# Patient Record
Sex: FEMALE | Race: WHITE | NOT HISPANIC OR LATINO | Employment: OTHER | URBAN - METROPOLITAN AREA
[De-identification: names, ages, dates, MRNs, and addresses within clinical notes are randomized per-mention and may not be internally consistent; named-entity substitution may affect disease eponyms.]

---

## 2020-07-28 ENCOUNTER — RECORDS - HEALTHEAST (OUTPATIENT)
Dept: LAB | Facility: CLINIC | Age: 85
End: 2020-07-28

## 2020-07-29 LAB
ALBUMIN SERPL-MCNC: 3.6 G/DL (ref 3.5–5)
ALP SERPL-CCNC: 70 U/L (ref 45–120)
ALT SERPL W P-5'-P-CCNC: 23 U/L (ref 0–45)
ANION GAP SERPL CALCULATED.3IONS-SCNC: 14 MMOL/L (ref 5–18)
AST SERPL W P-5'-P-CCNC: 31 U/L (ref 0–40)
BILIRUB SERPL-MCNC: 0.4 MG/DL (ref 0–1)
BUN SERPL-MCNC: 21 MG/DL (ref 8–28)
CALCIUM SERPL-MCNC: 9.4 MG/DL (ref 8.5–10.5)
CHLORIDE BLD-SCNC: 104 MMOL/L (ref 98–107)
CO2 SERPL-SCNC: 23 MMOL/L (ref 22–31)
CREAT SERPL-MCNC: 0.81 MG/DL (ref 0.6–1.1)
ERYTHROCYTE [DISTWIDTH] IN BLOOD BY AUTOMATED COUNT: 14.1 % (ref 11–14.5)
GFR SERPL CREATININE-BSD FRML MDRD: >60 ML/MIN/1.73M2
GLUCOSE BLD-MCNC: 111 MG/DL (ref 70–125)
HBA1C MFR BLD: 7.2 %
HCT VFR BLD AUTO: 46.4 % (ref 35–47)
HGB BLD-MCNC: 14.2 G/DL (ref 12–16)
MCH RBC QN AUTO: 27.8 PG (ref 27–34)
MCHC RBC AUTO-ENTMCNC: 30.6 G/DL (ref 32–36)
MCV RBC AUTO: 91 FL (ref 80–100)
PLATELET # BLD AUTO: 268 THOU/UL (ref 140–440)
PMV BLD AUTO: 10.7 FL (ref 8.5–12.5)
POTASSIUM BLD-SCNC: 4.2 MMOL/L (ref 3.5–5)
PROT SERPL-MCNC: 7.3 G/DL (ref 6–8)
RBC # BLD AUTO: 5.1 MILL/UL (ref 3.8–5.4)
SODIUM SERPL-SCNC: 141 MMOL/L (ref 136–145)
TSH SERPL DL<=0.005 MIU/L-ACNC: 1.65 UIU/ML (ref 0.3–5)
VIT B12 SERPL-MCNC: 1039 PG/ML (ref 213–816)
WBC: 11.5 THOU/UL (ref 4–11)

## 2020-08-10 ENCOUNTER — RECORDS - HEALTHEAST (OUTPATIENT)
Dept: LAB | Facility: CLINIC | Age: 85
End: 2020-08-10

## 2020-08-10 LAB
ALBUMIN UR-MCNC: NEGATIVE MG/DL
AMPHETAMINES UR QL SCN: NORMAL
APPEARANCE UR: CLEAR
BACTERIA #/AREA URNS HPF: ABNORMAL HPF
BARBITURATES UR QL: NORMAL
BENZODIAZ UR QL: NORMAL
BILIRUB UR QL STRIP: NEGATIVE
CANNABINOIDS UR QL SCN: NORMAL
COCAINE UR QL: NORMAL
COLOR UR AUTO: COLORLESS
CREAT UR-MCNC: 13.4 MG/DL
GLUCOSE UR STRIP-MCNC: ABNORMAL MG/DL
HGB UR QL STRIP: NEGATIVE
KETONES UR STRIP-MCNC: NEGATIVE MG/DL
LEUKOCYTE ESTERASE UR QL STRIP: NEGATIVE
METHADONE UR QL SCN: NORMAL
NITRATE UR QL: NEGATIVE
OPIATES UR QL SCN: NORMAL
OXYCODONE UR QL: NORMAL
PCP UR QL SCN: NORMAL
PH UR STRIP: 5 [PH] (ref 4.5–8)
RBC #/AREA URNS AUTO: ABNORMAL HPF
SP GR UR STRIP: 1 (ref 1–1.03)
SQUAMOUS #/AREA URNS AUTO: ABNORMAL LPF
UROBILINOGEN UR STRIP-ACNC: ABNORMAL
WBC #/AREA URNS AUTO: ABNORMAL HPF

## 2020-08-11 ENCOUNTER — RECORDS - HEALTHEAST (OUTPATIENT)
Dept: LAB | Facility: CLINIC | Age: 85
End: 2020-08-11

## 2020-08-11 LAB — BACTERIA SPEC CULT: NORMAL

## 2020-08-12 LAB
ANION GAP SERPL CALCULATED.3IONS-SCNC: 12 MMOL/L (ref 5–18)
BUN SERPL-MCNC: 16 MG/DL (ref 8–28)
CALCIUM SERPL-MCNC: 9.1 MG/DL (ref 8.5–10.5)
CHLORIDE BLD-SCNC: 105 MMOL/L (ref 98–107)
CO2 SERPL-SCNC: 20 MMOL/L (ref 22–31)
CREAT SERPL-MCNC: 0.84 MG/DL (ref 0.6–1.1)
GFR SERPL CREATININE-BSD FRML MDRD: >60 ML/MIN/1.73M2
GLUCOSE BLD-MCNC: 98 MG/DL (ref 70–125)
POTASSIUM BLD-SCNC: 4.2 MMOL/L (ref 3.5–5)
SODIUM SERPL-SCNC: 137 MMOL/L (ref 136–145)

## 2020-08-24 ENCOUNTER — RECORDS - HEALTHEAST (OUTPATIENT)
Dept: LAB | Facility: CLINIC | Age: 85
End: 2020-08-24

## 2020-08-24 LAB
C DIFF TOX B STL QL: NEGATIVE
RIBOTYPE 027/NAP1/BI: NORMAL

## 2021-03-04 ENCOUNTER — APPOINTMENT (OUTPATIENT)
Dept: URBAN - METROPOLITAN AREA CLINIC 252 | Age: 86
Setting detail: DERMATOLOGY
End: 2021-03-04

## 2021-03-04 ENCOUNTER — RX ONLY (RX ONLY)
Age: 86
End: 2021-03-04

## 2021-03-04 VITALS — WEIGHT: 146 LBS | RESPIRATION RATE: 16 BRPM | HEIGHT: 60 IN

## 2021-03-04 DIAGNOSIS — L21.8 OTHER SEBORRHEIC DERMATITIS: ICD-10-CM

## 2021-03-04 DIAGNOSIS — L28.1 PRURIGO NODULARIS: ICD-10-CM

## 2021-03-04 DIAGNOSIS — L29.8 OTHER PRURITUS: ICD-10-CM

## 2021-03-04 PROCEDURE — 99203 OFFICE O/P NEW LOW 30 MIN: CPT | Mod: 25

## 2021-03-04 PROCEDURE — 11900 INJECT SKIN LESIONS </W 7: CPT

## 2021-03-04 PROCEDURE — OTHER INTRALESIONAL KENALOG: OTHER

## 2021-03-04 PROCEDURE — OTHER COUNSELING: OTHER

## 2021-03-04 PROCEDURE — OTHER PRESCRIPTION: OTHER

## 2021-03-04 PROCEDURE — OTHER ADDITIONAL NOTES: OTHER

## 2021-03-04 RX ORDER — CLOBETASOL PROPIONATE 0.5 MG/G
.05% CREAM TOPICAL BID
Qty: 1 | Refills: 1 | Status: CANCELLED | COMMUNITY
Start: 2021-03-04

## 2021-03-04 RX ORDER — KETOCONAZOLE 20 MG/ML
2% SHAMPOO, SUSPENSION TOPICAL
Qty: 1 | Refills: 3 | Status: ERX | COMMUNITY
Start: 2021-03-04

## 2021-03-04 RX ORDER — CLOBETASOL PROPIONATE 0.5 MG/G
.05% CREAM TOPICAL TWICE A DAY
Qty: 1 | Refills: 0 | Status: ERX

## 2021-03-04 ASSESSMENT — LOCATION SIMPLE DESCRIPTION DERM
LOCATION SIMPLE: LEFT UPPER BACK
LOCATION SIMPLE: LEFT SHOULDER
LOCATION SIMPLE: RIGHT FOREARM
LOCATION SIMPLE: SCALP
LOCATION SIMPLE: RIGHT PRETIBIAL REGION
LOCATION SIMPLE: LOWER BACK
LOCATION SIMPLE: LEFT FOREARM
LOCATION SIMPLE: UPPER BACK
LOCATION SIMPLE: LEFT THIGH
LOCATION SIMPLE: RIGHT THIGH
LOCATION SIMPLE: RIGHT UPPER BACK
LOCATION SIMPLE: POSTERIOR NECK

## 2021-03-04 ASSESSMENT — LOCATION DETAILED DESCRIPTION DERM
LOCATION DETAILED: LEFT SUPERIOR UPPER BACK
LOCATION DETAILED: LEFT PROXIMAL DORSAL FOREARM
LOCATION DETAILED: RIGHT DISTAL PRETIBIAL REGION
LOCATION DETAILED: LEFT POSTERIOR SHOULDER
LOCATION DETAILED: RIGHT ANTERIOR PROXIMAL THIGH
LOCATION DETAILED: RIGHT PROXIMAL DORSAL FOREARM
LOCATION DETAILED: LEFT ANTERIOR PROXIMAL THIGH
LOCATION DETAILED: RIGHT SUPERIOR UPPER BACK
LOCATION DETAILED: LEFT SUPERIOR PARIETAL SCALP
LOCATION DETAILED: SUPERIOR THORACIC SPINE
LOCATION DETAILED: RIGHT MEDIAL TRAPEZIAL NECK
LOCATION DETAILED: SUPERIOR LUMBAR SPINE
LOCATION DETAILED: LEFT MEDIAL TRAPEZIAL NECK

## 2021-03-04 ASSESSMENT — LOCATION ZONE DERM
LOCATION ZONE: NECK
LOCATION ZONE: LEG
LOCATION ZONE: TRUNK
LOCATION ZONE: SCALP
LOCATION ZONE: ARM

## 2021-03-04 NOTE — PROCEDURE: ADDITIONAL NOTES
Additional Notes: [facility] fax - [phone]\\nShower twice a week, wash hair once a week with ketoconazole on back of scalp and selsun blue on rest of scalp. ****Use loofah and apply Eucerin anti itch lotion after. Additional Notes: [facility] fax - [phone]\\nShower twice a week, wash hair once a week with ketoconazole on back of scalp and selsun blue on rest of scalp. ****Use loofah and apply Eucerin anti itch lotion after.

## 2021-03-09 ENCOUNTER — RECORDS - HEALTHEAST (OUTPATIENT)
Dept: LAB | Facility: CLINIC | Age: 86
End: 2021-03-09

## 2021-03-10 LAB
ANION GAP SERPL CALCULATED.3IONS-SCNC: 9 MMOL/L (ref 5–18)
BUN SERPL-MCNC: 24 MG/DL (ref 8–28)
CALCIUM SERPL-MCNC: 9.7 MG/DL (ref 8.5–10.5)
CHLORIDE BLD-SCNC: 103 MMOL/L (ref 98–107)
CO2 SERPL-SCNC: 27 MMOL/L (ref 22–31)
CREAT SERPL-MCNC: 0.91 MG/DL (ref 0.6–1.1)
ERYTHROCYTE [DISTWIDTH] IN BLOOD BY AUTOMATED COUNT: 14.1 % (ref 11–14.5)
GFR SERPL CREATININE-BSD FRML MDRD: 58 ML/MIN/1.73M2
GLUCOSE BLD-MCNC: 193 MG/DL (ref 70–125)
HBA1C MFR BLD: 7.8 %
HCT VFR BLD AUTO: 44.3 % (ref 35–47)
HGB BLD-MCNC: 13.8 G/DL (ref 12–16)
MCH RBC QN AUTO: 28.4 PG (ref 27–34)
MCHC RBC AUTO-ENTMCNC: 31.2 G/DL (ref 32–36)
MCV RBC AUTO: 91 FL (ref 80–100)
PLATELET # BLD AUTO: 102 THOU/UL (ref 140–440)
PMV BLD AUTO: 11.9 FL (ref 8.5–12.5)
POTASSIUM BLD-SCNC: 4.7 MMOL/L (ref 3.5–5)
RBC # BLD AUTO: 4.86 MILL/UL (ref 3.8–5.4)
SODIUM SERPL-SCNC: 139 MMOL/L (ref 136–145)
TSH SERPL DL<=0.005 MIU/L-ACNC: 1.73 UIU/ML (ref 0.3–5)
WBC: 8.7 THOU/UL (ref 4–11)

## 2021-03-20 ENCOUNTER — RECORDS - HEALTHEAST (OUTPATIENT)
Dept: LAB | Facility: CLINIC | Age: 86
End: 2021-03-20

## 2021-03-20 LAB
SARS-COV-2 PCR COMMENT: NORMAL
SARS-COV-2 RNA SPEC QL NAA+PROBE: NEGATIVE
SARS-COV-2 VIRUS SPECIMEN SOURCE: NORMAL

## 2021-04-07 ENCOUNTER — APPOINTMENT (OUTPATIENT)
Dept: URBAN - METROPOLITAN AREA CLINIC 252 | Age: 86
Setting detail: DERMATOLOGY
End: 2021-04-11

## 2021-04-07 VITALS — WEIGHT: 144 LBS | HEIGHT: 60 IN | RESPIRATION RATE: 16 BRPM

## 2021-04-07 DIAGNOSIS — L28.1 PRURIGO NODULARIS: ICD-10-CM

## 2021-04-07 DIAGNOSIS — L29.8 OTHER PRURITUS: ICD-10-CM

## 2021-04-07 DIAGNOSIS — L08.9 LOCAL INFECTION OF THE SKIN AND SUBCUTANEOUS TISSUE, UNSPECIFIED: ICD-10-CM

## 2021-04-07 PROCEDURE — 99213 OFFICE O/P EST LOW 20 MIN: CPT

## 2021-04-07 PROCEDURE — OTHER ADDITIONAL NOTES: OTHER

## 2021-04-07 PROCEDURE — OTHER PRESCRIPTION: OTHER

## 2021-04-07 PROCEDURE — OTHER COUNSELING: OTHER

## 2021-04-07 RX ORDER — CEPHALEXIN 500 MG/1
500MG TABLET ORAL BID
Qty: 20 | Refills: 0 | Status: ERX | COMMUNITY
Start: 2021-04-07

## 2021-04-07 RX ORDER — MUPIROCIN 20 MG/G
2% OINTMENT TOPICAL BID
Qty: 2 | Refills: 1 | Status: ERX | COMMUNITY
Start: 2021-04-07

## 2021-04-07 RX ORDER — PETROLATUM,WHITE 41 %
0.1% OINTMENT (GRAM) TOPICAL QD
Qty: 1 | Refills: 5 | Status: ERX | COMMUNITY
Start: 2021-04-07

## 2021-04-07 ASSESSMENT — LOCATION DETAILED DESCRIPTION DERM
LOCATION DETAILED: LEFT ANTERIOR PROXIMAL THIGH
LOCATION DETAILED: LEFT PROXIMAL DORSAL FOREARM
LOCATION DETAILED: SUPERIOR LUMBAR SPINE
LOCATION DETAILED: RIGHT PROXIMAL DORSAL FOREARM
LOCATION DETAILED: RIGHT PROXIMAL PRETIBIAL REGION
LOCATION DETAILED: LEFT MEDIAL TRAPEZIAL NECK
LOCATION DETAILED: RIGHT POSTERIOR SHOULDER
LOCATION DETAILED: RIGHT MEDIAL TRAPEZIAL NECK
LOCATION DETAILED: RIGHT ANTERIOR PROXIMAL THIGH

## 2021-04-07 ASSESSMENT — LOCATION SIMPLE DESCRIPTION DERM
LOCATION SIMPLE: LEFT THIGH
LOCATION SIMPLE: RIGHT THIGH
LOCATION SIMPLE: RIGHT FOREARM
LOCATION SIMPLE: RIGHT PRETIBIAL REGION
LOCATION SIMPLE: POSTERIOR NECK
LOCATION SIMPLE: LOWER BACK
LOCATION SIMPLE: LEFT FOREARM
LOCATION SIMPLE: RIGHT SHOULDER

## 2021-04-07 ASSESSMENT — LOCATION ZONE DERM
LOCATION ZONE: ARM
LOCATION ZONE: LEG
LOCATION ZONE: NECK
LOCATION ZONE: TRUNK

## 2021-04-07 NOTE — PROCEDURE: ADDITIONAL NOTES
Additional Notes: [facility] fax - [phone]\\nShower twice a week, wash hair once a week with ketoconazole on back of scalp and selsun blue on rest of scalp. ****apply Eucerin anti itch lotion Additional Notes: [facility] fax - [phone]\\nShower twice a week, wash hair once a week with ketoconazole on back of scalp and selsun blue on rest of scalp. ****apply Eucerin anti itch lotion

## 2021-04-19 ENCOUNTER — RECORDS - HEALTHEAST (OUTPATIENT)
Dept: LAB | Facility: CLINIC | Age: 86
End: 2021-04-19

## 2021-04-20 LAB — BACTERIA SPEC CULT: NO GROWTH

## 2021-07-07 ENCOUNTER — APPOINTMENT (OUTPATIENT)
Dept: URBAN - METROPOLITAN AREA CLINIC 252 | Age: 86
Setting detail: DERMATOLOGY
End: 2021-07-11

## 2021-07-07 ENCOUNTER — RX ONLY (RX ONLY)
Age: 86
End: 2021-07-07

## 2021-07-07 VITALS — HEIGHT: 60 IN | WEIGHT: 144 LBS | RESPIRATION RATE: 18 BRPM

## 2021-07-07 DIAGNOSIS — L85.3 XEROSIS CUTIS: ICD-10-CM

## 2021-07-07 DIAGNOSIS — L97 NON-PRESSURE CHRONIC ULCER OF LOWER LIMB, NOT ELSEWHERE CLASSIFIED: ICD-10-CM

## 2021-07-07 DIAGNOSIS — I87.2 VENOUS INSUFFICIENCY (CHRONIC) (PERIPHERAL): ICD-10-CM

## 2021-07-07 PROBLEM — L97.819 NON-PRESSURE CHRONIC ULCER OF OTHER PART OF RIGHT LOWER LEG WITH UNSPECIFIED SEVERITY: Status: ACTIVE | Noted: 2021-07-07

## 2021-07-07 PROCEDURE — 99213 OFFICE O/P EST LOW 20 MIN: CPT

## 2021-07-07 PROCEDURE — OTHER ADDITIONAL NOTES: OTHER

## 2021-07-07 PROCEDURE — OTHER PRESCRIPTION MEDICATION MANAGEMENT: OTHER

## 2021-07-07 PROCEDURE — OTHER COUNSELING: OTHER

## 2021-07-07 PROCEDURE — OTHER PRESCRIPTION: OTHER

## 2021-07-07 RX ORDER — TRIAMCINOLONE ACETONIDE 1 MG/G
0.1% CREAM TOPICAL BID
Qty: 1 | Refills: 1 | Status: ERX

## 2021-07-07 RX ORDER — TRIAMCINOLONE ACETONIDE 1 MG/G
0.1% CREAM TOPICAL BID
Qty: 1 | Refills: 1 | Status: CANCELLED | COMMUNITY
Start: 2021-07-07

## 2021-07-07 RX ORDER — MUPIROCIN 20 MG/G
2% OINTMENT TOPICAL BID
Qty: 1 | Refills: 1 | Status: CANCELLED | COMMUNITY
Start: 2021-07-07

## 2021-07-07 RX ORDER — MUPIROCIN 20 MG/G
2% OINTMENT TOPICAL BID
Qty: 1 | Refills: 1 | Status: ERX

## 2021-07-07 ASSESSMENT — LOCATION DETAILED DESCRIPTION DERM
LOCATION DETAILED: RIGHT DISTAL PRETIBIAL REGION
LOCATION DETAILED: LEFT DISTAL PRETIBIAL REGION
LOCATION DETAILED: RIGHT PROXIMAL PRETIBIAL REGION

## 2021-07-07 ASSESSMENT — LOCATION SIMPLE DESCRIPTION DERM
LOCATION SIMPLE: RIGHT PRETIBIAL REGION
LOCATION SIMPLE: LEFT PRETIBIAL REGION

## 2021-07-07 ASSESSMENT — LOCATION ZONE DERM: LOCATION ZONE: LEG

## 2021-07-07 NOTE — HPI: RASH
What Type Of Note Output Would You Prefer (Optional)?: Bullet Format
Is The Patient Presenting As Previously Scheduled?: No, they are coming in before their scheduled appointment
How Severe Is Your Rash?: moderate
Is This A New Presentation, Or A Follow-Up?: Rash
Additional History: Accompanied by daughter with hx of cancer,patient is moving to new residence\\nPatients daughter has been cleaning area and applying emollients

## 2021-07-07 NOTE — PROCEDURE: COUNSELING
Patient Specific Counseling (Will Not Stick From Patient To Patient): Initiate mupirocin 2% BID x 7 days and cover with bandage, then D/C. After the mupirocin course, initiate aquaphor (or Vaseline if available) QD and cover with bandage until wounds are healed.
Detail Level: Detailed
Detail Level: Zone

## 2021-07-07 NOTE — PROCEDURE: PRESCRIPTION MEDICATION MANAGEMENT
Detail Level: Zone
Render In Strict Bullet Format?: No
Initiate Treatment: Triamcinolone 0.1% twice daily x 2 weeks, then D/C and implement PRN BID for flares of itching \\n\\nEucerin cream or OTC moisturizer of choice to the trunk and dry, itchy skin QD scheduled and PRN BID

## 2021-07-07 NOTE — PROCEDURE: ADDITIONAL NOTES
Additional Notes: **** Gave 4 printed patient hand outs for patient to give to her Assisted Living facility to implement orders as well as copies for patient daughters and Son per request.  \\n\\n***Per daughters request, She called after the appt and WANTS THE RXs TO GO TO [facility] instead of the local [facility] Additional Notes: **** Gave 4 printed patient hand outs for patient to give to her Assisted Living facility to implement orders as well as copies for patient daughters and Son per request.  \\n\\n***Per daughters request, She called after the appt and WANTS THE RXs TO GO TO [facility] instead of the local [facility]

## 2021-08-14 ENCOUNTER — LAB REQUISITION (OUTPATIENT)
Dept: LAB | Facility: CLINIC | Age: 86
End: 2021-08-14
Payer: COMMERCIAL

## 2021-08-14 DIAGNOSIS — E55.9 VITAMIN D DEFICIENCY, UNSPECIFIED: ICD-10-CM

## 2021-08-14 DIAGNOSIS — E11.42 TYPE 2 DIABETES MELLITUS WITH DIABETIC POLYNEUROPATHY (H): ICD-10-CM

## 2021-08-16 LAB
ALBUMIN SERPL-MCNC: 3.4 G/DL (ref 3.5–5)
ALP SERPL-CCNC: 51 U/L (ref 45–120)
ALT SERPL W P-5'-P-CCNC: 20 U/L (ref 0–45)
ANION GAP SERPL CALCULATED.3IONS-SCNC: 11 MMOL/L (ref 5–18)
AST SERPL W P-5'-P-CCNC: 27 U/L (ref 0–40)
BASOPHILS # BLD AUTO: 0.1 10E3/UL (ref 0–0.2)
BASOPHILS NFR BLD AUTO: 1 %
BILIRUB SERPL-MCNC: 0.3 MG/DL (ref 0–1)
BUN SERPL-MCNC: 23 MG/DL (ref 8–28)
CALCIUM SERPL-MCNC: 9.5 MG/DL (ref 8.5–10.5)
CHLORIDE BLD-SCNC: 106 MMOL/L (ref 98–107)
CO2 SERPL-SCNC: 25 MMOL/L (ref 22–31)
CREAT SERPL-MCNC: 0.82 MG/DL (ref 0.6–1.1)
DEPRECATED CALCIDIOL+CALCIFEROL SERPL-MC: 51 UG/L (ref 30–80)
EOSINOPHIL # BLD AUTO: 0.2 10E3/UL (ref 0–0.7)
EOSINOPHIL NFR BLD AUTO: 3 %
ERYTHROCYTE [DISTWIDTH] IN BLOOD BY AUTOMATED COUNT: 14.9 % (ref 10–15)
GFR SERPL CREATININE-BSD FRML MDRD: 63 ML/MIN/1.73M2
GLUCOSE BLD-MCNC: 75 MG/DL (ref 70–125)
HBA1C MFR BLD: 7.3 %
HCT VFR BLD AUTO: 43.1 % (ref 35–47)
HGB BLD-MCNC: 13.3 G/DL (ref 11.7–15.7)
IMM GRANULOCYTES # BLD: 0 10E3/UL
IMM GRANULOCYTES NFR BLD: 0 %
LYMPHOCYTES # BLD AUTO: 1.6 10E3/UL (ref 0.8–5.3)
LYMPHOCYTES NFR BLD AUTO: 28 %
MCH RBC QN AUTO: 27.1 PG (ref 26.5–33)
MCHC RBC AUTO-ENTMCNC: 30.9 G/DL (ref 31.5–36.5)
MCV RBC AUTO: 88 FL (ref 78–100)
MONOCYTES # BLD AUTO: 0.5 10E3/UL (ref 0–1.3)
MONOCYTES NFR BLD AUTO: 9 %
NEUTROPHILS # BLD AUTO: 3.4 10E3/UL (ref 1.6–8.3)
NEUTROPHILS NFR BLD AUTO: 59 %
NRBC # BLD AUTO: 0 10E3/UL
NRBC BLD AUTO-RTO: 0 /100
PLATELET # BLD AUTO: 134 10E3/UL (ref 150–450)
POTASSIUM BLD-SCNC: 4.3 MMOL/L (ref 3.5–5)
PROT SERPL-MCNC: 6.5 G/DL (ref 6–8)
RBC # BLD AUTO: 4.91 10E6/UL (ref 3.8–5.2)
SODIUM SERPL-SCNC: 142 MMOL/L (ref 136–145)
WBC # BLD AUTO: 5.7 10E3/UL (ref 4–11)

## 2021-08-16 PROCEDURE — 85025 COMPLETE CBC W/AUTO DIFF WBC: CPT | Mod: ORL | Performed by: FAMILY MEDICINE

## 2021-08-16 PROCEDURE — 80053 COMPREHEN METABOLIC PANEL: CPT | Mod: ORL | Performed by: FAMILY MEDICINE

## 2021-08-16 PROCEDURE — 83036 HEMOGLOBIN GLYCOSYLATED A1C: CPT | Mod: ORL | Performed by: FAMILY MEDICINE

## 2021-08-16 PROCEDURE — 82306 VITAMIN D 25 HYDROXY: CPT | Mod: ORL | Performed by: FAMILY MEDICINE

## 2021-08-16 PROCEDURE — P9603 ONE-WAY ALLOW PRORATED MILES: HCPCS | Mod: ORL | Performed by: FAMILY MEDICINE

## 2021-08-16 PROCEDURE — 36415 COLL VENOUS BLD VENIPUNCTURE: CPT | Mod: ORL | Performed by: FAMILY MEDICINE

## 2021-09-08 ENCOUNTER — LAB REQUISITION (OUTPATIENT)
Dept: LAB | Facility: CLINIC | Age: 86
End: 2021-09-08
Payer: COMMERCIAL

## 2021-09-08 DIAGNOSIS — Z03.818 ENCOUNTER FOR OBSERVATION FOR SUSPECTED EXPOSURE TO OTHER BIOLOGICAL AGENTS RULED OUT: ICD-10-CM

## 2021-09-09 PROCEDURE — U0005 INFEC AGEN DETEC AMPLI PROBE: HCPCS | Mod: ORL | Performed by: FAMILY MEDICINE

## 2021-09-10 ENCOUNTER — TELEPHONE (OUTPATIENT)
Dept: WOUND CARE | Facility: CLINIC | Age: 86
End: 2021-09-10

## 2021-09-10 ENCOUNTER — OFFICE VISIT (OUTPATIENT)
Dept: FAMILY MEDICINE | Facility: CLINIC | Age: 86
End: 2021-09-10

## 2021-09-10 VITALS
HEIGHT: 59 IN | BODY MASS INDEX: 29.19 KG/M2 | WEIGHT: 144.8 LBS | SYSTOLIC BLOOD PRESSURE: 124 MMHG | TEMPERATURE: 97.3 F | OXYGEN SATURATION: 94 % | DIASTOLIC BLOOD PRESSURE: 74 MMHG | HEART RATE: 78 BPM

## 2021-09-10 DIAGNOSIS — M25.571 PAIN IN JOINT INVOLVING ANKLE AND FOOT, RIGHT: ICD-10-CM

## 2021-09-10 DIAGNOSIS — M70.31 BURSITIS OF OTHER BURSA OF RIGHT ELBOW: ICD-10-CM

## 2021-09-10 DIAGNOSIS — M25.521 RIGHT ELBOW PAIN: ICD-10-CM

## 2021-09-10 DIAGNOSIS — L97.912 ULCER OF RIGHT LOWER EXTREMITY WITH FAT LAYER EXPOSED (H): Primary | ICD-10-CM

## 2021-09-10 DIAGNOSIS — L97.812 NON-PRESSURE CHRONIC ULCER OF OTHER PART OF RIGHT LOWER LEG WITH FAT LAYER EXPOSED (H): ICD-10-CM

## 2021-09-10 DIAGNOSIS — I83.213 VARICOSE VEINS OF RIGHT LOWER EXTREMITY WITH BOTH ULCER OF ANKLE AND INFLAMMATION (CODE) (H): ICD-10-CM

## 2021-09-10 DIAGNOSIS — S81.801D OPEN WOUND OF RIGHT LOWER EXTREMITY, SUBSEQUENT ENCOUNTER: Primary | ICD-10-CM

## 2021-09-10 PROBLEM — E78.5 HYPERLIPIDEMIA: Status: ACTIVE | Noted: 2021-09-10

## 2021-09-10 PROBLEM — H90.3 SENSORINEURAL HEARING LOSS (SNHL), BILATERAL: Status: ACTIVE | Noted: 2017-08-01

## 2021-09-10 PROBLEM — E53.1 VITAMIN B6 DEFICIENCY: Status: ACTIVE | Noted: 2020-10-27

## 2021-09-10 PROBLEM — E11.42 DIABETIC PERIPHERAL NEUROPATHY (H): Status: ACTIVE | Noted: 2019-03-13

## 2021-09-10 PROBLEM — Z76.89 HEALTH CARE HOME: Status: ACTIVE | Noted: 2021-09-10

## 2021-09-10 PROBLEM — D50.9 IRON DEFICIENCY ANEMIA: Status: ACTIVE | Noted: 2018-08-27

## 2021-09-10 PROBLEM — I73.9 PVD (PERIPHERAL VASCULAR DISEASE) (H): Status: ACTIVE | Noted: 2019-03-13

## 2021-09-10 PROBLEM — G47.33 OBSTRUCTIVE SLEEP APNEA (ADULT) (PEDIATRIC): Status: ACTIVE | Noted: 2019-04-25

## 2021-09-10 PROBLEM — E55.9 VITAMIN D DEFICIENCY: Status: ACTIVE | Noted: 2018-07-03

## 2021-09-10 PROBLEM — M19.90 OSTEOARTHRITIS: Status: ACTIVE | Noted: 2021-09-10

## 2021-09-10 PROBLEM — M81.0 AGE-RELATED OSTEOPOROSIS WITHOUT CURRENT PATHOLOGICAL FRACTURE: Status: ACTIVE | Noted: 2020-02-12

## 2021-09-10 PROBLEM — R91.1 LUNG NODULE: Status: ACTIVE | Noted: 2019-03-07

## 2021-09-10 PROBLEM — J98.4 OTHER DISEASES OF LUNG, NOT ELSEWHERE CLASSIFIED: Status: ACTIVE | Noted: 2021-09-10

## 2021-09-10 LAB — SARS-COV-2 RNA RESP QL NAA+PROBE: NEGATIVE

## 2021-09-10 PROCEDURE — 99204 OFFICE O/P NEW MOD 45 MIN: CPT | Performed by: FAMILY MEDICINE

## 2021-09-10 PROCEDURE — 73610 X-RAY EXAM OF ANKLE: CPT | Mod: RT | Performed by: FAMILY MEDICINE

## 2021-09-10 PROCEDURE — 73070 X-RAY EXAM OF ELBOW: CPT | Mod: RT | Performed by: FAMILY MEDICINE

## 2021-09-10 RX ORDER — DAPAGLIFLOZIN AND SAXAGLIPTIN 10; 5 MG/1; MG/1
1 TABLET, FILM COATED ORAL
COMMUNITY
Start: 2021-04-12 | End: 2021-09-10

## 2021-09-10 RX ORDER — MULTIVITAMIN WITH IRON
TABLET ORAL
COMMUNITY
Start: 2021-07-28 | End: 2022-07-24

## 2021-09-10 RX ORDER — CALCIUM CARBONATE 500(1250)
TABLET,CHEWABLE ORAL
COMMUNITY
Start: 2020-02-13 | End: 2021-09-10

## 2021-09-10 RX ORDER — BISMUTH SUBSALICYLATE 525 MG/15ML
SUSPENSION ORAL
COMMUNITY
Start: 2021-07-24 | End: 2021-09-10

## 2021-09-10 RX ORDER — METFORMIN HCL 500 MG
TABLET, EXTENDED RELEASE 24 HR ORAL
COMMUNITY
Start: 2021-08-17 | End: 2021-09-10

## 2021-09-10 RX ORDER — PSYLLIUM HUSK (WITH SUGAR) 3.4 G/12 G
POWDER (GRAM) ORAL
COMMUNITY
Start: 2021-05-03 | End: 2021-09-10

## 2021-09-10 RX ORDER — CLINDAMYCIN HCL 150 MG
CAPSULE ORAL
COMMUNITY
End: 2021-09-10

## 2021-09-10 RX ORDER — CARBOXYMETHYLCELLULOSE SODIUM 5 MG/ML
SOLUTION/ DROPS OPHTHALMIC
COMMUNITY
Start: 2021-07-28 | End: 2021-09-10

## 2021-09-10 RX ORDER — ERYTHROMYCIN 5 MG/G
OINTMENT OPHTHALMIC
COMMUNITY
Start: 2021-04-13 | End: 2021-09-10

## 2021-09-10 RX ORDER — CLOBETASOL PROPIONATE 0.5 MG/G
CREAM TOPICAL
COMMUNITY
Start: 2021-03-04 | End: 2021-09-10

## 2021-09-10 RX ORDER — PEN NEEDLE, DIABETIC, SAFETY 30 GX3/16"
NEEDLE, DISPOSABLE MISCELLANEOUS
COMMUNITY
Start: 2020-05-07

## 2021-09-10 RX ORDER — LEVOFLOXACIN 500 MG/1
TABLET, FILM COATED ORAL
COMMUNITY
End: 2021-09-10

## 2021-09-10 RX ORDER — INSULIN ASPART 100 [IU]/ML
INJECTION, SOLUTION INTRAVENOUS; SUBCUTANEOUS
COMMUNITY
End: 2021-09-10

## 2021-09-10 RX ORDER — CHOLESTYRAMINE 4 G/9G
1 POWDER, FOR SUSPENSION ORAL 3 TIMES DAILY PRN
COMMUNITY
Start: 2021-07-27 | End: 2023-01-01

## 2021-09-10 RX ORDER — CALCIUM CARBONATE 500 MG/1
2 TABLET, CHEWABLE ORAL 2 TIMES DAILY PRN
COMMUNITY
Start: 2021-07-27 | End: 2023-01-01

## 2021-09-10 RX ORDER — ACETAMINOPHEN 500 MG
500 TABLET ORAL 4 TIMES DAILY PRN
COMMUNITY
Start: 2021-09-10

## 2021-09-10 RX ORDER — BETAMETHASONE DIPROPIONATE 0.5 MG/G
OINTMENT, AUGMENTED TOPICAL
COMMUNITY
End: 2021-09-10

## 2021-09-10 RX ORDER — ALPRAZOLAM 1 MG/1
TABLET ORAL
COMMUNITY
Start: 2021-09-03 | End: 2021-09-10

## 2021-09-10 RX ORDER — FLASH GLUCOSE SENSOR
KIT MISCELLANEOUS
COMMUNITY
Start: 2021-05-12 | End: 2021-09-10

## 2021-09-10 RX ORDER — HYDROCODONE BITARTRATE AND ACETAMINOPHEN 5; 325 MG/1; MG/1
TABLET ORAL
COMMUNITY
Start: 2021-03-24 | End: 2021-09-10

## 2021-09-10 RX ORDER — CARBOXYMETHYLCELLULOSE SODIUM 5 MG/ML
1 SOLUTION/ DROPS OPHTHALMIC EVERY 4 HOURS PRN
COMMUNITY
Start: 2021-09-10

## 2021-09-10 RX ORDER — MULTIVIT-MIN/IRON/FOLIC ACID/K 18-600-40
2000 CAPSULE ORAL DAILY
COMMUNITY
Start: 2021-09-10 | End: 2023-01-01

## 2021-09-10 RX ORDER — CARBOXYMETHYLCELLULOSE SODIUM 5 MG/ML
SOLUTION/ DROPS OPHTHALMIC
COMMUNITY
Start: 2021-06-26 | End: 2021-09-10

## 2021-09-10 RX ORDER — DAPAGLIFLOZIN AND SAXAGLIPTIN 10; 5 MG/1; MG/1
TABLET, FILM COATED ORAL
COMMUNITY
Start: 2021-09-10 | End: 2022-07-24

## 2021-09-10 RX ORDER — LANOLIN ALCOHOL/MO/W.PET/CERES
CREAM (GRAM) TOPICAL DAILY
COMMUNITY
Start: 2021-09-10 | End: 2022-07-24

## 2021-09-10 RX ORDER — FLUOCINONIDE TOPICAL SOLUTION USP, 0.05% 0.5 MG/ML
SOLUTION TOPICAL
COMMUNITY
End: 2021-09-10

## 2021-09-10 RX ORDER — BETAMETHASONE DIPROPIONATE 0.5 MG/ML
LOTION, AUGMENTED TOPICAL
COMMUNITY
End: 2021-09-10

## 2021-09-10 RX ORDER — INSULIN GLARGINE 100 [IU]/ML
12 INJECTION, SOLUTION SUBCUTANEOUS AT BEDTIME
Status: ON HOLD | COMMUNITY
Start: 2021-09-10 | End: 2022-07-28

## 2021-09-10 RX ORDER — GUAIFENESIN 600 MG/1
600 TABLET, EXTENDED RELEASE ORAL 2 TIMES DAILY PRN
COMMUNITY
Start: 2021-07-27 | End: 2023-01-01

## 2021-09-10 RX ORDER — IPRATROPIUM BROMIDE AND ALBUTEROL SULFATE 2.5; .5 MG/3ML; MG/3ML
SOLUTION RESPIRATORY (INHALATION)
COMMUNITY
Start: 2020-07-10 | End: 2022-07-24

## 2021-09-10 RX ORDER — HYDROCORTISONE AND ACETIC ACID 20.75; 10.375 MG/ML; MG/ML
SOLUTION AURICULAR (OTIC)
COMMUNITY
Start: 2021-01-05 | End: 2021-09-10

## 2021-09-10 RX ORDER — UREA 10 %
500 LOTION (ML) TOPICAL 2 TIMES DAILY
COMMUNITY
Start: 2021-09-10 | End: 2022-07-24

## 2021-09-10 RX ORDER — SODIUM CHLORIDE 0.65 %
2 AEROSOL, SPRAY (ML) NASAL
COMMUNITY
Start: 2021-07-27

## 2021-09-10 RX ORDER — BLOOD GLUCOSE CONTROL HIGH,LOW
2 EACH MISCELLANEOUS
COMMUNITY
Start: 2020-04-07 | End: 2021-09-10

## 2021-09-10 RX ORDER — METFORMIN HCL 500 MG
500 TABLET, EXTENDED RELEASE 24 HR ORAL DAILY
COMMUNITY
Start: 2021-09-10 | End: 2023-01-01

## 2021-09-10 RX ORDER — GABAPENTIN 300 MG/1
300 CAPSULE ORAL AT BEDTIME
COMMUNITY
Start: 2021-08-24 | End: 2023-01-01

## 2021-09-10 RX ORDER — KETOCONAZOLE 20 MG/ML
1 SHAMPOO TOPICAL
COMMUNITY
End: 2023-01-01

## 2021-09-10 RX ORDER — TRIAMCINOLONE ACETONIDE 1 MG/G
CREAM TOPICAL
COMMUNITY
Start: 2021-07-08 | End: 2021-09-10

## 2021-09-10 RX ORDER — FUROSEMIDE 20 MG
20 TABLET ORAL
COMMUNITY
End: 2023-01-01

## 2021-09-10 RX ORDER — GLIMEPIRIDE 4 MG/1
4 TABLET ORAL DAILY
COMMUNITY
Start: 2021-09-10 | End: 2022-07-24

## 2021-09-10 RX ORDER — MICONAZOLE NITRATE 20 MG/G
CREAM TOPICAL
COMMUNITY
Start: 2021-07-27

## 2021-09-10 RX ORDER — INSULIN DETEMIR 100 [IU]/ML
INJECTION, SOLUTION SUBCUTANEOUS
COMMUNITY
Start: 2020-09-13 | End: 2021-09-10

## 2021-09-10 RX ORDER — FLASH GLUCOSE SCANNING READER
EACH MISCELLANEOUS
COMMUNITY
Start: 2021-05-12 | End: 2021-09-10

## 2021-09-10 RX ORDER — PREDNISONE 10 MG/1
TABLET ORAL
COMMUNITY
End: 2021-09-10

## 2021-09-10 RX ORDER — BLOOD-GLUCOSE METER
EACH MISCELLANEOUS
COMMUNITY
Start: 2020-04-06 | End: 2021-09-10

## 2021-09-10 ASSESSMENT — MIFFLIN-ST. JEOR: SCORE: 982.44

## 2021-09-10 NOTE — PATIENT INSTRUCTIONS
"Assessment & Plan   Problem List Items Addressed This Visit     None      Visit Diagnoses     Open wound of right lower extremity, subsequent encounter    -  Primary    Relevant Orders    Wound Care Referral    Right elbow pain        Relevant Orders    XR Elbow Right 2 Views    Orthopedic  Referral    Bursitis of other bursa of right elbow        Relevant Orders    Orthopedic  Referral    Pain in joint involving ankle and foot, right        Relevant Orders    X-ray rt ankle G/E 3 views*    Orthopedic  Referral           1. Open wound of right lower extremity, subsequent encounter  I will send her to the wound clinic. I would like to prescribe an antibiotic but she appears to have allergies to nearly all abx. Son will call me with possible abx after he talks to his sister, who has been involved in her care for the past few years.  - Wound Care Referral; Future    2. Right elbow pain  Referral to see ortho.  - XR Elbow Right 2 Views  - Orthopedic  Referral; Future    3. Bursitis of other bursa of right elbow  Referral to see ortho. Avoid bearing weight on the elbow.  - Orthopedic  Referral; Future    4. Pain in joint involving ankle and foot, right  xrays done.  - X-ray rt ankle G/E 3 views*  - Orthopedic  Referral; Future         BMI:   Estimated body mass index is 29.25 kg/m  as calculated from the following:    Height as of this encounter: 1.499 m (4' 11\").    Weight as of this encounter: 65.7 kg (144 lb 12.8 oz).         FUTURE APPOINTMENTS:       - Follow-up visit in 2 weeks    No follow-ups on file.    Sherrie Briceno MD  [facility]  "

## 2021-09-10 NOTE — TELEPHONE ENCOUNTER
Consult received via [employer] and phone) from provider Dr. Briceno for ulcer of the leg/ankle  Per Standing order Patient qualifies for venous ultrasound and ABDELRAHMAN to be scheduled prior to assessment with Dr. Shine or Dr. Mcpherson or Piedad at [facility] [place] [facility].  Routing to  Angela Choi Or Татьяна [place].

## 2021-09-10 NOTE — PROGRESS NOTES
"Assessment & Plan   Problem List Items Addressed This Visit     None      Visit Diagnoses     Open wound of right lower extremity, subsequent encounter    -  Primary    Relevant Orders    Wound Care Referral    Right elbow pain        Relevant Orders    XR Elbow Right 2 Views    Orthopedic  Referral    Bursitis of other bursa of right elbow        Relevant Orders    Orthopedic  Referral    Pain in joint involving ankle and foot, right        Relevant Orders    X-ray rt ankle G/E 3 views*    Orthopedic  Referral           1. Open wound of right lower extremity, subsequent encounter  I will send her to the wound clinic. I would like to prescribe an antibiotic but she appears to have allergies to nearly all abx. Son will call me with possible abx after he talks to his sister, who has been involved in her care for the past few years.  - Wound Care Referral; Future    2. Right elbow pain  Referral to see ortho.  - XR Elbow Right 2 Views  - Orthopedic  Referral; Future    3. Bursitis of other bursa of right elbow  Referral to see ortho. Avoid bearing weight on the elbow.  - Orthopedic  Referral; Future    4. Pain in joint involving ankle and foot, right  xrays done.  - X-ray rt ankle G/E 3 views*  - Orthopedic  Referral; Future         BMI:   Estimated body mass index is 29.25 kg/m  as calculated from the following:    Height as of this encounter: 1.499 m (4' 11\").    Weight as of this encounter: 65.7 kg (144 lb 12.8 oz).         FUTURE APPOINTMENTS:       - Follow-up visit in 2 weeks    No follow-ups on file.    Sherrie Briceno MD  [facility]    Subjective     Nursing Notes:   Desiree Goldsmith CMA  9/10/2021 10:12 AM  Signed  Chief Complaint   Patient presents with     [facility] [place]     Pain     sore right elbow and right ankle, has been painful for the last few months     Pre-visit Screening:  Immunizations:  " "up to date  Colonoscopy:  is up to date  Mammogram: is up to date  Asthma Action Test/Plan:  NA  PHQ9:  NA  GAD7:  NA  Questioned patient about current smoking habits Pt. quit smoking recently.  Ok to leave detailed message on voice mail for today's visit only Yes, phone # [phone]           Mitali Richardson is a 89 year old female who presents to clinic today for the following health issues   HPI     Here with son.   Establish care.  Had a slight stroke.  Has a sore ankle right and sore elbow on right.    Has a wound on the right lower leg for months, is having wound care by staff at the care facility.    Has some right elbow pain and Is seeing PT. Thinks because of the walker. No recent injury. But feel and injured it previously then it didn't bother her but now over the past month has sharp pains.       Review of Systems   Constitutional, HEENT, cardiovascular, pulmonary, gi and gu systems are negative, except as otherwise noted.      Objective    /74 (BP Location: Left arm, Patient Position: Sitting, Cuff Size: Adult Regular)   Pulse 78   Temp 97.3  F (36.3  C) (Temporal)   Ht 1.499 m (4' 11\")   Wt 65.7 kg (144 lb 12.8 oz)   SpO2 94%   BMI 29.25 kg/m    Body mass index is 29.25 kg/m .  Physical Exam   GENERAL: healthy, alert and no distress  RESP: lungs clear to auscultation - no rales, rhonchi or wheezes  CV: regular rate and rhythm, normal S1 S2, no S3 or S4, no murmur, click or rub, no peripheral edema and peripheral pulses strong  ABDOMEN: soft, nontender, no hepatosplenomegaly, no masses and bowel sounds normal  MS: no gross musculoskeletal defects noted, no edema  NEURO: Normal strength and tone, mentation intact and speech normal  PSYCH: mentation appears normal, affect normal/bright  History given by patient and her son  Right ankle limited rom, tender in lower anterior leg. There is some redness and tenderness of the skin. Deep ulcer, 5 mm diameter with yellow discharge. There is a " bandage.  Right elbow normal rom, there is a boggy tender bursitis, not infected at olecranon    Results for orders placed or performed in visit on 09/10/21   XR Elbow Right 2 Views     Status: None    Narrative    [facility]  Phone: [phone] * Fax: [phone] [address], [place] [address]  [address] [facility], [id number]  [place] [address]  Age: 89 Y  Dept No.: [id number]  JHONATHAN KEMP : 1931  Chart #  Gender: F  Req. Phys: Sherrie Briceno MD Clinic MRN: [id number]  Clinic: [facility] Acc#: [id number]  Exam: ELBOW 2 VIEWS / RT Exam Date: 09/10/2021  EXAM: ELBOW 2 VIEWS RIGHT  LOCATION: [facility]  DATE/TIME: 9/10/2021 3:30 PM  INDICATION: Right elbow pain.  COMPARISON: None.  IMPRESSION:  Normal joint spaces and alignment. No fracture or joint effusion. Soft tissue prominence in the olecranon region suspicious  for olecranon bursitis.  Performed by: [facility]  Transcribed By: FELICITAS on: 09/10/2021 4:14 PM CDT  Finalized By: NINI REARDON M.D. on: 09/10/2021 5:15 PM CDT  Dictated By: NINI REARDON M.D. Signed by: [facility]  Page 1 of 1   X-ray rt ankle G/E 3 views*     Status: None    Narrative    [facility]  Phone: [phone] * Fax: [phone] [address], [place] [address]  [address] [facility], [address]  [place] [address]  Age: 89 Y  Dept No.: [id number]  JHONATHAN KEMP : 1931  Chart #  Gender: F  Req. Phys: Sherrie Briceno MD Clinic MRN: [id number]  Clinic: [facility] Acc#: [id number]  Exam: ANKLE 3 VIEWS / RT Exam Date: 09/10/2021  EXAM: ANKLE 3 VIEWS RIGHT  LOCATION: [facility]  DATE/TIME: 9/10/2021 3:33 PM  INDICATION: Right ankle pain.  COMPARISON: 2018 x-ray.  IMPRESSION:  No evidence of acute fracture. Mortise appears congruent. Mild midfoot degenerative changes. Small plantar  calcaneal  spur. Vascular calcifications. Osteopenia.  Performed by: EH  Transcribed By: FELICITAS on: 09/10/2021 4:15 PM CDT  Finalized By: NINI REARDON M.D. on: 09/10/2021 5:15 PM CDT  Dictated By: NINI REARDON M.D. Signed by: [facility]  Page 1 of 1   Culture Aerobic Bacteria ([facility])     Status: Abnormal (Preliminary result)   Result Value Ref Range    Result SEE NOTE (A)

## 2021-09-10 NOTE — NURSING NOTE
Chief Complaint   Patient presents with     Establish Care     [facility] [place]     Pain     sore right elbow and right ankle, has been painful for the last few months     Pre-visit Screening:  Immunizations:  up to date  Colonoscopy:  is up to date  Mammogram: is up to date  Asthma Action Test/Plan:  NA  PHQ9:  NA  GAD7:  NA  Questioned patient about current smoking habits Pt. quit smoking recently.  Ok to leave detailed message on voice mail for today's visit only Yes, phone # [phone]

## 2021-09-12 ENCOUNTER — HEALTH MAINTENANCE LETTER (OUTPATIENT)
Age: 86
End: 2021-09-12

## 2021-09-13 ENCOUNTER — TELEPHONE (OUTPATIENT)
Dept: WOUND CARE | Facility: CLINIC | Age: 86
End: 2021-09-13

## 2021-09-13 ENCOUNTER — TRANSFERRED RECORDS (OUTPATIENT)
Dept: FAMILY MEDICINE | Facility: CLINIC | Age: 86
End: 2021-09-13

## 2021-09-13 ENCOUNTER — LAB REQUISITION (OUTPATIENT)
Dept: LAB | Facility: CLINIC | Age: 86
End: 2021-09-13
Payer: COMMERCIAL

## 2021-09-13 DIAGNOSIS — Z03.818 ENCOUNTER FOR OBSERVATION FOR SUSPECTED EXPOSURE TO OTHER BIOLOGICAL AGENTS RULED OUT: ICD-10-CM

## 2021-09-13 DIAGNOSIS — L97.912 ULCER OF RIGHT LOWER EXTREMITY WITH FAT LAYER EXPOSED (H): ICD-10-CM

## 2021-09-13 DIAGNOSIS — E11.9 TYPE 2 DIABETES, HBA1C GOAL < 7% (H): ICD-10-CM

## 2021-09-13 LAB — RESULT - QUEST: ABNORMAL

## 2021-09-14 PROCEDURE — U0005 INFEC AGEN DETEC AMPLI PROBE: HCPCS | Mod: ORL | Performed by: FAMILY MEDICINE

## 2021-09-15 LAB — SARS-COV-2 RNA RESP QL NAA+PROBE: NEGATIVE

## 2021-09-16 NOTE — TELEPHONE ENCOUNTER
Left message for patient's son, Juan Ramon, to call back to schedule US and consult with wound clinic

## 2021-09-20 ENCOUNTER — LAB REQUISITION (OUTPATIENT)
Dept: LAB | Facility: CLINIC | Age: 86
End: 2021-09-20
Payer: COMMERCIAL

## 2021-09-20 DIAGNOSIS — Z03.818 ENCOUNTER FOR OBSERVATION FOR SUSPECTED EXPOSURE TO OTHER BIOLOGICAL AGENTS RULED OUT: ICD-10-CM

## 2021-09-22 PROCEDURE — U0003 INFECTIOUS AGENT DETECTION BY NUCLEIC ACID (DNA OR RNA); SEVERE ACUTE RESPIRATORY SYNDROME CORONAVIRUS 2 (SARS-COV-2) (CORONAVIRUS DISEASE [COVID-19]), AMPLIFIED PROBE TECHNIQUE, MAKING USE OF HIGH THROUGHPUT TECHNOLOGIES AS DESCRIBED BY CMS-2020-01-R: HCPCS | Mod: ORL | Performed by: FAMILY MEDICINE

## 2021-09-24 ENCOUNTER — HOSPITAL ENCOUNTER (OUTPATIENT)
Dept: MRI IMAGING | Facility: CLINIC | Age: 86
End: 2021-09-24
Attending: PSYCHIATRY & NEUROLOGY
Payer: COMMERCIAL

## 2021-09-24 DIAGNOSIS — G45.9 TIA (TRANSIENT ISCHEMIC ATTACK): ICD-10-CM

## 2021-09-24 DIAGNOSIS — G45.3 AMAUROSIS FUGAX: ICD-10-CM

## 2021-09-24 DIAGNOSIS — G44.309 POST-TRAUMATIC HEADACHE: ICD-10-CM

## 2021-09-24 LAB — SARS-COV-2 RNA RESP QL NAA+PROBE: NOT DETECTED

## 2021-09-24 PROCEDURE — 70547 MR ANGIOGRAPHY NECK W/O DYE: CPT

## 2021-09-24 PROCEDURE — 70551 MRI BRAIN STEM W/O DYE: CPT

## 2021-09-24 PROCEDURE — 70544 MR ANGIOGRAPHY HEAD W/O DYE: CPT | Mod: XS

## 2021-09-27 ENCOUNTER — LAB REQUISITION (OUTPATIENT)
Dept: LAB | Facility: CLINIC | Age: 86
End: 2021-09-27
Payer: COMMERCIAL

## 2021-09-27 DIAGNOSIS — Z03.818 ENCOUNTER FOR OBSERVATION FOR SUSPECTED EXPOSURE TO OTHER BIOLOGICAL AGENTS RULED OUT: ICD-10-CM

## 2021-09-29 ENCOUNTER — LAB REQUISITION (OUTPATIENT)
Dept: LAB | Facility: CLINIC | Age: 86
End: 2021-09-29
Payer: COMMERCIAL

## 2021-09-29 DIAGNOSIS — Z03.818 ENCOUNTER FOR OBSERVATION FOR SUSPECTED EXPOSURE TO OTHER BIOLOGICAL AGENTS RULED OUT: ICD-10-CM

## 2021-09-29 PROCEDURE — U0003 INFECTIOUS AGENT DETECTION BY NUCLEIC ACID (DNA OR RNA); SEVERE ACUTE RESPIRATORY SYNDROME CORONAVIRUS 2 (SARS-COV-2) (CORONAVIRUS DISEASE [COVID-19]), AMPLIFIED PROBE TECHNIQUE, MAKING USE OF HIGH THROUGHPUT TECHNOLOGIES AS DESCRIBED BY CMS-2020-01-R: HCPCS | Mod: ORL | Performed by: FAMILY MEDICINE

## 2021-10-02 LAB — SARS-COV-2 RNA RESP QL NAA+PROBE: NOT DETECTED

## 2021-10-04 ENCOUNTER — LAB REQUISITION (OUTPATIENT)
Dept: LAB | Facility: CLINIC | Age: 86
End: 2021-10-04
Payer: COMMERCIAL

## 2021-10-04 DIAGNOSIS — Z03.818 ENCOUNTER FOR OBSERVATION FOR SUSPECTED EXPOSURE TO OTHER BIOLOGICAL AGENTS RULED OUT: ICD-10-CM

## 2021-10-04 PROCEDURE — U0003 INFECTIOUS AGENT DETECTION BY NUCLEIC ACID (DNA OR RNA); SEVERE ACUTE RESPIRATORY SYNDROME CORONAVIRUS 2 (SARS-COV-2) (CORONAVIRUS DISEASE [COVID-19]), AMPLIFIED PROBE TECHNIQUE, MAKING USE OF HIGH THROUGHPUT TECHNOLOGIES AS DESCRIBED BY CMS-2020-01-R: HCPCS | Mod: ORL | Performed by: FAMILY MEDICINE

## 2021-10-06 LAB — SARS-COV-2 RNA RESP QL NAA+PROBE: NOT DETECTED

## 2021-10-11 ENCOUNTER — TRANSFERRED RECORDS (OUTPATIENT)
Dept: FAMILY MEDICINE | Facility: CLINIC | Age: 86
End: 2021-10-11

## 2021-10-11 ENCOUNTER — LAB REQUISITION (OUTPATIENT)
Dept: LAB | Facility: CLINIC | Age: 86
End: 2021-10-11
Payer: COMMERCIAL

## 2021-10-11 DIAGNOSIS — Z03.818 ENCOUNTER FOR OBSERVATION FOR SUSPECTED EXPOSURE TO OTHER BIOLOGICAL AGENTS RULED OUT: ICD-10-CM

## 2021-10-11 PROCEDURE — U0003 INFECTIOUS AGENT DETECTION BY NUCLEIC ACID (DNA OR RNA); SEVERE ACUTE RESPIRATORY SYNDROME CORONAVIRUS 2 (SARS-COV-2) (CORONAVIRUS DISEASE [COVID-19]), AMPLIFIED PROBE TECHNIQUE, MAKING USE OF HIGH THROUGHPUT TECHNOLOGIES AS DESCRIBED BY CMS-2020-01-R: HCPCS | Mod: ORL | Performed by: FAMILY MEDICINE

## 2021-10-13 LAB — SARS-COV-2 RNA RESP QL NAA+PROBE: NOT DETECTED

## 2021-10-14 ENCOUNTER — APPOINTMENT (OUTPATIENT)
Dept: GENERAL RADIOLOGY | Facility: CLINIC | Age: 86
End: 2021-10-14
Attending: EMERGENCY MEDICINE
Payer: COMMERCIAL

## 2021-10-14 ENCOUNTER — HOSPITAL ENCOUNTER (EMERGENCY)
Facility: CLINIC | Age: 86
Discharge: HOME | End: 2021-10-14
Attending: EMERGENCY MEDICINE | Admitting: EMERGENCY MEDICINE
Payer: COMMERCIAL

## 2021-10-14 ENCOUNTER — OFFICE VISIT (OUTPATIENT)
Dept: URGENT CARE | Facility: URGENT CARE | Age: 86
End: 2021-10-14
Payer: COMMERCIAL

## 2021-10-14 VITALS
HEART RATE: 75 BPM | RESPIRATION RATE: 16 BRPM | OXYGEN SATURATION: 96 % | SYSTOLIC BLOOD PRESSURE: 128 MMHG | BODY MASS INDEX: 28.68 KG/M2 | DIASTOLIC BLOOD PRESSURE: 80 MMHG | WEIGHT: 142 LBS

## 2021-10-14 VITALS
OXYGEN SATURATION: 96 % | DIASTOLIC BLOOD PRESSURE: 83 MMHG | RESPIRATION RATE: 18 BRPM | SYSTOLIC BLOOD PRESSURE: 150 MMHG | TEMPERATURE: 98.9 F | HEART RATE: 73 BPM

## 2021-10-14 DIAGNOSIS — Z53.9 DIAGNOSIS NOT YET DEFINED: Primary | ICD-10-CM

## 2021-10-14 DIAGNOSIS — L03.113 CELLULITIS OF RIGHT ELBOW: ICD-10-CM

## 2021-10-14 PROCEDURE — 73070 X-RAY EXAM OF ELBOW: CPT | Mod: RT

## 2021-10-14 PROCEDURE — 99283 EMERGENCY DEPT VISIT LOW MDM: CPT

## 2021-10-14 RX ORDER — CIPROFLOXACIN 500 MG/1
500 TABLET, FILM COATED ORAL 2 TIMES DAILY
Qty: 14 TABLET | Refills: 0 | Status: SHIPPED | OUTPATIENT
Start: 2021-10-14 | End: 2021-10-21

## 2021-10-14 ASSESSMENT — ENCOUNTER SYMPTOMS
JOINT SWELLING: 1
FEVER: 0
COLOR CHANGE: 1

## 2021-10-15 NOTE — PROGRESS NOTES
TRIAGE:  89 yr old female with h/o right elbow pain and ? H/o bursitis presents with worsening right elbow and arm pain.   Here with her son who recalls she had a steroid injection done to the elbow about 2 weeks ago.  Pain worsening and she was seen again a week later.  Continued c/o elbow pain since then and he feels it has worsened the past few days.  He did not notice any redness to the posterior elbow until tonight.  No drainage noticed by him/patient.   She is diabetic.  Painful to move the elbow/arm at all now.  Extensive bruising to region, elbow feels warm to touch, but not boggy or swollen.   To ER for evaluation with consideration for recent injection to this joint.

## 2021-10-15 NOTE — ED PROVIDER NOTES
History     Chief Complaint:  Elbow Pain     The history is provided by the patient and a relative.      Mitali Richardson is a 89 year old female with history of type II diabetes mellitus, COPD, CVA, hyperlipidemia, tobacco use who presents with right elbow discoloration and pain. She notes swelling as well. She denies falls or trauma to the elbow, but her son notes that she had a cortisone shot in that elbow a few weeks ago for bursitis. Last course of antibiotics was 3-4 weeks ago possibly clindamycin.    Review of Systems   Constitutional: Negative for fever.   Musculoskeletal: Positive for joint swelling.   Skin: Positive for color change.   All other systems reviewed and are negative.    Allergies:  Amoxicillin  Penicillins  Pregabalin  Cefaclor  Clarithromycin  Codeine  Doxycycline  Ofloxacin  Glyburide  Metformin  Morphine  Sulfa Drugs  Tequin  Cephalexin  Flonase  Gatifloxacin     Medications:  Albuterol  Zyrtec  Questran  Qtern  Lasix  Gabapentin  Amaryl   Insulin  Duoneb  Metformin  Omeprazole    Past Medical History:     COPD  Diabetes mellitus, type II  Maxillary sinus cyst  Deviated nasal septum  Cholecystitis  CVA  Osteoporosis  Alcohol abuse  Diabetic peripheral neuropathy  Iron deficiency anemia  Hypomagnesemia  Hyperlipidemia  GERD  Diverticulitis  Osteoarthritis  Lung nodule  PVD  Vitamin B6 deficiency  Tobacco use disorder  Sensorineural hearing loss  Senile dementia  Vitamin D deficiency   Chronic rhinitis  Vertigo  Pancreatitis   Hiatal hernia    Past Surgical History:    Cholecystectomy  Hysterectomy  Carpal tunnel revisions  Bladder cyst removal  Colon resection  Toe surgery  Hernia repair  Colonoscopy  EGD  Radiofrequency ablation    Family History:    Father: prostate cancer  Brother: diabetes mellitus, heart disease    Social History:  Presents with a family member,  Presents via car.    Physical Exam     Patient Vitals for the past 24 hrs:   BP Temp Temp src Pulse Resp SpO2   10/14/21  2352 -- -- -- -- 18 --   10/14/21 2345 (!) 150/83 -- -- 73 -- 96 %   10/14/21 2042 (!) 157/100 98.9  F (37.2  C) Oral 78 18 97 %       Physical Exam  Constitutional: Patient is well appearing. No distress.  Head: Atraumatic.  Eyes: Conjunctivae and EOM are normal. No scleral icterus.  Neck: Normal range of motion. Neck supple.   Cardiovascular: Normal rate, regular rhythm, normal heart sounds and intact distal pulses.   Pulmonary/Chest: Breath sounds normal. No respiratory distress.  Abdominal: Soft. Bowel sounds are normal. No distension. No tenderness. No rebound or guarding.   Musculoskeletal: Normal range of motion. No edema or tenderness. Right elbow free range of motion painlessly in flexion, extension, pronation and supination  Neurological: Alert and orientated to person, place, and time. No observable focal neuro deficit. Distally intact.  Skin: Warm and dry. No rash noted. Not diaphoretic. Skin has bruising. It appears in a stage of healing which is what concerned the son.     Emergency Department Course     Imaging:  Elbow XR, 2 views, right   Final Result   IMPRESSION: No acute fracture or dislocation. Small joint effusion.        Report per radiology    Emergency Department Course:  Reviewed:  I reviewed nursing notes, vitals, past medical history and Care Everywhere    Assessments:  2243 I obtained history and examined the patient as noted above.     Disposition:  The patient was discharged to home.     Impression & Plan     Medical Decision Making:  Mitali Richardson is a 89 year old female who presents for evaluation of skin redness on her right elbow.  The history, physical exam and supporting data are consistent with cellulitis.  There do not appear at this time to be any complication of cellulitis including necrotizing fascitis, lymphangitis, lymphadenitis, abscess, osteomyelitis, sepsis, or shock.  The patient is not immunosuppressed.  Supportive outpatient management is indicated with  antibiotics.  Close follow-up of primary care physician to ensure no progression and rapid resolution.  Area of cellulitis was outlined.  All questions and concerns were answered. The patient was discharged home and recommended to follow up with her primary physician and return with any new or worsening symptoms.     Diagnosis:    ICD-10-CM    1. Cellulitis of right elbow  L03.113        Discharge Medications:  New Prescriptions    CIPROFLOXACIN (CIPRO) 500 MG TABLET    Take 1 tablet (500 mg) by mouth 2 times daily for 7 days       Scribe Disclosure:  I, Kylee Miramontes, am serving as a scribe at 10:13 PM on 10/14/2021 to document services personally performed by Mike Bagley MD based on my observations and the provider's statements to me.      Mike Bagley MD  10/14/21 5054

## 2021-10-15 NOTE — ED TRIAGE NOTES
Pt states right elbow bursitis approximately 2 weeks ago. Pt states has had multiple follow ups since initial treatment for bursitis and earlier today was told by clinic to come to ED for further evaluation. Redness and swelling noted to posterior right elbow. ABCs intact Baseline Mental Status

## 2021-10-18 ENCOUNTER — CARE COORDINATION (OUTPATIENT)
Dept: FAMILY MEDICINE | Facility: CLINIC | Age: 86
End: 2021-10-18

## 2021-10-18 NOTE — PROGRESS NOTES
Care Coordination Initial Assessment    The patient was seen at [facility] ER on 10/14/21 for cellulitis of the right elbow. She was discharged with instructions to follow up with PCP.    PCP: Sherrie Briceno    Referral Source:  ED/IP List    Utilization:   Upcoming Appt.: Nothing currently scheduled at this time.    Health Maintenance Reviewed: Yes    Current Medical Health Concerns:   See patients current medical problem list.     Patient/Caregiver Understanding: Yes    Medication Management:   N/A    Functional Status:   N/A    Current Behavioral Health Concerns:   N/A    Patient/Caregiver Understanding:  N/A    Psychosocial:  N/A    Gaps:    N/A    Resources Given:    N/A    Plan:   I attempted to reach the patient by phone but was not successful. I left a message on her voicemail for her to call us back to get an ER follow up visit scheduled. A [employer] message was sent to the patient as well.

## 2021-10-26 ENCOUNTER — LAB REQUISITION (OUTPATIENT)
Dept: LAB | Facility: CLINIC | Age: 86
End: 2021-10-26
Payer: COMMERCIAL

## 2021-10-26 DIAGNOSIS — R10.2 PELVIC AND PERINEAL PAIN: ICD-10-CM

## 2021-10-26 LAB
ALBUMIN UR-MCNC: NEGATIVE MG/DL
APPEARANCE UR: CLEAR
BILIRUB UR QL STRIP: NEGATIVE
COLOR UR AUTO: ABNORMAL
GLUCOSE UR STRIP-MCNC: >1000 MG/DL
HGB UR QL STRIP: NEGATIVE
KETONES UR STRIP-MCNC: NEGATIVE MG/DL
LEUKOCYTE ESTERASE UR QL STRIP: NEGATIVE
MUCOUS THREADS #/AREA URNS LPF: PRESENT /LPF
NITRATE UR QL: NEGATIVE
PH UR STRIP: 7.5 [PH] (ref 5–7)
RBC URINE: 1 /HPF
SP GR UR STRIP: 1.02 (ref 1–1.03)
SQUAMOUS EPITHELIAL: <1 /HPF
UROBILINOGEN UR STRIP-MCNC: <2 MG/DL
WBC URINE: 1 /HPF

## 2021-10-26 PROCEDURE — 87086 URINE CULTURE/COLONY COUNT: CPT | Mod: ORL | Performed by: NURSE PRACTITIONER

## 2021-10-26 PROCEDURE — 81001 URINALYSIS AUTO W/SCOPE: CPT | Mod: ORL | Performed by: NURSE PRACTITIONER

## 2021-10-27 ENCOUNTER — TELEPHONE (OUTPATIENT)
Dept: FAMILY MEDICINE | Facility: CLINIC | Age: 86
End: 2021-10-27

## 2021-10-27 NOTE — TELEPHONE ENCOUNTER
Patient's son Juan Ramon called, requesting a referral to Dr Briceno's preferred podiatry clinic. She was seen in the [facility] on 9/11/2021 and was referred to Dr. MARIN Daugherty.  Routing to Dr Briceno - does this patient need to be seen for this referral?

## 2021-10-28 LAB — BACTERIA UR CULT: NO GROWTH

## 2021-10-29 NOTE — TELEPHONE ENCOUNTER
Spoke with pt's son, this is unrelated to her visit at [facility] for her feet. One of the nurses from [facility] [place] where she lives noticed she has some discoloration to feet/ rubbing from shoes. She has previously seen a podiatrist in [place], they were just hoping to see where we prefer now that pt lives in [place].    Please call Juan Ramon(pt's home number)    Thanks, Desiree CRAIG

## 2021-10-29 NOTE — TELEPHONE ENCOUNTER
I talked with patients son Juan Ramon regarding podiatry recommendation. Could not take the information at this time, is driving. I will send a MY CHART message with     [facility]  [address]  [address]  [place] [address] [phone] -- appt line  [phone] -- fax     Dr. Girish [place] ( [place] ) & [facility]  [address] [place] [address] [phone] -- appt line  [phone] -- fax

## 2021-11-01 ENCOUNTER — TRANSFERRED RECORDS (OUTPATIENT)
Dept: FAMILY MEDICINE | Facility: CLINIC | Age: 86
End: 2021-11-01

## 2021-11-06 ENCOUNTER — OFFICE VISIT (OUTPATIENT)
Dept: URGENT CARE | Facility: URGENT CARE | Age: 86
End: 2021-11-06
Payer: COMMERCIAL

## 2021-11-06 VITALS
HEART RATE: 81 BPM | WEIGHT: 145.9 LBS | SYSTOLIC BLOOD PRESSURE: 130 MMHG | TEMPERATURE: 98.3 F | RESPIRATION RATE: 16 BRPM | BODY MASS INDEX: 29.47 KG/M2 | DIASTOLIC BLOOD PRESSURE: 66 MMHG | OXYGEN SATURATION: 96 %

## 2021-11-06 DIAGNOSIS — L08.9 TOE INFECTION: Primary | ICD-10-CM

## 2021-11-06 PROCEDURE — 99213 OFFICE O/P EST LOW 20 MIN: CPT | Performed by: FAMILY MEDICINE

## 2021-11-06 RX ORDER — CLINDAMYCIN HCL 300 MG
300 CAPSULE ORAL 3 TIMES DAILY
Qty: 15 CAPSULE | Refills: 0 | Status: SHIPPED | OUTPATIENT
Start: 2021-11-06 | End: 2021-11-11

## 2021-11-06 NOTE — PATIENT INSTRUCTIONS
Antibiotic Clindamycin three times a day for 5 days      If symptoms worsen go in to see a Doctor right away

## 2021-11-06 NOTE — PROGRESS NOTES
Assessment & Plan     Toe infection  - clindamycin (CLEOCIN) 300 MG capsule  Dispense: 15 capsule; Refill: 0     On active drainage. Does have diabetes which increases risk of infection. Close monitoring of symptoms. Start clindamycin for 5 day course at this time ( TID)   Patient has an extensive 'allergy' list to antibiotics. We selected an antibiotic    Cristofer Bass MD   [facility]    Subjective     Mitali is a 89 year old female who presents to clinic today for the following health issues:  Chief Complaint   Patient presents with     Toe Pain     possible infection Right vgreat toe     HPI    Nurse at living facility recommended visit today due to possible concern for infeciton    Mitali was seen by a foot specialist 4 days ago diagnosed with bunion, concerns for friction between toes and was advised to use pads.    She has a sore on her foot additionally there is some redness of the great toe     Patient Active Problem List    Diagnosis Date Noted     Hyperlipidemia 09/10/2021     Priority: Medium     Osteoarthritis 09/10/2021     Priority: Medium     Other diseases of lung, not elsewhere classified 09/10/2021     Priority: Medium     Formatting of this note might be different from the original.  Reviewed CT scan from 4-06.    No significant change from 11-03.    Likely benign given stability.    May 2016: The 11 mm nodule within the medial aspect of   the right lower lobe has remained stable dating back to 4/9/2014.       Vitamin B6 deficiency 10/27/2020     Priority: Medium     Age-related osteoporosis without current pathological fracture 02/12/2020     Priority: Medium     Obstructive sleep apnea (adult) (pediatric) 04/25/2019     Priority: Medium     Senile dementia without behavioral disturbance (H) 04/22/2019     Priority: Medium     Diabetic peripheral neuropathy (H) 03/13/2019     Priority: Medium     PVD (peripheral vascular disease) (H) 03/13/2019     Priority: Medium     Lung  nodule 03/07/2019     Priority: Medium     Iron deficiency anemia 08/27/2018     Priority: Medium     Hypomagnesemia 07/03/2018     Priority: Medium     Vitamin D deficiency 07/03/2018     Priority: Medium     Sensorineural hearing loss (SNHL), bilateral 08/01/2017     Priority: Medium     CVA (cerebral vascular accident) (H) 12/22/2015     Priority: Medium     Cholecystitis 10/30/2015     Priority: Medium     ACP (advance care planning) 12/27/2014     Priority: Medium     Formatting of this note might be different from the original.  Patient has identified Health Care Agent(s): No  Add Health Care Agents: No  Patient has Advance Care Plan Documents (Health Care Directive, POLST): No.    Patient has identified Specific Treatment Preferences: Yes   Specific Treatment Preferences: a.) Code Status:  CPR/Attempt Resuscitation       Maxillary sinus cyst 03/03/2014     Priority: Medium     Deviated nasal septum 03/03/2014     Priority: Medium     Hx of antibiotic allergy 03/03/2014     Priority: Medium     Dizziness 03/03/2014     Priority: Medium     Use of rolling walker as ambulatory aid 03/03/2014     Priority: Medium     Type 2 diabetes, HbA1c goal < 7% (H) 01/15/2014     Priority: Medium     Chronic obstructive pulmonary disease, unspecified COPD type (H) 01/15/2014     Priority: Medium     Abdominal pain 09/15/2008     Priority: Medium     Formatting of this note might be different from the original.  W/ loose stool & elevated lactate. CT showed no definite diverticulitis       Alcohol abuse 08/08/2006     Priority: Medium     Formatting of this note might be different from the original.  History of alcohol abuse, sober X decades.       GERD without esophagitis 08/08/2006     Priority: Medium     Tobacco use disorder 08/08/2006     Priority: Medium     Pruritic disorder 04/01/2006     Priority: Medium     Diverticulitis of colon 10/28/2005     Priority: Medium     Formatting of this note might be different from the  original.  COLON RESECTION,7/2000       Health Care Home 09/10/2021     Priority: Low       Current Outpatient Medications   Medication     clindamycin (CLEOCIN) 300 MG capsule     acetaminophen (TYLENOL) 500 MG tablet     albuterol (PROAIR HFA) 108 (90 BASE) MCG/ACT inhaler     ANTACID CALCIUM 500 MG chewable tablet     carboxymethylcellulose PF (CARBOXYMETHYLCELLULOSE SODIUM) 0.5 % ophthalmic solution     cetirizine (ZYRTEC) 10 MG tablet     cholestyramine (QUESTRAN) 4 g packet     cyanocobalamin (VITAMIN B-12) 1000 MCG tablet     Dapagliflozin-sAXagliptin (QTERN) 10-5 MG TABS     DEEP SEA NASAL SPRAY 0.65 % nasal spray     diclofenac (VOLTAREN) 1 % topical gel     furosemide (LASIX) 20 MG tablet     gabapentin (NEURONTIN) 300 MG capsule     glimepiride (AMARYL) 4 MG tablet     glucose (BD GLUCOSE) 4 g chewable tablet     insulin glargine (LANTUS VIAL) 100 UNIT/ML vial     insulin pen needle (BD AUTOSHIELD DUO) 30G X 5 MM     ipratropium - albuterol 0.5 mg/2.5 mg/3 mL (DUONEB) 0.5-2.5 (3) MG/3ML neb solution     ketoconazole (NIZORAL) 2 % external shampoo     magnesium gluconate (MAGONATE) 500 (27 Mg) MG tablet     metFORMIN (GLUCOPHAGE-XR) 500 MG 24 hr tablet     miconazole with skin protectant (NALDO ANTIFUNGAL) 2 % CREA cream     mometasone (NASONEX) 50 MCG/ACT nasal spray     MUCUS RELIEF 600 MG 12 hr tablet     omeprazole (PRILOSEC) 20 MG DR capsule     psyllium (METAMUCIL) 28.3 % POWD     vitamin B6 (PYRIDOXINE) 100 MG tablet     Vitamin D, Cholecalciferol, 50 MCG (2000 UT) CAPS     No current facility-administered medications for this visit.         Objective    /66 (BP Location: Right arm, Patient Position: Chair, Cuff Size: Adult Regular)   Pulse 81   Temp 98.3  F (36.8  C) (Oral)   Resp 16   Wt 66.2 kg (145 lb 14.4 oz)   SpO2 96%   Breastfeeding No   BMI 29.47 kg/m    Physical Exam     R great toe: non-draining < 1 cm ulcer on the lateral side (dorsum). No active drainage. Mild erythema of  the toe.     No results found for any visits on 11/06/21.            The use of Dragon/[employer] dictation services may have been used to construct the content in this note; any grammatical or spelling errors are non-intentional. Please contact the author of this note directly if you are in need of any clarification.

## 2021-11-14 ENCOUNTER — LAB REQUISITION (OUTPATIENT)
Dept: LAB | Facility: CLINIC | Age: 86
End: 2021-11-14
Payer: COMMERCIAL

## 2021-11-14 DIAGNOSIS — R19.7 DIARRHEA, UNSPECIFIED: ICD-10-CM

## 2021-11-14 PROCEDURE — 87493 C DIFF AMPLIFIED PROBE: CPT | Mod: ORL | Performed by: FAMILY MEDICINE

## 2021-11-15 LAB — C DIFF TOX B STL QL: NEGATIVE

## 2021-12-20 ENCOUNTER — LAB REQUISITION (OUTPATIENT)
Dept: LAB | Facility: CLINIC | Age: 86
End: 2021-12-20
Payer: COMMERCIAL

## 2021-12-20 DIAGNOSIS — L03.90 CELLULITIS, UNSPECIFIED: ICD-10-CM

## 2021-12-21 LAB
CREAT SERPL-MCNC: 0.68 MG/DL (ref 0.6–1.1)
GFR SERPL CREATININE-BSD FRML MDRD: 77 ML/MIN/1.73M2
VANCOMYCIN SERPL-MCNC: 41.6 MG/L

## 2021-12-21 PROCEDURE — P9603 ONE-WAY ALLOW PRORATED MILES: HCPCS | Mod: ORL | Performed by: NURSE PRACTITIONER

## 2021-12-21 PROCEDURE — 80202 ASSAY OF VANCOMYCIN: CPT | Mod: ORL | Performed by: NURSE PRACTITIONER

## 2021-12-21 PROCEDURE — 36415 COLL VENOUS BLD VENIPUNCTURE: CPT | Mod: ORL | Performed by: NURSE PRACTITIONER

## 2021-12-21 PROCEDURE — 82565 ASSAY OF CREATININE: CPT | Performed by: NURSE PRACTITIONER

## 2021-12-23 ENCOUNTER — LAB REQUISITION (OUTPATIENT)
Dept: LAB | Facility: CLINIC | Age: 86
End: 2021-12-23
Payer: COMMERCIAL

## 2021-12-23 DIAGNOSIS — L03.90 CELLULITIS, UNSPECIFIED: ICD-10-CM

## 2021-12-23 LAB
CREAT SERPL-MCNC: 0.83 MG/DL (ref 0.6–1.1)
GFR SERPL CREATININE-BSD FRML MDRD: 67 ML/MIN/1.73M2
VANCOMYCIN SERPL-MCNC: 12.9 MG/L

## 2021-12-23 PROCEDURE — 80202 ASSAY OF VANCOMYCIN: CPT | Performed by: NURSE PRACTITIONER

## 2021-12-23 PROCEDURE — 36415 COLL VENOUS BLD VENIPUNCTURE: CPT | Performed by: NURSE PRACTITIONER

## 2021-12-23 PROCEDURE — P9603 ONE-WAY ALLOW PRORATED MILES: HCPCS | Performed by: NURSE PRACTITIONER

## 2021-12-23 PROCEDURE — 82565 ASSAY OF CREATININE: CPT | Performed by: NURSE PRACTITIONER

## 2021-12-27 ENCOUNTER — LAB REQUISITION (OUTPATIENT)
Dept: LAB | Facility: CLINIC | Age: 86
End: 2021-12-27
Payer: COMMERCIAL

## 2021-12-27 DIAGNOSIS — Z79.899 OTHER LONG TERM (CURRENT) DRUG THERAPY: ICD-10-CM

## 2021-12-27 LAB
AST SERPL W P-5'-P-CCNC: 23 U/L (ref 0–40)
BASOPHILS # BLD AUTO: 0.1 10E3/UL (ref 0–0.2)
BASOPHILS NFR BLD AUTO: 1 %
C REACTIVE PROTEIN LHE: 0.3 MG/DL (ref 0–0.8)
CREAT SERPL-MCNC: 0.77 MG/DL (ref 0.6–1.1)
EOSINOPHIL # BLD AUTO: 0.3 10E3/UL (ref 0–0.7)
EOSINOPHIL NFR BLD AUTO: 3 %
ERYTHROCYTE [DISTWIDTH] IN BLOOD BY AUTOMATED COUNT: 16.1 % (ref 10–15)
GFR SERPL CREATININE-BSD FRML MDRD: 73 ML/MIN/1.73M2
HCT VFR BLD AUTO: 40.1 % (ref 35–47)
HGB BLD-MCNC: 12.2 G/DL (ref 11.7–15.7)
IMM GRANULOCYTES # BLD: 0 10E3/UL
IMM GRANULOCYTES NFR BLD: 0 %
LYMPHOCYTES # BLD AUTO: 1.5 10E3/UL (ref 0.8–5.3)
LYMPHOCYTES NFR BLD AUTO: 17 %
MCH RBC QN AUTO: 27.1 PG (ref 26.5–33)
MCHC RBC AUTO-ENTMCNC: 30.4 G/DL (ref 31.5–36.5)
MCV RBC AUTO: 89 FL (ref 78–100)
MONOCYTES # BLD AUTO: 0.6 10E3/UL (ref 0–1.3)
MONOCYTES NFR BLD AUTO: 7 %
NEUTROPHILS # BLD AUTO: 6.4 10E3/UL (ref 1.6–8.3)
NEUTROPHILS NFR BLD AUTO: 72 %
NRBC # BLD AUTO: 0 10E3/UL
NRBC BLD AUTO-RTO: 0 /100
PLATELET # BLD AUTO: 157 10E3/UL (ref 150–450)
RBC # BLD AUTO: 4.51 10E6/UL (ref 3.8–5.2)
VANCOMYCIN SERPL-MCNC: 12.7 MG/L
WBC # BLD AUTO: 8.9 10E3/UL (ref 4–11)

## 2021-12-27 PROCEDURE — 86140 C-REACTIVE PROTEIN: CPT | Performed by: FAMILY MEDICINE

## 2021-12-27 PROCEDURE — 86141 C-REACTIVE PROTEIN HS: CPT | Mod: ORL | Performed by: FAMILY MEDICINE

## 2021-12-27 PROCEDURE — 80202 ASSAY OF VANCOMYCIN: CPT | Performed by: FAMILY MEDICINE

## 2021-12-27 PROCEDURE — 36415 COLL VENOUS BLD VENIPUNCTURE: CPT | Performed by: FAMILY MEDICINE

## 2021-12-27 PROCEDURE — 84450 TRANSFERASE (AST) (SGOT): CPT | Performed by: FAMILY MEDICINE

## 2021-12-27 PROCEDURE — 82565 ASSAY OF CREATININE: CPT | Mod: ORL | Performed by: FAMILY MEDICINE

## 2021-12-27 PROCEDURE — 85025 COMPLETE CBC W/AUTO DIFF WBC: CPT | Mod: ORL | Performed by: FAMILY MEDICINE

## 2021-12-29 ENCOUNTER — LAB REQUISITION (OUTPATIENT)
Dept: LAB | Facility: CLINIC | Age: 86
End: 2021-12-29
Payer: COMMERCIAL

## 2021-12-29 DIAGNOSIS — Z79.899 OTHER LONG TERM (CURRENT) DRUG THERAPY: ICD-10-CM

## 2021-12-30 ENCOUNTER — LAB REQUISITION (OUTPATIENT)
Dept: LAB | Facility: CLINIC | Age: 86
End: 2021-12-30
Payer: COMMERCIAL

## 2021-12-30 DIAGNOSIS — Z79.899 OTHER LONG TERM (CURRENT) DRUG THERAPY: ICD-10-CM

## 2021-12-30 LAB
CREAT SERPL-MCNC: 0.9 MG/DL (ref 0.6–1.1)
GFR SERPL CREATININE-BSD FRML MDRD: 60 ML/MIN/1.73M2
VANCOMYCIN SERPL-MCNC: 13 MG/L

## 2021-12-30 PROCEDURE — 80202 ASSAY OF VANCOMYCIN: CPT | Performed by: NURSE PRACTITIONER

## 2021-12-30 PROCEDURE — P9603 ONE-WAY ALLOW PRORATED MILES: HCPCS | Performed by: NURSE PRACTITIONER

## 2021-12-30 PROCEDURE — 36415 COLL VENOUS BLD VENIPUNCTURE: CPT | Performed by: NURSE PRACTITIONER

## 2021-12-30 PROCEDURE — 82565 ASSAY OF CREATININE: CPT | Performed by: NURSE PRACTITIONER

## 2021-12-31 ENCOUNTER — LAB REQUISITION (OUTPATIENT)
Dept: LAB | Facility: CLINIC | Age: 86
End: 2021-12-31
Payer: COMMERCIAL

## 2021-12-31 DIAGNOSIS — Z51.81 ENCOUNTER FOR THERAPEUTIC DRUG LEVEL MONITORING: ICD-10-CM

## 2022-01-03 LAB
AST SERPL W P-5'-P-CCNC: 29 U/L (ref 0–40)
BASOPHILS # BLD AUTO: 0.1 10E3/UL (ref 0–0.2)
BASOPHILS NFR BLD AUTO: 1 %
C REACTIVE PROTEIN LHE: 0.3 MG/DL (ref 0–0.8)
CREAT SERPL-MCNC: 0.77 MG/DL (ref 0.6–1.1)
EOSINOPHIL # BLD AUTO: 0.3 10E3/UL (ref 0–0.7)
EOSINOPHIL NFR BLD AUTO: 3 %
ERYTHROCYTE [DISTWIDTH] IN BLOOD BY AUTOMATED COUNT: 16 % (ref 10–15)
GFR SERPL CREATININE-BSD FRML MDRD: 73 ML/MIN/1.73M2
HCT VFR BLD AUTO: 42.7 % (ref 35–47)
HGB BLD-MCNC: 13.5 G/DL (ref 11.7–15.7)
IMM GRANULOCYTES # BLD: 0 10E3/UL
IMM GRANULOCYTES NFR BLD: 0 %
LYMPHOCYTES # BLD AUTO: 1.6 10E3/UL (ref 0.8–5.3)
LYMPHOCYTES NFR BLD AUTO: 16 %
MCH RBC QN AUTO: 26.9 PG (ref 26.5–33)
MCHC RBC AUTO-ENTMCNC: 31.6 G/DL (ref 31.5–36.5)
MCV RBC AUTO: 85 FL (ref 78–100)
MONOCYTES # BLD AUTO: 0.8 10E3/UL (ref 0–1.3)
MONOCYTES NFR BLD AUTO: 8 %
NEUTROPHILS # BLD AUTO: 7.3 10E3/UL (ref 1.6–8.3)
NEUTROPHILS NFR BLD AUTO: 72 %
NRBC # BLD AUTO: 0 10E3/UL
NRBC BLD AUTO-RTO: 0 /100
PLATELET # BLD AUTO: 153 10E3/UL (ref 150–450)
RBC # BLD AUTO: 5.01 10E6/UL (ref 3.8–5.2)
VANCOMYCIN SERPL-MCNC: 16.9 MG/L
WBC # BLD AUTO: 10.2 10E3/UL (ref 4–11)

## 2022-01-03 PROCEDURE — 80202 ASSAY OF VANCOMYCIN: CPT | Performed by: NURSE PRACTITIONER

## 2022-01-03 PROCEDURE — P9603 ONE-WAY ALLOW PRORATED MILES: HCPCS | Performed by: NURSE PRACTITIONER

## 2022-01-03 PROCEDURE — 36415 COLL VENOUS BLD VENIPUNCTURE: CPT | Mod: ORL | Performed by: NURSE PRACTITIONER

## 2022-01-03 PROCEDURE — 86140 C-REACTIVE PROTEIN: CPT | Performed by: NURSE PRACTITIONER

## 2022-01-03 PROCEDURE — 82565 ASSAY OF CREATININE: CPT | Mod: ORL | Performed by: NURSE PRACTITIONER

## 2022-01-03 PROCEDURE — 84450 TRANSFERASE (AST) (SGOT): CPT | Performed by: NURSE PRACTITIONER

## 2022-01-03 PROCEDURE — 86141 C-REACTIVE PROTEIN HS: CPT | Mod: ORL | Performed by: NURSE PRACTITIONER

## 2022-01-03 PROCEDURE — 85025 COMPLETE CBC W/AUTO DIFF WBC: CPT | Mod: ORL | Performed by: NURSE PRACTITIONER

## 2022-01-06 ENCOUNTER — LAB REQUISITION (OUTPATIENT)
Dept: LAB | Facility: CLINIC | Age: 87
End: 2022-01-06
Payer: COMMERCIAL

## 2022-01-06 DIAGNOSIS — Z79.899 OTHER LONG TERM (CURRENT) DRUG THERAPY: ICD-10-CM

## 2022-01-09 ENCOUNTER — LAB REQUISITION (OUTPATIENT)
Dept: LAB | Facility: CLINIC | Age: 87
End: 2022-01-09
Payer: COMMERCIAL

## 2022-01-09 DIAGNOSIS — Z79.899 OTHER LONG TERM (CURRENT) DRUG THERAPY: ICD-10-CM

## 2022-01-10 LAB
AST SERPL W P-5'-P-CCNC: 24 U/L (ref 0–40)
BASOPHILS # BLD AUTO: 0.1 10E3/UL (ref 0–0.2)
BASOPHILS NFR BLD AUTO: 1 %
C REACTIVE PROTEIN LHE: 0.2 MG/DL (ref 0–0.8)
CREAT SERPL-MCNC: 0.67 MG/DL (ref 0.6–1.1)
EOSINOPHIL # BLD AUTO: 0.5 10E3/UL (ref 0–0.7)
EOSINOPHIL NFR BLD AUTO: 7 %
ERYTHROCYTE [DISTWIDTH] IN BLOOD BY AUTOMATED COUNT: 15.6 % (ref 10–15)
GFR SERPL CREATININE-BSD FRML MDRD: 83 ML/MIN/1.73M2
HCT VFR BLD AUTO: 41 % (ref 35–47)
HGB BLD-MCNC: 13.1 G/DL (ref 11.7–15.7)
IMM GRANULOCYTES # BLD: 0 10E3/UL
IMM GRANULOCYTES NFR BLD: 1 %
LYMPHOCYTES # BLD AUTO: 1.2 10E3/UL (ref 0.8–5.3)
LYMPHOCYTES NFR BLD AUTO: 15 %
MCH RBC QN AUTO: 27.5 PG (ref 26.5–33)
MCHC RBC AUTO-ENTMCNC: 32 G/DL (ref 31.5–36.5)
MCV RBC AUTO: 86 FL (ref 78–100)
MONOCYTES # BLD AUTO: 0.5 10E3/UL (ref 0–1.3)
MONOCYTES NFR BLD AUTO: 7 %
NEUTROPHILS # BLD AUTO: 5.5 10E3/UL (ref 1.6–8.3)
NEUTROPHILS NFR BLD AUTO: 69 %
NRBC # BLD AUTO: 0 10E3/UL
NRBC BLD AUTO-RTO: 0 /100
PLATELET # BLD AUTO: 115 10E3/UL (ref 150–450)
RBC # BLD AUTO: 4.77 10E6/UL (ref 3.8–5.2)
VANCOMYCIN SERPL-MCNC: 15.7 MG/L
WBC # BLD AUTO: 7.9 10E3/UL (ref 4–11)

## 2022-01-10 PROCEDURE — 82565 ASSAY OF CREATININE: CPT | Mod: ORL | Performed by: NURSE PRACTITIONER

## 2022-01-10 PROCEDURE — 80202 ASSAY OF VANCOMYCIN: CPT | Performed by: NURSE PRACTITIONER

## 2022-01-10 PROCEDURE — 84450 TRANSFERASE (AST) (SGOT): CPT | Performed by: NURSE PRACTITIONER

## 2022-01-10 PROCEDURE — 86140 C-REACTIVE PROTEIN: CPT | Mod: ORL | Performed by: NURSE PRACTITIONER

## 2022-01-10 PROCEDURE — 85025 COMPLETE CBC W/AUTO DIFF WBC: CPT | Performed by: NURSE PRACTITIONER

## 2022-01-10 PROCEDURE — 36415 COLL VENOUS BLD VENIPUNCTURE: CPT | Performed by: NURSE PRACTITIONER

## 2022-01-10 PROCEDURE — P9604 ONE-WAY ALLOW PRORATED TRIP: HCPCS | Performed by: NURSE PRACTITIONER

## 2022-01-12 ENCOUNTER — LAB REQUISITION (OUTPATIENT)
Dept: LAB | Facility: CLINIC | Age: 87
End: 2022-01-12
Payer: COMMERCIAL

## 2022-01-12 DIAGNOSIS — Z79.899 OTHER LONG TERM (CURRENT) DRUG THERAPY: ICD-10-CM

## 2022-01-13 LAB
ALBUMIN SERPL-MCNC: 3.3 G/DL (ref 3.5–5)
ALP SERPL-CCNC: 46 U/L (ref 45–120)
ALT SERPL W P-5'-P-CCNC: 11 U/L (ref 0–45)
ANION GAP SERPL CALCULATED.3IONS-SCNC: 10 MMOL/L (ref 5–18)
AST SERPL W P-5'-P-CCNC: 23 U/L (ref 0–40)
BILIRUB DIRECT SERPL-MCNC: 0.2 MG/DL
BILIRUB SERPL-MCNC: 0.4 MG/DL (ref 0–1)
BUN SERPL-MCNC: 12 MG/DL (ref 8–28)
CALCIUM SERPL-MCNC: 9.1 MG/DL (ref 8.5–10.5)
CHLORIDE BLD-SCNC: 105 MMOL/L (ref 98–107)
CHOLEST SERPL-MCNC: 100 MG/DL
CO2 SERPL-SCNC: 25 MMOL/L (ref 22–31)
CREAT SERPL-MCNC: 0.75 MG/DL (ref 0.6–1.1)
DEPRECATED CALCIDIOL+CALCIFEROL SERPL-MC: 46 UG/L (ref 30–80)
ERYTHROCYTE [DISTWIDTH] IN BLOOD BY AUTOMATED COUNT: 15.7 % (ref 10–15)
FASTING STATUS PATIENT QL REPORTED: ABNORMAL
GFR SERPL CREATININE-BSD FRML MDRD: 75 ML/MIN/1.73M2
GLUCOSE BLD-MCNC: 71 MG/DL (ref 70–125)
HBA1C MFR BLD: 7.1 %
HCT VFR BLD AUTO: 42.7 % (ref 35–47)
HDLC SERPL-MCNC: 27 MG/DL
HGB BLD-MCNC: 13 G/DL (ref 11.7–15.7)
LDLC SERPL CALC-MCNC: 35 MG/DL
MAGNESIUM SERPL-MCNC: 1.2 MG/DL (ref 1.8–2.6)
MCH RBC QN AUTO: 26.2 PG (ref 26.5–33)
MCHC RBC AUTO-ENTMCNC: 30.4 G/DL (ref 31.5–36.5)
MCV RBC AUTO: 86 FL (ref 78–100)
PHOSPHATE SERPL-MCNC: 2.6 MG/DL (ref 2.5–4.5)
PLATELET # BLD AUTO: 156 10E3/UL (ref 150–450)
POTASSIUM BLD-SCNC: 3.8 MMOL/L (ref 3.5–5)
PROT SERPL-MCNC: 6.8 G/DL (ref 6–8)
PTH-INTACT SERPL-MCNC: 106 PG/ML (ref 10–86)
RBC # BLD AUTO: 4.97 10E6/UL (ref 3.8–5.2)
SODIUM SERPL-SCNC: 140 MMOL/L (ref 136–145)
TRIGL SERPL-MCNC: 192 MG/DL
TSH SERPL DL<=0.005 MIU/L-ACNC: 3.98 UIU/ML (ref 0.3–5)
WBC # BLD AUTO: 9.6 10E3/UL (ref 4–11)

## 2022-01-13 PROCEDURE — 85027 COMPLETE CBC AUTOMATED: CPT | Mod: ORL | Performed by: NURSE PRACTITIONER

## 2022-01-13 PROCEDURE — 83970 ASSAY OF PARATHORMONE: CPT | Mod: ORL | Performed by: NURSE PRACTITIONER

## 2022-01-13 PROCEDURE — 82248 BILIRUBIN DIRECT: CPT | Mod: ORL | Performed by: NURSE PRACTITIONER

## 2022-01-13 PROCEDURE — 82306 VITAMIN D 25 HYDROXY: CPT | Performed by: NURSE PRACTITIONER

## 2022-01-13 PROCEDURE — 84100 ASSAY OF PHOSPHORUS: CPT | Performed by: NURSE PRACTITIONER

## 2022-01-13 PROCEDURE — 36415 COLL VENOUS BLD VENIPUNCTURE: CPT | Mod: ORL | Performed by: NURSE PRACTITIONER

## 2022-01-13 PROCEDURE — P9603 ONE-WAY ALLOW PRORATED MILES: HCPCS | Performed by: NURSE PRACTITIONER

## 2022-01-13 PROCEDURE — 83036 HEMOGLOBIN GLYCOSYLATED A1C: CPT | Mod: ORL | Performed by: NURSE PRACTITIONER

## 2022-01-13 PROCEDURE — 83735 ASSAY OF MAGNESIUM: CPT | Mod: ORL | Performed by: NURSE PRACTITIONER

## 2022-01-13 PROCEDURE — 80061 LIPID PANEL: CPT | Performed by: NURSE PRACTITIONER

## 2022-01-13 PROCEDURE — 83519 RIA NONANTIBODY: CPT | Mod: ORL | Performed by: NURSE PRACTITIONER

## 2022-01-13 PROCEDURE — 84443 ASSAY THYROID STIM HORMONE: CPT | Mod: ORL | Performed by: NURSE PRACTITIONER

## 2022-01-17 ENCOUNTER — LAB REQUISITION (OUTPATIENT)
Dept: LAB | Facility: CLINIC | Age: 87
End: 2022-01-17
Payer: COMMERCIAL

## 2022-01-17 DIAGNOSIS — Z79.899 OTHER LONG TERM (CURRENT) DRUG THERAPY: ICD-10-CM

## 2022-01-17 LAB
AST SERPL W P-5'-P-CCNC: 26 U/L (ref 0–40)
BASOPHILS # BLD AUTO: 0.1 10E3/UL (ref 0–0.2)
BASOPHILS NFR BLD AUTO: 1 %
C REACTIVE PROTEIN LHE: 0.2 MG/DL (ref 0–0.8)
CREAT SERPL-MCNC: 0.73 MG/DL (ref 0.6–1.1)
EOSINOPHIL # BLD AUTO: 0.4 10E3/UL (ref 0–0.7)
EOSINOPHIL NFR BLD AUTO: 4 %
ERYTHROCYTE [DISTWIDTH] IN BLOOD BY AUTOMATED COUNT: 15.6 % (ref 10–15)
GFR SERPL CREATININE-BSD FRML MDRD: 78 ML/MIN/1.73M2
HCT VFR BLD AUTO: 40.8 % (ref 35–47)
HGB BLD-MCNC: 12.6 G/DL (ref 11.7–15.7)
IMM GRANULOCYTES # BLD: 0 10E3/UL
IMM GRANULOCYTES NFR BLD: 0 %
LYMPHOCYTES # BLD AUTO: 1.6 10E3/UL (ref 0.8–5.3)
LYMPHOCYTES NFR BLD AUTO: 16 %
MCH RBC QN AUTO: 26.6 PG (ref 26.5–33)
MCHC RBC AUTO-ENTMCNC: 30.9 G/DL (ref 31.5–36.5)
MCV RBC AUTO: 86 FL (ref 78–100)
MONOCYTES # BLD AUTO: 0.7 10E3/UL (ref 0–1.3)
MONOCYTES NFR BLD AUTO: 7 %
NEUTROPHILS # BLD AUTO: 7.2 10E3/UL (ref 1.6–8.3)
NEUTROPHILS NFR BLD AUTO: 72 %
NRBC # BLD AUTO: 0 10E3/UL
NRBC BLD AUTO-RTO: 0 /100
PLATELET # BLD AUTO: 167 10E3/UL (ref 150–450)
RBC # BLD AUTO: 4.74 10E6/UL (ref 3.8–5.2)
VANCOMYCIN SERPL-MCNC: 16.4 MG/L
WBC # BLD AUTO: 10 10E3/UL (ref 4–11)

## 2022-01-17 PROCEDURE — 80202 ASSAY OF VANCOMYCIN: CPT | Performed by: NURSE PRACTITIONER

## 2022-01-17 PROCEDURE — 86140 C-REACTIVE PROTEIN: CPT | Performed by: NURSE PRACTITIONER

## 2022-01-17 PROCEDURE — P9603 ONE-WAY ALLOW PRORATED MILES: HCPCS | Performed by: NURSE PRACTITIONER

## 2022-01-17 PROCEDURE — 36415 COLL VENOUS BLD VENIPUNCTURE: CPT | Performed by: NURSE PRACTITIONER

## 2022-01-17 PROCEDURE — 85025 COMPLETE CBC W/AUTO DIFF WBC: CPT | Performed by: NURSE PRACTITIONER

## 2022-01-17 PROCEDURE — 84450 TRANSFERASE (AST) (SGOT): CPT | Performed by: NURSE PRACTITIONER

## 2022-01-17 PROCEDURE — 82565 ASSAY OF CREATININE: CPT | Performed by: NURSE PRACTITIONER

## 2022-01-18 LAB — PINP SER-MCNC: 48 UG/L

## 2022-01-19 ENCOUNTER — LAB REQUISITION (OUTPATIENT)
Dept: LAB | Facility: CLINIC | Age: 87
End: 2022-01-19
Payer: COMMERCIAL

## 2022-01-19 DIAGNOSIS — Z79.899 OTHER LONG TERM (CURRENT) DRUG THERAPY: ICD-10-CM

## 2022-01-21 LAB
CREAT SERPL-MCNC: 0.7 MG/DL (ref 0.6–1.1)
GFR SERPL CREATININE-BSD FRML MDRD: 82 ML/MIN/1.73M2
MAGNESIUM SERPL-MCNC: 1.4 MG/DL (ref 1.8–2.6)

## 2022-01-21 PROCEDURE — P9603 ONE-WAY ALLOW PRORATED MILES: HCPCS | Performed by: NURSE PRACTITIONER

## 2022-01-21 PROCEDURE — 82565 ASSAY OF CREATININE: CPT | Performed by: NURSE PRACTITIONER

## 2022-01-21 PROCEDURE — 36415 COLL VENOUS BLD VENIPUNCTURE: CPT | Performed by: NURSE PRACTITIONER

## 2022-01-21 PROCEDURE — 83735 ASSAY OF MAGNESIUM: CPT | Performed by: NURSE PRACTITIONER

## 2022-01-23 ENCOUNTER — LAB REQUISITION (OUTPATIENT)
Dept: LAB | Facility: CLINIC | Age: 87
End: 2022-01-23
Payer: COMMERCIAL

## 2022-01-23 DIAGNOSIS — Z79.899 OTHER LONG TERM (CURRENT) DRUG THERAPY: ICD-10-CM

## 2022-01-24 LAB
AST SERPL W P-5'-P-CCNC: 25 U/L (ref 0–40)
BASOPHILS # BLD AUTO: 0.1 10E3/UL (ref 0–0.2)
BASOPHILS NFR BLD AUTO: 1 %
C REACTIVE PROTEIN LHE: 0.3 MG/DL (ref 0–0.8)
CREAT SERPL-MCNC: 0.82 MG/DL (ref 0.6–1.1)
EOSINOPHIL # BLD AUTO: 0.3 10E3/UL (ref 0–0.7)
EOSINOPHIL NFR BLD AUTO: 3 %
ERYTHROCYTE [DISTWIDTH] IN BLOOD BY AUTOMATED COUNT: 15.8 % (ref 10–15)
GFR SERPL CREATININE-BSD FRML MDRD: 68 ML/MIN/1.73M2
HCT VFR BLD AUTO: 41.9 % (ref 35–47)
HGB BLD-MCNC: 13 G/DL (ref 11.7–15.7)
IMM GRANULOCYTES # BLD: 0 10E3/UL
IMM GRANULOCYTES NFR BLD: 0 %
LYMPHOCYTES # BLD AUTO: 1.8 10E3/UL (ref 0.8–5.3)
LYMPHOCYTES NFR BLD AUTO: 19 %
MAGNESIUM SERPL-MCNC: 1.4 MG/DL (ref 1.8–2.6)
MCH RBC QN AUTO: 26.4 PG (ref 26.5–33)
MCHC RBC AUTO-ENTMCNC: 31 G/DL (ref 31.5–36.5)
MCV RBC AUTO: 85 FL (ref 78–100)
MONOCYTES # BLD AUTO: 0.8 10E3/UL (ref 0–1.3)
MONOCYTES NFR BLD AUTO: 9 %
NEUTROPHILS # BLD AUTO: 6.7 10E3/UL (ref 1.6–8.3)
NEUTROPHILS NFR BLD AUTO: 68 %
NRBC # BLD AUTO: 0 10E3/UL
NRBC BLD AUTO-RTO: 0 /100
PLATELET # BLD AUTO: 121 10E3/UL (ref 150–450)
RBC # BLD AUTO: 4.92 10E6/UL (ref 3.8–5.2)
VANCOMYCIN SERPL-MCNC: 15.7 MG/L
WBC # BLD AUTO: 9.8 10E3/UL (ref 4–11)

## 2022-01-24 PROCEDURE — 84450 TRANSFERASE (AST) (SGOT): CPT | Performed by: NURSE PRACTITIONER

## 2022-01-24 PROCEDURE — 85025 COMPLETE CBC W/AUTO DIFF WBC: CPT | Performed by: NURSE PRACTITIONER

## 2022-01-24 PROCEDURE — 82565 ASSAY OF CREATININE: CPT | Performed by: NURSE PRACTITIONER

## 2022-01-24 PROCEDURE — 36415 COLL VENOUS BLD VENIPUNCTURE: CPT | Performed by: NURSE PRACTITIONER

## 2022-01-24 PROCEDURE — 86140 C-REACTIVE PROTEIN: CPT | Performed by: NURSE PRACTITIONER

## 2022-01-24 PROCEDURE — P9603 ONE-WAY ALLOW PRORATED MILES: HCPCS | Performed by: NURSE PRACTITIONER

## 2022-01-24 PROCEDURE — 80202 ASSAY OF VANCOMYCIN: CPT | Performed by: NURSE PRACTITIONER

## 2022-01-24 PROCEDURE — 83735 ASSAY OF MAGNESIUM: CPT | Performed by: NURSE PRACTITIONER

## 2022-01-26 ENCOUNTER — LAB REQUISITION (OUTPATIENT)
Dept: LAB | Facility: CLINIC | Age: 87
End: 2022-01-26
Payer: COMMERCIAL

## 2022-01-26 DIAGNOSIS — Z79.899 OTHER LONG TERM (CURRENT) DRUG THERAPY: ICD-10-CM

## 2022-01-27 LAB
BASOPHILS # BLD AUTO: 0.1 10E3/UL (ref 0–0.2)
BASOPHILS NFR BLD AUTO: 1 %
CREAT SERPL-MCNC: 0.78 MG/DL (ref 0.6–1.1)
EOSINOPHIL # BLD AUTO: 0.3 10E3/UL (ref 0–0.7)
EOSINOPHIL NFR BLD AUTO: 3 %
ERYTHROCYTE [DISTWIDTH] IN BLOOD BY AUTOMATED COUNT: 15.9 % (ref 10–15)
GFR SERPL CREATININE-BSD FRML MDRD: 72 ML/MIN/1.73M2
HCT VFR BLD AUTO: 43 % (ref 35–47)
HGB BLD-MCNC: 12.9 G/DL (ref 11.7–15.7)
IMM GRANULOCYTES # BLD: 0 10E3/UL
IMM GRANULOCYTES NFR BLD: 0 %
LYMPHOCYTES # BLD AUTO: 1.7 10E3/UL (ref 0.8–5.3)
LYMPHOCYTES NFR BLD AUTO: 17 %
MCH RBC QN AUTO: 26.2 PG (ref 26.5–33)
MCHC RBC AUTO-ENTMCNC: 30 G/DL (ref 31.5–36.5)
MCV RBC AUTO: 87 FL (ref 78–100)
MONOCYTES # BLD AUTO: 0.7 10E3/UL (ref 0–1.3)
MONOCYTES NFR BLD AUTO: 7 %
NEUTROPHILS # BLD AUTO: 6.8 10E3/UL (ref 1.6–8.3)
NEUTROPHILS NFR BLD AUTO: 72 %
NRBC # BLD AUTO: 0 10E3/UL
NRBC BLD AUTO-RTO: 0 /100
PLATELET # BLD AUTO: 271 10E3/UL (ref 150–450)
RBC # BLD AUTO: 4.92 10E6/UL (ref 3.8–5.2)
WBC # BLD AUTO: 9.6 10E3/UL (ref 4–11)

## 2022-01-27 PROCEDURE — 85025 COMPLETE CBC W/AUTO DIFF WBC: CPT | Performed by: NURSE PRACTITIONER

## 2022-01-27 PROCEDURE — P9603 ONE-WAY ALLOW PRORATED MILES: HCPCS | Performed by: NURSE PRACTITIONER

## 2022-01-27 PROCEDURE — 82565 ASSAY OF CREATININE: CPT | Performed by: NURSE PRACTITIONER

## 2022-01-27 PROCEDURE — 36415 COLL VENOUS BLD VENIPUNCTURE: CPT | Performed by: NURSE PRACTITIONER

## 2022-01-28 ENCOUNTER — LAB REQUISITION (OUTPATIENT)
Dept: LAB | Facility: CLINIC | Age: 87
End: 2022-01-28
Payer: COMMERCIAL

## 2022-01-28 DIAGNOSIS — Z03.818 ENCOUNTER FOR OBSERVATION FOR SUSPECTED EXPOSURE TO OTHER BIOLOGICAL AGENTS RULED OUT: ICD-10-CM

## 2022-01-28 PROCEDURE — U0003 INFECTIOUS AGENT DETECTION BY NUCLEIC ACID (DNA OR RNA); SEVERE ACUTE RESPIRATORY SYNDROME CORONAVIRUS 2 (SARS-COV-2) (CORONAVIRUS DISEASE [COVID-19]), AMPLIFIED PROBE TECHNIQUE, MAKING USE OF HIGH THROUGHPUT TECHNOLOGIES AS DESCRIBED BY CMS-2020-01-R: HCPCS | Mod: ORL | Performed by: FAMILY MEDICINE

## 2022-01-29 LAB — SARS-COV-2 RNA RESP QL NAA+PROBE: NEGATIVE

## 2022-03-23 ENCOUNTER — LAB REQUISITION (OUTPATIENT)
Dept: LAB | Facility: CLINIC | Age: 87
End: 2022-03-23
Payer: COMMERCIAL

## 2022-03-23 DIAGNOSIS — R19.2 VISIBLE PERISTALSIS: ICD-10-CM

## 2022-03-24 LAB
ANION GAP SERPL CALCULATED.3IONS-SCNC: 10 MMOL/L (ref 5–18)
BUN SERPL-MCNC: 17 MG/DL (ref 8–28)
CALCIUM SERPL-MCNC: 9.1 MG/DL (ref 8.5–10.5)
CHLORIDE BLD-SCNC: 104 MMOL/L (ref 98–107)
CO2 SERPL-SCNC: 24 MMOL/L (ref 22–31)
CREAT SERPL-MCNC: 0.74 MG/DL (ref 0.6–1.1)
GFR SERPL CREATININE-BSD FRML MDRD: 76 ML/MIN/1.73M2
GLUCOSE BLD-MCNC: 115 MG/DL (ref 70–125)
MAGNESIUM SERPL-MCNC: 1.7 MG/DL (ref 1.8–2.6)
POTASSIUM BLD-SCNC: 4.9 MMOL/L (ref 3.5–5)
SODIUM SERPL-SCNC: 138 MMOL/L (ref 136–145)

## 2022-03-24 PROCEDURE — 80048 BASIC METABOLIC PNL TOTAL CA: CPT | Mod: ORL | Performed by: NURSE PRACTITIONER

## 2022-03-24 PROCEDURE — P9603 ONE-WAY ALLOW PRORATED MILES: HCPCS | Mod: ORL | Performed by: NURSE PRACTITIONER

## 2022-03-24 PROCEDURE — 83735 ASSAY OF MAGNESIUM: CPT | Mod: ORL | Performed by: NURSE PRACTITIONER

## 2022-03-24 PROCEDURE — 36415 COLL VENOUS BLD VENIPUNCTURE: CPT | Mod: ORL | Performed by: NURSE PRACTITIONER

## 2022-04-23 ENCOUNTER — HEALTH MAINTENANCE LETTER (OUTPATIENT)
Age: 87
End: 2022-04-23

## 2022-05-11 ENCOUNTER — LAB REQUISITION (OUTPATIENT)
Dept: LAB | Facility: CLINIC | Age: 87
End: 2022-05-11
Payer: COMMERCIAL

## 2022-05-11 DIAGNOSIS — E83.42 HYPOMAGNESEMIA: ICD-10-CM

## 2022-05-12 LAB — MAGNESIUM SERPL-MCNC: 1.7 MG/DL (ref 1.8–2.6)

## 2022-05-12 PROCEDURE — 36415 COLL VENOUS BLD VENIPUNCTURE: CPT | Mod: ORL | Performed by: INTERNAL MEDICINE

## 2022-05-12 PROCEDURE — 83735 ASSAY OF MAGNESIUM: CPT | Mod: ORL | Performed by: INTERNAL MEDICINE

## 2022-05-12 PROCEDURE — P9603 ONE-WAY ALLOW PRORATED MILES: HCPCS | Mod: ORL | Performed by: INTERNAL MEDICINE

## 2022-06-08 ENCOUNTER — LAB REQUISITION (OUTPATIENT)
Dept: LAB | Facility: CLINIC | Age: 87
End: 2022-06-08
Payer: COMMERCIAL

## 2022-06-08 DIAGNOSIS — F32.9 MAJOR DEPRESSIVE DISORDER, SINGLE EPISODE, UNSPECIFIED: ICD-10-CM

## 2022-06-08 DIAGNOSIS — E11.9 TYPE 2 DIABETES MELLITUS WITHOUT COMPLICATIONS (H): ICD-10-CM

## 2022-06-08 DIAGNOSIS — D64.9 ANEMIA, UNSPECIFIED: ICD-10-CM

## 2022-06-09 LAB
ANION GAP SERPL CALCULATED.3IONS-SCNC: 12 MMOL/L (ref 5–18)
BUN SERPL-MCNC: 17 MG/DL (ref 8–28)
CALCIUM SERPL-MCNC: 9.4 MG/DL (ref 8.5–10.5)
CHLORIDE BLD-SCNC: 102 MMOL/L (ref 98–107)
CO2 SERPL-SCNC: 26 MMOL/L (ref 22–31)
CREAT SERPL-MCNC: 0.82 MG/DL (ref 0.6–1.1)
ERYTHROCYTE [DISTWIDTH] IN BLOOD BY AUTOMATED COUNT: 15.1 % (ref 10–15)
GFR SERPL CREATININE-BSD FRML MDRD: 67 ML/MIN/1.73M2
GLUCOSE BLD-MCNC: 192 MG/DL (ref 70–125)
HBA1C MFR BLD: 7.8 %
HCT VFR BLD AUTO: 42.6 % (ref 35–47)
HGB BLD-MCNC: 12.7 G/DL (ref 11.7–15.7)
MCH RBC QN AUTO: 27.4 PG (ref 26.5–33)
MCHC RBC AUTO-ENTMCNC: 29.8 G/DL (ref 31.5–36.5)
MCV RBC AUTO: 92 FL (ref 78–100)
PLATELET # BLD AUTO: 157 10E3/UL (ref 150–450)
POTASSIUM BLD-SCNC: 4.4 MMOL/L (ref 3.5–5)
RBC # BLD AUTO: 4.64 10E6/UL (ref 3.8–5.2)
SODIUM SERPL-SCNC: 140 MMOL/L (ref 136–145)
TSH SERPL DL<=0.005 MIU/L-ACNC: 1.3 UIU/ML (ref 0.3–5)
WBC # BLD AUTO: 8.4 10E3/UL (ref 4–11)

## 2022-06-09 PROCEDURE — 83036 HEMOGLOBIN GLYCOSYLATED A1C: CPT | Mod: ORL | Performed by: NURSE PRACTITIONER

## 2022-06-09 PROCEDURE — 84443 ASSAY THYROID STIM HORMONE: CPT | Mod: ORL | Performed by: NURSE PRACTITIONER

## 2022-06-09 PROCEDURE — 85027 COMPLETE CBC AUTOMATED: CPT | Mod: ORL | Performed by: NURSE PRACTITIONER

## 2022-06-09 PROCEDURE — 36415 COLL VENOUS BLD VENIPUNCTURE: CPT | Mod: ORL | Performed by: NURSE PRACTITIONER

## 2022-06-09 PROCEDURE — P9603 ONE-WAY ALLOW PRORATED MILES: HCPCS | Mod: ORL | Performed by: NURSE PRACTITIONER

## 2022-06-09 PROCEDURE — 80048 BASIC METABOLIC PNL TOTAL CA: CPT | Mod: ORL | Performed by: NURSE PRACTITIONER

## 2022-07-14 ENCOUNTER — LAB REQUISITION (OUTPATIENT)
Dept: LAB | Facility: CLINIC | Age: 87
End: 2022-07-14
Payer: COMMERCIAL

## 2022-07-14 DIAGNOSIS — I10 ESSENTIAL (PRIMARY) HYPERTENSION: ICD-10-CM

## 2022-07-14 DIAGNOSIS — D64.9 ANEMIA, UNSPECIFIED: ICD-10-CM

## 2022-07-14 DIAGNOSIS — E11.9 TYPE 2 DIABETES MELLITUS WITHOUT COMPLICATIONS (H): ICD-10-CM

## 2022-07-14 LAB
ANION GAP SERPL CALCULATED.3IONS-SCNC: 11 MMOL/L (ref 7–15)
BUN SERPL-MCNC: 20.5 MG/DL (ref 8–23)
CALCIUM SERPL-MCNC: 9.1 MG/DL (ref 8.2–9.6)
CHLORIDE SERPL-SCNC: 102 MMOL/L (ref 98–107)
CREAT SERPL-MCNC: 0.78 MG/DL (ref 0.51–0.95)
DEPRECATED HCO3 PLAS-SCNC: 25 MMOL/L (ref 22–29)
ERYTHROCYTE [DISTWIDTH] IN BLOOD BY AUTOMATED COUNT: 14.4 % (ref 10–15)
GFR SERPL CREATININE-BSD FRML MDRD: 71 ML/MIN/1.73M2
GLUCOSE SERPL-MCNC: 134 MG/DL (ref 70–99)
HBA1C MFR BLD: 8.9 %
HCT VFR BLD AUTO: 39.3 % (ref 35–47)
HGB BLD-MCNC: 12 G/DL (ref 11.7–15.7)
MCH RBC QN AUTO: 27.1 PG (ref 26.5–33)
MCHC RBC AUTO-ENTMCNC: 30.5 G/DL (ref 31.5–36.5)
MCV RBC AUTO: 89 FL (ref 78–100)
PLATELET # BLD AUTO: 129 10E3/UL (ref 150–450)
POTASSIUM SERPL-SCNC: 4.8 MMOL/L (ref 3.4–5.3)
RBC # BLD AUTO: 4.43 10E6/UL (ref 3.8–5.2)
SODIUM SERPL-SCNC: 138 MMOL/L (ref 136–145)
WBC # BLD AUTO: 7.4 10E3/UL (ref 4–11)

## 2022-07-14 PROCEDURE — P9603 ONE-WAY ALLOW PRORATED MILES: HCPCS | Mod: ORL | Performed by: NURSE PRACTITIONER

## 2022-07-14 PROCEDURE — 80048 BASIC METABOLIC PNL TOTAL CA: CPT | Mod: ORL | Performed by: NURSE PRACTITIONER

## 2022-07-14 PROCEDURE — 85027 COMPLETE CBC AUTOMATED: CPT | Mod: ORL | Performed by: NURSE PRACTITIONER

## 2022-07-14 PROCEDURE — 36415 COLL VENOUS BLD VENIPUNCTURE: CPT | Mod: ORL | Performed by: NURSE PRACTITIONER

## 2022-07-14 PROCEDURE — 83036 HEMOGLOBIN GLYCOSYLATED A1C: CPT | Mod: ORL | Performed by: NURSE PRACTITIONER

## 2022-07-16 ENCOUNTER — LAB REQUISITION (OUTPATIENT)
Dept: LAB | Facility: CLINIC | Age: 87
End: 2022-07-16
Payer: COMMERCIAL

## 2022-07-16 DIAGNOSIS — E11.9 TYPE 2 DIABETES MELLITUS WITHOUT COMPLICATIONS (H): ICD-10-CM

## 2022-07-16 DIAGNOSIS — I10 ESSENTIAL (PRIMARY) HYPERTENSION: ICD-10-CM

## 2022-07-16 DIAGNOSIS — D64.9 ANEMIA, UNSPECIFIED: ICD-10-CM

## 2022-07-24 ENCOUNTER — HOSPITAL ENCOUNTER (INPATIENT)
Facility: CLINIC | Age: 87
LOS: 4 days | Discharge: HOME HEALTH | DRG: 637 | End: 2022-07-28
Attending: EMERGENCY MEDICINE | Admitting: STUDENT IN AN ORGANIZED HEALTH CARE EDUCATION/TRAINING PROGRAM
Payer: COMMERCIAL

## 2022-07-24 ENCOUNTER — APPOINTMENT (OUTPATIENT)
Dept: GENERAL RADIOLOGY | Facility: CLINIC | Age: 87
DRG: 637 | End: 2022-07-24
Attending: EMERGENCY MEDICINE
Payer: COMMERCIAL

## 2022-07-24 DIAGNOSIS — E11.9 TYPE 2 DIABETES, HBA1C GOAL < 7% (H): Primary | ICD-10-CM

## 2022-07-24 DIAGNOSIS — I73.9 PERIPHERAL ARTERIAL DISEASE (H): ICD-10-CM

## 2022-07-24 DIAGNOSIS — L03.115 CELLULITIS OF RIGHT LOWER EXTREMITY: ICD-10-CM

## 2022-07-24 DIAGNOSIS — L08.9 TOE INFECTION: ICD-10-CM

## 2022-07-24 DIAGNOSIS — R09.02 HYPOXIA: ICD-10-CM

## 2022-07-24 LAB
ANION GAP SERPL CALCULATED.3IONS-SCNC: 7 MMOL/L (ref 3–14)
BASOPHILS # BLD AUTO: 0.1 10E3/UL (ref 0–0.2)
BASOPHILS NFR BLD AUTO: 0 %
BUN SERPL-MCNC: 28 MG/DL (ref 7–30)
CALCIUM SERPL-MCNC: 9.1 MG/DL (ref 8.5–10.1)
CHLORIDE BLD-SCNC: 98 MMOL/L (ref 94–109)
CO2 SERPL-SCNC: 26 MMOL/L (ref 20–32)
CREAT SERPL-MCNC: 0.85 MG/DL (ref 0.52–1.04)
CRP SERPL-MCNC: 75.1 MG/L (ref 0–8)
EOSINOPHIL # BLD AUTO: 0 10E3/UL (ref 0–0.7)
EOSINOPHIL NFR BLD AUTO: 0 %
ERYTHROCYTE [DISTWIDTH] IN BLOOD BY AUTOMATED COUNT: 14.7 % (ref 10–15)
ERYTHROCYTE [SEDIMENTATION RATE] IN BLOOD BY WESTERGREN METHOD: 26 MM/HR (ref 0–30)
GFR SERPL CREATININE-BSD FRML MDRD: 64 ML/MIN/1.73M2
GLUCOSE BLD-MCNC: 136 MG/DL (ref 70–99)
GLUCOSE BLDC GLUCOMTR-MCNC: 134 MG/DL (ref 70–99)
GLUCOSE BLDC GLUCOMTR-MCNC: 215 MG/DL (ref 70–99)
HCT VFR BLD AUTO: 41.7 % (ref 35–47)
HGB BLD-MCNC: 12.8 G/DL (ref 11.7–15.7)
HOLD SPECIMEN: NORMAL
IMM GRANULOCYTES # BLD: 0.1 10E3/UL
IMM GRANULOCYTES NFR BLD: 1 %
LACTATE SERPL-SCNC: 1.4 MMOL/L (ref 0.7–2)
LYMPHOCYTES # BLD AUTO: 1 10E3/UL (ref 0.8–5.3)
LYMPHOCYTES NFR BLD AUTO: 8 %
MAGNESIUM SERPL-MCNC: 2 MG/DL (ref 1.6–2.3)
MCH RBC QN AUTO: 27.1 PG (ref 26.5–33)
MCHC RBC AUTO-ENTMCNC: 30.7 G/DL (ref 31.5–36.5)
MCV RBC AUTO: 88 FL (ref 78–100)
MONOCYTES # BLD AUTO: 1 10E3/UL (ref 0–1.3)
MONOCYTES NFR BLD AUTO: 8 %
NEUTROPHILS # BLD AUTO: 10.5 10E3/UL (ref 1.6–8.3)
NEUTROPHILS NFR BLD AUTO: 83 %
NRBC # BLD AUTO: 0 10E3/UL
NRBC BLD AUTO-RTO: 0 /100
PHOSPHATE SERPL-MCNC: 2.4 MG/DL (ref 2.5–4.5)
PLATELET # BLD AUTO: 188 10E3/UL (ref 150–450)
POTASSIUM BLD-SCNC: 3.6 MMOL/L (ref 3.4–5.3)
RBC # BLD AUTO: 4.72 10E6/UL (ref 3.8–5.2)
SARS-COV-2 RNA RESP QL NAA+PROBE: NEGATIVE
SODIUM SERPL-SCNC: 131 MMOL/L (ref 133–144)
WBC # BLD AUTO: 12.6 10E3/UL (ref 4–11)

## 2022-07-24 PROCEDURE — 250N000013 HC RX MED GY IP 250 OP 250 PS 637: Performed by: STUDENT IN AN ORGANIZED HEALTH CARE EDUCATION/TRAINING PROGRAM

## 2022-07-24 PROCEDURE — 250N000011 HC RX IP 250 OP 636: Performed by: STUDENT IN AN ORGANIZED HEALTH CARE EDUCATION/TRAINING PROGRAM

## 2022-07-24 PROCEDURE — 96365 THER/PROPH/DIAG IV INF INIT: CPT

## 2022-07-24 PROCEDURE — 99285 EMERGENCY DEPT VISIT HI MDM: CPT

## 2022-07-24 PROCEDURE — 36415 COLL VENOUS BLD VENIPUNCTURE: CPT | Performed by: EMERGENCY MEDICINE

## 2022-07-24 PROCEDURE — 83605 ASSAY OF LACTIC ACID: CPT | Performed by: EMERGENCY MEDICINE

## 2022-07-24 PROCEDURE — 120N000001 HC R&B MED SURG/OB

## 2022-07-24 PROCEDURE — 73660 X-RAY EXAM OF TOE(S): CPT | Mod: RT

## 2022-07-24 PROCEDURE — 258N000003 HC RX IP 258 OP 636: Performed by: STUDENT IN AN ORGANIZED HEALTH CARE EDUCATION/TRAINING PROGRAM

## 2022-07-24 PROCEDURE — 96375 TX/PRO/DX INJ NEW DRUG ADDON: CPT

## 2022-07-24 PROCEDURE — 85014 HEMATOCRIT: CPT | Performed by: EMERGENCY MEDICINE

## 2022-07-24 PROCEDURE — C9803 HOPD COVID-19 SPEC COLLECT: HCPCS

## 2022-07-24 PROCEDURE — 85652 RBC SED RATE AUTOMATED: CPT | Performed by: EMERGENCY MEDICINE

## 2022-07-24 PROCEDURE — 87149 DNA/RNA DIRECT PROBE: CPT | Performed by: EMERGENCY MEDICINE

## 2022-07-24 PROCEDURE — 96361 HYDRATE IV INFUSION ADD-ON: CPT

## 2022-07-24 PROCEDURE — 83735 ASSAY OF MAGNESIUM: CPT | Performed by: STUDENT IN AN ORGANIZED HEALTH CARE EDUCATION/TRAINING PROGRAM

## 2022-07-24 PROCEDURE — 258N000003 HC RX IP 258 OP 636: Performed by: EMERGENCY MEDICINE

## 2022-07-24 PROCEDURE — 80048 BASIC METABOLIC PNL TOTAL CA: CPT | Performed by: EMERGENCY MEDICINE

## 2022-07-24 PROCEDURE — 99223 1ST HOSP IP/OBS HIGH 75: CPT | Mod: AI | Performed by: STUDENT IN AN ORGANIZED HEALTH CARE EDUCATION/TRAINING PROGRAM

## 2022-07-24 PROCEDURE — 250N000012 HC RX MED GY IP 250 OP 636 PS 637: Performed by: STUDENT IN AN ORGANIZED HEALTH CARE EDUCATION/TRAINING PROGRAM

## 2022-07-24 PROCEDURE — U0003 INFECTIOUS AGENT DETECTION BY NUCLEIC ACID (DNA OR RNA); SEVERE ACUTE RESPIRATORY SYNDROME CORONAVIRUS 2 (SARS-COV-2) (CORONAVIRUS DISEASE [COVID-19]), AMPLIFIED PROBE TECHNIQUE, MAKING USE OF HIGH THROUGHPUT TECHNOLOGIES AS DESCRIBED BY CMS-2020-01-R: HCPCS | Performed by: EMERGENCY MEDICINE

## 2022-07-24 PROCEDURE — 250N000011 HC RX IP 250 OP 636: Performed by: EMERGENCY MEDICINE

## 2022-07-24 PROCEDURE — 87077 CULTURE AEROBIC IDENTIFY: CPT | Performed by: EMERGENCY MEDICINE

## 2022-07-24 PROCEDURE — 86140 C-REACTIVE PROTEIN: CPT | Performed by: EMERGENCY MEDICINE

## 2022-07-24 PROCEDURE — 84100 ASSAY OF PHOSPHORUS: CPT | Performed by: STUDENT IN AN ORGANIZED HEALTH CARE EDUCATION/TRAINING PROGRAM

## 2022-07-24 RX ORDER — MULTIPLE VITAMINS W/ MINERALS TAB 9MG-400MCG
1 TAB ORAL DAILY
COMMUNITY
End: 2023-01-01

## 2022-07-24 RX ORDER — DEXTROSE MONOHYDRATE 25 G/50ML
25-50 INJECTION, SOLUTION INTRAVENOUS
Status: DISCONTINUED | OUTPATIENT
Start: 2022-07-24 | End: 2022-07-28 | Stop reason: HOSPADM

## 2022-07-24 RX ORDER — ESCITALOPRAM OXALATE 10 MG/1
10 TABLET ORAL DAILY
Status: DISCONTINUED | OUTPATIENT
Start: 2022-07-25 | End: 2022-07-28 | Stop reason: HOSPADM

## 2022-07-24 RX ORDER — LOPERAMIDE HCL 2 MG
2 CAPSULE ORAL 4 TIMES DAILY PRN
COMMUNITY

## 2022-07-24 RX ORDER — ESCITALOPRAM OXALATE 10 MG/1
10 TABLET ORAL DAILY
COMMUNITY
End: 2023-01-01

## 2022-07-24 RX ORDER — ONDANSETRON 4 MG/1
4 TABLET, ORALLY DISINTEGRATING ORAL EVERY 6 HOURS PRN
Status: DISCONTINUED | OUTPATIENT
Start: 2022-07-24 | End: 2022-07-28 | Stop reason: HOSPADM

## 2022-07-24 RX ORDER — CEFTRIAXONE 1 G/1
1 INJECTION, POWDER, FOR SOLUTION INTRAMUSCULAR; INTRAVENOUS EVERY 24 HOURS
Status: DISCONTINUED | OUTPATIENT
Start: 2022-07-25 | End: 2022-07-28 | Stop reason: HOSPADM

## 2022-07-24 RX ORDER — CEFTRIAXONE 1 G/1
1 INJECTION, POWDER, FOR SOLUTION INTRAMUSCULAR; INTRAVENOUS ONCE
Status: COMPLETED | OUTPATIENT
Start: 2022-07-24 | End: 2022-07-24

## 2022-07-24 RX ORDER — CHOLESTYRAMINE 4 G/9G
1 POWDER, FOR SUSPENSION ORAL 3 TIMES DAILY PRN
Status: DISCONTINUED | OUTPATIENT
Start: 2022-07-24 | End: 2022-07-28 | Stop reason: HOSPADM

## 2022-07-24 RX ORDER — LIDOCAINE 40 MG/G
CREAM TOPICAL
Status: DISCONTINUED | OUTPATIENT
Start: 2022-07-24 | End: 2022-07-28 | Stop reason: HOSPADM

## 2022-07-24 RX ORDER — ACETAMINOPHEN 650 MG/1
650 SUPPOSITORY RECTAL EVERY 6 HOURS PRN
Status: DISCONTINUED | OUTPATIENT
Start: 2022-07-24 | End: 2022-07-28 | Stop reason: HOSPADM

## 2022-07-24 RX ORDER — ACETAMINOPHEN 325 MG/1
650 TABLET ORAL EVERY 6 HOURS PRN
Status: DISCONTINUED | OUTPATIENT
Start: 2022-07-24 | End: 2022-07-28 | Stop reason: HOSPADM

## 2022-07-24 RX ORDER — CLINDAMYCIN HCL 300 MG
300 CAPSULE ORAL 3 TIMES DAILY
Status: ON HOLD | COMMUNITY
Start: 2022-07-22 | End: 2022-07-28

## 2022-07-24 RX ORDER — PANTOPRAZOLE SODIUM 20 MG/1
20 TABLET, DELAYED RELEASE ORAL DAILY
Status: DISCONTINUED | OUTPATIENT
Start: 2022-07-25 | End: 2022-07-28 | Stop reason: HOSPADM

## 2022-07-24 RX ORDER — ONDANSETRON 2 MG/ML
4 INJECTION INTRAMUSCULAR; INTRAVENOUS EVERY 6 HOURS PRN
Status: DISCONTINUED | OUTPATIENT
Start: 2022-07-24 | End: 2022-07-28 | Stop reason: HOSPADM

## 2022-07-24 RX ORDER — NICOTINE POLACRILEX 4 MG
15-30 LOZENGE BUCCAL
Status: DISCONTINUED | OUTPATIENT
Start: 2022-07-24 | End: 2022-07-28 | Stop reason: HOSPADM

## 2022-07-24 RX ORDER — PROCHLORPERAZINE MALEATE 5 MG
5 TABLET ORAL EVERY 6 HOURS PRN
Status: DISCONTINUED | OUTPATIENT
Start: 2022-07-24 | End: 2022-07-28 | Stop reason: HOSPADM

## 2022-07-24 RX ORDER — BACITRACIN ZINC 500 [USP'U]/G
OINTMENT TOPICAL 2 TIMES DAILY
COMMUNITY

## 2022-07-24 RX ORDER — VITAMIN B COMPLEX
2000 TABLET ORAL DAILY
Status: DISCONTINUED | OUTPATIENT
Start: 2022-07-25 | End: 2022-07-28 | Stop reason: HOSPADM

## 2022-07-24 RX ORDER — AMOXICILLIN 250 MG
2 CAPSULE ORAL 2 TIMES DAILY PRN
Status: DISCONTINUED | OUTPATIENT
Start: 2022-07-24 | End: 2022-07-28 | Stop reason: HOSPADM

## 2022-07-24 RX ORDER — CARBOXYMETHYLCELLULOSE SODIUM 5 MG/ML
1 SOLUTION/ DROPS OPHTHALMIC EVERY 4 HOURS PRN
Status: DISCONTINUED | OUTPATIENT
Start: 2022-07-24 | End: 2022-07-28 | Stop reason: HOSPADM

## 2022-07-24 RX ORDER — AMOXICILLIN 250 MG
1 CAPSULE ORAL 2 TIMES DAILY PRN
Status: DISCONTINUED | OUTPATIENT
Start: 2022-07-24 | End: 2022-07-28 | Stop reason: HOSPADM

## 2022-07-24 RX ORDER — CLINDAMYCIN PHOSPHATE 900 MG/50ML
900 INJECTION, SOLUTION INTRAVENOUS ONCE
Status: COMPLETED | OUTPATIENT
Start: 2022-07-24 | End: 2022-07-24

## 2022-07-24 RX ORDER — NYSTATIN 100000 [USP'U]/G
POWDER TOPICAL
COMMUNITY

## 2022-07-24 RX ORDER — ALBUTEROL SULFATE 90 UG/1
2 AEROSOL, METERED RESPIRATORY (INHALATION) EVERY 4 HOURS PRN
Status: DISCONTINUED | OUTPATIENT
Start: 2022-07-24 | End: 2022-07-28 | Stop reason: HOSPADM

## 2022-07-24 RX ORDER — CLINDAMYCIN PHOSPHATE 900 MG/50ML
900 INJECTION, SOLUTION INTRAVENOUS EVERY 8 HOURS
Status: DISCONTINUED | OUTPATIENT
Start: 2022-07-24 | End: 2022-07-28 | Stop reason: HOSPADM

## 2022-07-24 RX ORDER — SODIUM CHLORIDE, SODIUM LACTATE, POTASSIUM CHLORIDE, CALCIUM CHLORIDE 600; 310; 30; 20 MG/100ML; MG/100ML; MG/100ML; MG/100ML
INJECTION, SOLUTION INTRAVENOUS CONTINUOUS
Status: DISCONTINUED | OUTPATIENT
Start: 2022-07-24 | End: 2022-07-25

## 2022-07-24 RX ORDER — CHOLESTYRAMINE 4 G/9G
1 POWDER, FOR SUSPENSION ORAL 3 TIMES DAILY PRN
Status: DISCONTINUED | OUTPATIENT
Start: 2022-07-24 | End: 2022-07-24

## 2022-07-24 RX ORDER — VANCOMYCIN HYDROCHLORIDE 1 G/200ML
1000 INJECTION, SOLUTION INTRAVENOUS EVERY 24 HOURS
Status: DISCONTINUED | OUTPATIENT
Start: 2022-07-25 | End: 2022-07-26

## 2022-07-24 RX ORDER — PROCHLORPERAZINE 25 MG
12.5 SUPPOSITORY, RECTAL RECTAL EVERY 12 HOURS PRN
Status: DISCONTINUED | OUTPATIENT
Start: 2022-07-24 | End: 2022-07-28 | Stop reason: HOSPADM

## 2022-07-24 RX ORDER — CALCIUM CARBONATE 500 MG/1
1000 TABLET, CHEWABLE ORAL 2 TIMES DAILY PRN
Status: DISCONTINUED | OUTPATIENT
Start: 2022-07-24 | End: 2022-07-28 | Stop reason: HOSPADM

## 2022-07-24 RX ADMIN — CLINDAMYCIN PHOSPHATE 900 MG: 900 INJECTION, SOLUTION INTRAVENOUS at 20:08

## 2022-07-24 RX ADMIN — CEFTRIAXONE 1 G: 1 INJECTION, POWDER, FOR SOLUTION INTRAMUSCULAR; INTRAVENOUS at 13:31

## 2022-07-24 RX ADMIN — ACETAMINOPHEN 650 MG: 325 TABLET, FILM COATED ORAL at 22:45

## 2022-07-24 RX ADMIN — VANCOMYCIN HYDROCHLORIDE 1250 MG: 10 INJECTION, POWDER, LYOPHILIZED, FOR SOLUTION INTRAVENOUS at 13:49

## 2022-07-24 RX ADMIN — CLINDAMYCIN PHOSPHATE 900 MG: 900 INJECTION, SOLUTION INTRAVENOUS at 13:49

## 2022-07-24 RX ADMIN — INSULIN ASPART 2 UNITS: 100 INJECTION, SOLUTION INTRAVENOUS; SUBCUTANEOUS at 21:40

## 2022-07-24 RX ADMIN — MICONAZOLE NITRATE: 2 POWDER TOPICAL at 22:51

## 2022-07-24 RX ADMIN — SODIUM CHLORIDE, POTASSIUM CHLORIDE, SODIUM LACTATE AND CALCIUM CHLORIDE: 600; 310; 30; 20 INJECTION, SOLUTION INTRAVENOUS at 15:38

## 2022-07-24 ASSESSMENT — ENCOUNTER SYMPTOMS
WOUND: 1
CHILLS: 0
COLOR CHANGE: 1
JOINT SWELLING: 1
CONSTITUTIONAL NEGATIVE: 1
FEVER: 0

## 2022-07-24 ASSESSMENT — ACTIVITIES OF DAILY LIVING (ADL)
ADLS_ACUITY_SCORE: 37

## 2022-07-24 NOTE — PHARMACY-ADMISSION MEDICATION HISTORY
Admission medication history interview status for this patient is complete. See [facility] admission navigator for allergy information, prior to admission medications and immunization status.     Medication history interview done, indicate source(s): Caregiver  Medication history resources (including written lists, pill bottles, clinic record): MAR from [facility]  Pharmacy: unknown    Changes made to PTA medication list:  Added: excitalopram, bacitracin oint, clindamycin caps, nystatin powder, multivit, loperamide, Sarna lotion, Mag 64  Changed: Tylenol (6 hr -> three times daily prn), antiactid (added directions), Refresh drops (three times daily to every 4 hr), Questran (added directions), Qtern (added directions), Deep Sea nasal spray (added directions), diclofenac (2 gm -> 4 gm), furosemide (added frequency), gabapentin (added directions), Lantus (am -> pm), ketoconazole (added frequency), metformin ER (decrease from twice daily -> once daily), Barbara antifungal cream (added directions), Nasonex (decrease 2 -> 1 spray), Mucinex (added directions), omeprazole (added directions)  Reported as Not Taking: none  Removed: Zyrtec, B12, glimepiride, glucose tabs, DuoNeb, fiber, B6 (none listed on MAR)    Actions taken by pharmacist (provider contacted, etc): I completed the PTA med list adjustments per the MAR faxed to us from [facility]. I called the facility and spoke with the nurse who answered the line and she confirmed all evening doses last night were given and all 8:00 AM doses were given today; no recent use for as needed medications.     Additional medication history information:None    Medication reconciliation/reorder completed by provider prior to medication history?  N    Prior to Admission medications    Medication Sig Last Dose Taking? Auth Provider Long Term End Date   acetaminophen (TYLENOL) 500 MG tablet Take 500-1,000 mg by mouth 3 times daily as needed for mild pain Unknown at  Unknown time Yes Reported, Patient     albuterol (PROAIR HFA) 108 (90 BASE) MCG/ACT inhaler Inhale two puffs by mouth every 4 to 6 hours prn Unknown at Unknown time Yes Reported, Patient     ANTACID CALCIUM 500 MG chewable tablet Take 2 chew tab by mouth 2 times daily as needed Unknown at Unknown time Yes Reported, Patient     bacitracin 500 UNIT/GM external ointment Apply topically 2 times daily Apply to wound bed twice daily on right great toe. Place gauze to cover and separate toes from rubbing. Wrap dressing with Kerlix to hold in place. 7/23/2022 at PM Yes Reported, Patient     camphor-menthol (DERMASARRA) 0.5-0.5 % external lotion Apply topically 2 times daily 7/24/2022 at 0800 Yes Reported, Patient     carboxymethylcellulose PF (REFRESH PLUS) 0.5 % ophthalmic solution Place 1 drop into both eyes every 4 hours as needed for dry eyes Unknown at Unknown time Yes Reported, Patient     cholestyramine (QUESTRAN) 4 g packet Take 1 packet by mouth 3 times daily as needed Unknown at Unknown time Yes Reported, Patient Yes    clindamycin (CLEOCIN) 300 MG capsule Take 300 mg by mouth 3 times daily For 5 days. 7/24/2022 at 0800 Yes Reported, Patient  7/27/22   Dapagliflozin-sAXagliptin (QTERN) 10-5 MG TABS Take 10 mg by mouth daily 7/24/2022 at 0800 Yes Reported, Patient     DEEP SEA NASAL SPRAY 0.65 % nasal spray Spray 2 sprays in nostril every 2 hours as needed Unknown at Unknown time Yes Reported, Patient     diclofenac (VOLTAREN) 1 % topical gel Apply 4 g topically 2 times daily Apply to right ankle. 7/24/2022 at 0800 Yes Reported, Patient     escitalopram (LEXAPRO) 10 MG tablet Take 10 mg by mouth daily 7/24/2022 at 0800 Yes Reported, Patient Yes    furosemide (LASIX) 20 MG tablet Take 20 mg by mouth three times a week Take on Mon, Wed, Fri. 7/22/2022 at 0800 Yes Reported, Patient Yes    gabapentin (NEURONTIN) 300 MG capsule Take 300 mg by mouth At Bedtime 7/23/2022 at PM Yes Reported, Patient Yes    insulin glargine  (LANTUS VIAL) 100 UNIT/ML vial Inject 12 Units Subcutaneous At Bedtime 7/23/2022 at PM Yes Reported, Patient Yes    ketoconazole (NIZORAL) 2 % external shampoo Apply 1 mL topically every 7 days Past Week Yes Reported, Patient     loperamide (IMODIUM) 2 MG capsule Take 2-4 mg by mouth 4 times daily as needed for diarrhea Unknown at Unknown time Yes Reported, Patient     magnesium chloride 535 (64 Mg) MG TBEC CR tablet Take 535 mg by mouth 2 times daily 7/24/2022 at 0800 Yes Reported, Patient     metFORMIN (GLUCOPHAGE-XR) 500 MG 24 hr tablet Take 500 mg by mouth daily 7/24/2022 at 0800 Yes Sherrie Briceno MD Yes    miconazole with skin protectant (NALDO ANTIFUNGAL) 2 % CREA cream Apply to sore on buttocks as needed. Unknown at Unknown time Yes Reported, Patient     mometasone (NASONEX) 50 MCG/ACT nasal spray Spray 1 spray into both nostrils daily 7/24/2022 at 0800 Yes Reported, Patient     MUCUS RELIEF 600 MG 12 hr tablet Take 600 mg by mouth 2 times daily as needed Unknown at Unknown time Yes Reported, Patient     multivitamin w/minerals (THERA-VIT-M) tablet Take 1 tablet by mouth daily 7/24/2022 at 0800 Yes Reported, Patient     nystatin (MYCOSTATIN) 654773 UNIT/GM external powder Apply twice daily to groin folds. 7/24/2022 at 0800 Yes Reported, Patient     omeprazole (PRILOSEC) 20 MG DR capsule Take 10 mg by mouth daily 7/24/2022 at 0800 Yes Reported, Patient     Vitamin D, Cholecalciferol, 50 MCG (2000 UT) CAPS Take 2,000 Units by mouth daily 7/24/2022 at 0800 Yes Sherrie Briceno MD     insulin pen needle (BD AUTOSHIELD DUO) 30G X 5 MM USE TO INJECT INSULIN ONCE DAILY   Reported, Patient

## 2022-07-24 NOTE — PHARMACY-VANCOMYCIN DOSING SERVICE
Pharmacy Vancomycin Initial Note  Date of Service 2022  Patient's  [id number]  89 year old, female    Indication: Urinary Tract Infection    Current estimated CrCl = Estimated Creatinine Clearance: 44.8 mL/min (based on SCr of 0.85 mg/dL).    Creatinine for last 3 days  2022: 12:04 PM Creatinine 0.85 mg/dL    Recent Vancomycin Level(s) for last 3 days  No results found for requested labs within last 72 hours.      Vancomycin IV Administrations (past 72 hours)                   vancomycin 1250 mg in 0.9% NaCl 250 mL intermittent infusion 1,250 mg (mg) 1,250 mg New Bag 22 1349                Nephrotoxins and other renal medications (From now, onward)    Start     Dose/Rate Route Frequency Ordered Stop    22 1400  vancomycin (VANCOCIN) 1000 mg in dextrose 5% 200 mL PREMIX         1,000 mg  200 mL/hr over 1 Hours Intravenous EVERY 24 HOURS 22 1602            Contrast Orders - past 72 hours (72h ago, onward)    None          [employer] Prediction of Planned Initial Vancomycin Regimen  Loading dose: N/A  Regimen: 1000 mg IV every 24 hours.  Start time: 17:00 on 2022  Exposure target: AUC24 (range)400-600 mg/L.hr   AUC24,ss: 483 mg/L.hr  Probability of AUC24 > 400: 71 %  Ctrough,ss: 14.8 mg/L  Probability of Ctrough,ss > 20: 23 %  Probability of nephrotoxicity (Lodise JERO ): 10 %          Plan:  1. Start vancomycin  1000 mg IV q24h.   2. Vancomycin monitoring method: AUC  3. Vancomycin therapeutic monitoring goal: 400-600 mg*h/L  4. Pharmacy will check vancomycin levels as appropriate in 1-3 Days.    5. Serum creatinine levels will be ordered daily for the first week of therapy and at least twice weekly for subsequent weeks.      Annette Thomas, PharmD, [facility]  Emergency Medicine Clinical Pharmacist  [phone]

## 2022-07-24 NOTE — ED NOTES
[facility]  ED Nurse Handoff Report    Mitali Richardson is a 89 year old female   ED Chief complaint: No chief complaint on file.  . ED Diagnosis:   Final diagnoses:   Toe infection - great toe   Cellulitis of right lower extremity     Allergies:   Allergies   Allergen Reactions     Amoxicillin Anaphylaxis and Hives     Penicillins Anaphylaxis and Hives     Pregabalin Other (See Comments)     Other reaction(s): Sedation       Ceclor  [Cefaclor]      Clarithromycin      Codeine      Other reaction(s): Chest Pain     Doxycycline Hives     Floxin [Ofloxacin]      Has tolerated cipro in the past     Glyburide      Patient doesn't know what allergy or reaction she's had. Might potentially be due to sulfa allergy.     Metformin Other (See Comments)     Other reaction(s): lactic acidosis     Morphine Nausea and Vomiting     Strawberry Hives     Sulfa Drugs Unknown     Shakiness/sweating     Tequin      Other reaction(s): Tremors     Cephalexin Rash     Flonase [Fluticasone]      Bloody nose       Code Status:   Activity level - Baseline/Home:  Stand by Assist. Activity Level - Current:   Assist X 2. Lift room needed: No. Bariatric: No   Needed: No   Isolation: No. Infection: Not Applicable.     Vital Signs:   Vitals:    07/24/22 1159 07/24/22 1256   BP: 117/79    Pulse: 87    Resp: 16    Temp: 98.2  F (36.8  C)    TempSrc: Oral    SpO2: 98%    Weight:  65.8 kg (145 lb)       Cardiac Rhythm:  ,      Pain level:    Patient confused: Yes. Patient Falls Risk: Yes.   Elimination Status: Patient was incontinent  Patient Report - Initial Complaint: Toe Pain Focused AssessmentMitali Richardson is a 89 year old female with a history of type 2 diabetes who presents with her son for toe pain. Per patient's son, he was called from her facility about her right big toe after there was an infection, which she went on antibiotics for and was being treated at the wound clinic for. However, it has since worsened,  which prompted him to bring her in. The right great toe has an open wound and redness up to her ankle. Patient expresses pain with toe. Patient denies fevers or chills.:    Tests Performed: Xray, Blood Work. Abnormal Results:   Labs Ordered and Resulted from Time of ED Arrival to Time of ED Departure   BASIC METABOLIC PANEL - Abnormal       Result Value    Sodium 131 (*)     Potassium 3.6      Chloride 98      Carbon Dioxide (CO2) 26      Anion Gap 7      Urea Nitrogen 28      Creatinine 0.85      Calcium 9.1      Glucose 136 (*)     GFR Estimate 64     CBC WITH PLATELETS AND DIFFERENTIAL - Abnormal    WBC Count 12.6 (*)     RBC Count 4.72      Hemoglobin 12.8      Hematocrit 41.7      MCV 88      MCH 27.1      MCHC 30.7 (*)     RDW 14.7      Platelet Count 188      % Neutrophils 83      % Lymphocytes 8      % Monocytes 8      % Eosinophils 0      % Basophils 0      % Immature Granulocytes 1      NRBCs per 100 WBC 0      Absolute Neutrophils 10.5 (*)     Absolute Lymphocytes 1.0      Absolute Monocytes 1.0      Absolute Eosinophils 0.0      Absolute Basophils 0.1      Absolute Immature Granulocytes 0.1      Absolute NRBCs 0.0     CRP INFLAMMATION - Abnormal    CRP Inflammation 75.1 (*)    ERYTHROCYTE SEDIMENTATION RATE AUTO - Normal    Erythrocyte Sedimentation Rate 26     LACTIC ACID WHOLE BLOOD - Normal    Lactic Acid 1.4     COVID-19 VIRUS (CORONAVIRUS) BY PCR - Normal    SARS CoV2 PCR Negative     BLOOD CULTURE   BLOOD CULTURE     XR Toe Right G/E 2 Views   Final Result   IMPRESSION: No definite evidence of osteomyelitis. Osteopenia. Moderate degenerative changes at the IP joint of the great toe. Hammertoe deformities.             Treatments provided: Medication (see MAR)  Family Comments: Son in room  OBS brochure/video discussed/provided to patient:  N/A  ED Medications:   Medications   clindamycin (CLEOCIN) infusion 900 mg (900 mg Intravenous New Bag 7/24/22 1341)   vancomycin 1250 mg in 0.9% NaCl 250 mL  intermittent infusion 1,250 mg (1,250 mg Intravenous New Bag 7/24/22 1349)   cefTRIAXone (ROCEPHIN) 1 g vial to attach to  mL bag for ADULTS or NS 50 mL bag for PEDS (0 g Intravenous Stopped 7/24/22 1349)     Drips infusing:  No  For the majority of the shift, the patient's behavior Green. Interventions performed were Frequent rounding .    Sepsis treatment initiated: No     Patient tested for COVID 19 prior to admission: YES    ED Nurse Name/Phone Number: Emmy Ortiz RN,   1:58 PM    RECEIVING UNIT ED HANDOFF REVIEW    Above ED Nurse Handoff Report was reviewed: Yes  Reviewed by: Jenifer Huerta RN on July 24, 2022 at 5:57 PM

## 2022-07-24 NOTE — H&P
[facility]  Hospitalist Admission Note  Name: Mitali Richardson    MRN: [id number]  YOB: 1931    Age: 89 year old  Date of admission: 7/24/2022  Primary care provider: Sherrie Briceno    Chief Complaint:  Toe pain    Assessment and Plan:   Toe pain  Right toe cellulitis:  3 days of right great toe wound now with erythema that is tracking up into the foot along with increased pain, and warmth.  Wound likely related to type 2 diabetes, related polyneuropathy.  Overall concern for diabetic ulcer that has now progressed to right great toe and right foot cellulitis.  Initiated on clinda, vancomycin, ceftriaxone in the emergency department.  -Podiatry consultation  -Continue vanc/ceftri/clinda (given abx allergies)  -We will make n.p.o. at midnight  -VTE prophylaxis contraindicated  -Tylenol for pain    T2DM:  Appears to take Qtern, Amaryl, Lantus 12 units every morning at baseline.  -Lantus 5 units every morning for now  -MDSSI    COPD: No evidence of exacerbation.  Continue PTA inhalers once med rec complete    Dementia: Unreliable historian.  Delirium precautions while here.      Clinically Significant Risk Factors Present on Admission         # Hyponatremia: Na = 131 mmol/L (Ref range: 133 - 144 mmol/L) on admission, will monitor as appropriate            # DMII: A1C = N/A within past 3 months        DVT Prophylaxis: Pneumatic Compression Devices  Code Status: Full Code  Discharge Dispo: Pending clinical improvement  Estimated Disch Date / # of Days until Disch: Anticipate discharge in 3-4 days pending improvement, podiatry recommendations.      History of Present Illness:  Mitali Richardson is a 89 year old female with PMH including T2DM, COPD, dementia who presents with toe pain.    Patient is a poor historian and therefore most of history of present illness is obtained through the emergency department provider as well as the son who is at bedside.    Per the patient's son he was called  from her facility about concerns regarding her right big toe.  Per report her right great toe developed a wound about 3 days ago and has since progressively worsened.  Today, it appears that the redness is started to extend up into her foot and has become increasingly painful and therefore they brought the patient in for further evaluation.      ED workup notable for normal vitals.  The patient is normotensive, afebrile without tachycardia.  BMP with a sodium 131 but otherwise normal.  CRP is 75, CBC notable for leukocytosis 12.6 but otherwise normal.  Lactic acid is normal.  X-ray of the right great toe without evidence of osteomyelitis.  Blood cultures obtained.  COVID-negative.     Past Medical History:  Past Medical History:   Diagnosis Date     COPD (chronic obstructive pulmonary disease) (H)      Diabetes mellitus (H)     Type II insulin dependent     Past Surgical History:  Past Surgical History:   Procedure Laterality Date     LAPAROSCOPIC CHOLECYSTECTOMY WITH CHOLANGIOGRAMS N/A 10/31/2015    Procedure: LAPAROSCOPIC CHOLECYSTECTOMY WITH CHOLANGIOGRAMS;  Surgeon: Kathleen Clark MD;  Location:  OR     Social History:  Social History     Tobacco Use     Smoking status: Former Smoker     Types: Cigarettes     Smokeless tobacco: Never Used   Substance Use Topics     Alcohol use: No     Social History     Social History Narrative     Not on file     Family History:  Patient unable to report family history given dementia.    Allergies:  Allergies   Allergen Reactions     Amoxicillin Anaphylaxis and Hives     Penicillins Anaphylaxis and Hives     Pregabalin Other (See Comments)     Other reaction(s): Sedation       Ceclor  [Cefaclor]      Clarithromycin      Codeine      Other reaction(s): Chest Pain     Doxycycline Hives     Floxin [Ofloxacin]      Has tolerated cipro in the past     Glyburide      Patient doesn't know what allergy or reaction she's had. Might potentially be due to sulfa allergy.      Metformin Other (See Comments)     Other reaction(s): lactic acidosis     Morphine Nausea and Vomiting     Strawberry Hives     Sulfa Drugs Unknown     Shakiness/sweating     Tequin      Other reaction(s): Tremors     Cephalexin Rash     Flonase [Fluticasone]      Bloody nose     Medications:  No current facility-administered medications on file prior to encounter.  acetaminophen (TYLENOL) 500 MG tablet, Take 1-2 tablets (500-1,000 mg) by mouth every 6 hours as needed for mild pain  albuterol (PROAIR HFA) 108 (90 BASE) MCG/ACT inhaler, Inhale two puffs by mouth every 4 to 6 hours prn  ANTACID CALCIUM 500 MG chewable tablet,   carboxymethylcellulose PF (CARBOXYMETHYLCELLULOSE SODIUM) 0.5 % ophthalmic solution, 1 drop 3 times daily as needed for dry eyes  cetirizine (ZYRTEC) 10 MG tablet, Take 10 mg by mouth daily   cholestyramine (QUESTRAN) 4 g packet,   cyanocobalamin (VITAMIN B-12) 1000 MCG tablet, Take by mouth daily  Dapagliflozin-sAXagliptin (QTERN) 10-5 MG TABS,   DEEP SEA NASAL SPRAY 0.65 % nasal spray,   diclofenac (VOLTAREN) 1 % topical gel, APPLY 2 GRAMS TOPICALLY TO THE AFFECTED AREA(S) TWICE DAILY.  furosemide (LASIX) 20 MG tablet, Take 20 mg by mouth  gabapentin (NEURONTIN) 300 MG capsule,   glimepiride (AMARYL) 4 MG tablet, Take 1 tablet (4 mg) by mouth daily GABRIELLE. Patient gets brand  glucose (BD GLUCOSE) 4 g chewable tablet,   insulin glargine (LANTUS VIAL) 100 UNIT/ML vial, Inject 12 Units Subcutaneous every morning  insulin pen needle (BD AUTOSHIELD DUO) 30G X 5 MM, USE TO INJECT INSULIN ONCE DAILY  ipratropium - albuterol 0.5 mg/2.5 mg/3 mL (DUONEB) 0.5-2.5 (3) MG/3ML neb solution, 1 amp 3 times a day scheduled  ketoconazole (NIZORAL) 2 % external shampoo, Apply 1 mL topically  magnesium gluconate (MAGONATE) 500 (27 Mg) MG tablet, Take 1 tablet (500 mg) by mouth 2 times daily  metFORMIN (GLUCOPHAGE-XR) 500 MG 24 hr tablet, Take 1 tablet (500 mg) by mouth 2 times daily (with meals)  miconazole with  skin protectant (NALDO ANTIFUNGAL) 2 % CREA cream,   mometasone (NASONEX) 50 MCG/ACT nasal spray, Spray 2 sprays into both nostrils daily   MUCUS RELIEF 600 MG 12 hr tablet,   omeprazole (PRILOSEC) 20 MG DR capsule,   psyllium (METAMUCIL) 28.3 % POWD,   vitamin B6 (PYRIDOXINE) 100 MG tablet,   Vitamin D, Cholecalciferol, 50 MCG (2000 UT) CAPS, Take 2,000 Units by mouth daily      Review of Systems:  A Comprehensive greater than 10 system review of systems was carried out.  Pertinent positives and negatives are noted above.  Otherwise negative for contributory information.     Physical Exam:  Blood pressure 117/79, pulse 87, temperature 98.2  F (36.8  C), temperature source Oral, resp. rate 16, weight 65.8 kg (145 lb), SpO2 98 %, not currently breastfeeding.  Wt Readings from Last 1 Encounters:   07/24/22 65.8 kg (145 lb)     Exam:  General: Alert, awake, no acute distress. Elderly appearing. Unable to provide accurate history.   HEENT: NC/AT, eyes anicteric, external occular movements intact, face symmetric.   Cardiac: RRR, frequent PVCs, S1, S2.  No murmurs appreciated.  Pulmonary: Normal chest rise, normal work of breathing.  Lungs CTA BL  Abdomen: soft, non-tender, non-distended.  Bowel Sounds Present.  No guarding.  Extremities: no deformities.  Warm, well perfused.   Skin: no rashes or lesions noted.  Warm and Dry.  Right great toe with wound on the dorsal - medial aspect. Draining bloody discharge. Erythema surrounding the entire toe and extending up in to the dorsum of the foot. No crepitus.   Neuro: No focal deficits noted.  Speech clear.  Coordination and strength grossly normal.  Psych: Appropriate affect.    Data:  EKG:  None  Imaging:  Recent Results (from the past 24 hour(s))   XR Toe Right G/E 2 Views    Narrative    EXAM: XR TOE RIGHT G/E 2 VIEWS  LOCATION: [facility]  DATE/TIME: 07/24/2022, 1:19 PM    INDICATION: Swelling, redness, pain ?osteo.  COMPARISON: None.      Impression     IMPRESSION: No definite evidence of osteomyelitis. Osteopenia. Moderate degenerative changes at the IP joint of the great toe. Hammertoe deformities.       Labs:  Recent Labs   Lab 07/24/22  1204   WBC 12.6*   HGB 12.8   HCT 41.7   MCV 88        Lab Results   Component Value Date     07/24/2022     07/14/2022     06/09/2022     12/21/2015     10/31/2015     10/30/2015    Lab Results   Component Value Date    CHLORIDE 98 07/24/2022    CHLORIDE 102 07/14/2022    CHLORIDE 102 06/09/2022    CHLORIDE 104 03/24/2022    CHLORIDE 105 12/21/2015    CHLORIDE 107 10/31/2015    CHLORIDE 101 10/30/2015    Lab Results   Component Value Date    BUN 28 07/24/2022    BUN 20.5 07/14/2022    BUN 17 06/09/2022    BUN 17 03/24/2022    BUN 13 12/21/2015    BUN 18 10/31/2015    BUN 25 10/30/2015      Lab Results   Component Value Date    POTASSIUM 3.6 07/24/2022    POTASSIUM 4.8 07/14/2022    POTASSIUM 4.4 06/09/2022    POTASSIUM 4.9 03/24/2022    POTASSIUM 4.0 12/21/2015    POTASSIUM 4.6 11/01/2015    POTASSIUM 3.8 11/01/2015    Lab Results   Component Value Date    CO2 26 07/24/2022    CO2 25 07/14/2022    CO2 26 06/09/2022    CO2 24 03/24/2022    CO2 26 12/21/2015    CO2 26 10/31/2015    CO2 32 10/30/2015    Lab Results   Component Value Date    CR 0.85 07/24/2022    CR 0.78 07/14/2022    CR 0.82 06/09/2022    CR 0.64 12/21/2015    CR 0.66 10/31/2015    CR 0.89 10/30/2015        DO Abdulaziz Dominguezist  [facility]

## 2022-07-24 NOTE — ED PROVIDER NOTES
History   Chief Complaint:  Toe Pain    History provided by: the son. History limited by: the patient's dementia.      Mitali Richardson is a 89 year old female with a history of type 2 diabetes who presents with her son for toe pain. Per patient's son, he was called from her facility about her right big toe after there was an infection, which she went on antibiotics for and was being treated at the wound clinic for. However, it has since worsened, which prompted him to bring her in. The right great toe has an open wound and redness up to her ankle. Patient expresses pain with toe. Patient denies fevers or chills.    Review of Systems   Unable to perform ROS: Dementia   Constitutional: Negative.  Negative for chills and fever.   Musculoskeletal: Positive for joint swelling.   Skin: Positive for color change and wound (right big toe).     Allergies:  Amoxicillin  Penicillins  Pregabalin  Ceclor  [Cefaclor]  Clarithromycin  Codeine  Doxycycline  Floxin [Ofloxacin]  Glyburide  Metformin  Morphine  Strawberry  Sulfa Drugs  Tequin  Cephalexin  Flonase [Fluticasone]    Medications:  albuterol  Antacid calcium  carboxymethylcellulose   cetirizine  cholestyramine   cyanocobalamin  Dapagliflozin-sAXagliptin  furosemide   gabapentin   glimepiride  magnesium gluconate   metFORMIN   omeprazole   vitamin B6     Past Medical History:     Type 2 diabetes  COPD  Maxillary sinus cyst  Deviated nasal septum  Hx of antibiotic allergy  Use of rolling walker as ambulatory aid  Cholecystitis  CVA   Age-related osteoporosis without current pathological fracture  Alcohol abuse  Diabetic peripheral neuropathy  Iron deficiency anemia  Hypomagnesemia  Hyperlipidemia  GERD without esophagitis  Diverticulitis of colon  Lung nodule  Osteoarthritis  Obstructive sleep apnea  PVD   Pruritic disorder  Other diseases of lung, not elsewhere classified  Vitamin B6 deficiency  Tobacco use disorder  Sensorineural hearing loss (SNHL), bilateral  Senile  dementia without behavioral disturbance   Vitamin D deficiency    Past Surgical History:    Laparoscopic cholecystectomy with cholangiograms    Family History:    Patient denies having any family history.    Social History:  The patient presents to the ED with her son.  Patient is a former smoker.    Physical Exam     Patient Vitals for the past 24 hrs:   BP Temp Temp src Pulse Resp SpO2 Weight   07/24/22 1256 -- -- -- -- -- -- 65.8 kg (145 lb)   07/24/22 1159 117/79 98.2  F (36.8  C) Oral 87 16 98 % --     Physical Exam  General: Early female sitting upright  Eyes: PERRL, Conjunctive within normal limits  ENT: Dry oral mucous membranes, oropharynx clear.   CV: Normal S1S2, no murmur, rub or gallop. Regular rate and rhythm  Resp: Clear to auscultation bilaterally, no wheezes, rales or rhonchi. Normal respiratory effort.  GI: Abdomen is soft, nontender and nondistended. No palpable masses. No rebound or guarding.  MSK: Edema of the right lower leg from ankle into foot with an edematous right great toe.  The right great toe is tender to touch as is the dorsum of the right foot.  No palpable crepitus or bony deformity.  Able to range the bilateral lower extremities.  Skin: Warm and dry.  Erythema noted of the right great toe into the dorsum of the right foot and streaking into the right anterior ankle.  Necrosis versus broken blood vessels noted anteriorly over the medial right great toe.  No purulent discharge from the right great toe.  Neuro: Alert person.  Responds appropriately to all commands but unable to give significant history.  No focal findings appreciated.   Psych: Flat affect.        Emergency Department Course     Imaging:  XR Toe Right G/E 2 Views   Final Result   IMPRESSION: No definite evidence of osteomyelitis. Osteopenia. Moderate degenerative changes at the IP joint of the great toe. Hammertoe deformities.           Report per radiology    Laboratory:  Labs Ordered and Resulted from Time of ED  Arrival to Time of ED Departure   BASIC METABOLIC PANEL - Abnormal       Result Value    Sodium 131 (*)     Potassium 3.6      Chloride 98      Carbon Dioxide (CO2) 26      Anion Gap 7      Urea Nitrogen 28      Creatinine 0.85      Calcium 9.1      Glucose 136 (*)     GFR Estimate 64     CBC WITH PLATELETS AND DIFFERENTIAL - Abnormal    WBC Count 12.6 (*)     RBC Count 4.72      Hemoglobin 12.8      Hematocrit 41.7      MCV 88      MCH 27.1      MCHC 30.7 (*)     RDW 14.7      Platelet Count 188      % Neutrophils 83      % Lymphocytes 8      % Monocytes 8      % Eosinophils 0      % Basophils 0      % Immature Granulocytes 1      NRBCs per 100 WBC 0      Absolute Neutrophils 10.5 (*)     Absolute Lymphocytes 1.0      Absolute Monocytes 1.0      Absolute Eosinophils 0.0      Absolute Basophils 0.1      Absolute Immature Granulocytes 0.1      Absolute NRBCs 0.0     CRP INFLAMMATION - Abnormal    CRP Inflammation 75.1 (*)    ERYTHROCYTE SEDIMENTATION RATE AUTO - Normal    Erythrocyte Sedimentation Rate 26     LACTIC ACID WHOLE BLOOD - Normal    Lactic Acid 1.4     COVID-19 VIRUS (CORONAVIRUS) BY PCR - Normal    SARS CoV2 PCR Negative     BLOOD CULTURE   BLOOD CULTURE     Emergency Department Course:     Reviewed:  I reviewed nursing notes, vitals, past medical history, Care Everywhere and [facility]    Assessments:  1243 I obtained history and examined the patient as noted above.      I rechecked the patient and explained findings.  No new concerns.    Consults:  7856 I spoke with Dr. Jones of the Hospitalist service from [facility] regarding patient's presentation, findings, and plan of care.      Interventions:  Medications   vancomycin 1250 mg in 0.9% NaCl 250 mL intermittent infusion 1,250 mg (1,250 mg Intravenous New Bag 7/24/22 1349)   cefTRIAXone (ROCEPHIN) 1 g vial to attach to  mL bag for ADULTS or NS 50 mL bag for PEDS (0 g Intravenous Stopped 7/24/22 1349)   clindamycin (CLEOCIN) infusion 900 mg (900  mg Intravenous New Bag 7/24/22 4035)     Disposition:  The patient was admitted to the hospital under the care of Dr. Karen DO from Hospitalist.     Impression & Plan       Medical Decision Making:    Mitali Richardson is a 89 year old female who presents for evaluation of infected great toe with associated skin redness distant with cellulitis spreading into her right lower leg.  Osteomyelitis was considered possible but no evidence of it on x-ray.  MRI may be further helpful.  Infection of the toe may require amputation, depending on improvement with antibiotics and further assessment by podiatry/Ortho.  I do not believe there is an abscess amenable to incision and drainage.  Based on extensive spreading nature of the infection and concern for osteomyelitis, would admit to medicine for further cares and IV antibiotics.  Stable for admission.       Diagnosis:    ICD-10-CM    1. Toe infection  L08.9     great toe   2. Cellulitis of right lower extremity  L03.115      Scribe Disclosure:  I, Anshul Johnson, am serving as a scribe at 12:47 PM on 7/24/2022 to document services personally performed by Lu Brennan MD based on my observations and the provider's statements to me.    Scribe Disclosure:  I, Deidre Sanchez, am serving as a scribe at 2:23 PM on 7/24/2022 to document services personally performed by Lu Brennan M based on my observations and the provider's statements to me.       Lu Brennan MD  07/24/22 7501

## 2022-07-24 NOTE — ED TRIAGE NOTES
Pt c/o right great toe with wound and redness up her ankle.     Triage Assessment     Row Name 07/24/22 1150       Triage Assessment (Adult)    Airway WDL WDL       Respiratory WDL    Respiratory WDL WDL

## 2022-07-25 ENCOUNTER — APPOINTMENT (OUTPATIENT)
Dept: ULTRASOUND IMAGING | Facility: CLINIC | Age: 87
DRG: 637 | End: 2022-07-25
Attending: SURGERY
Payer: COMMERCIAL

## 2022-07-25 ENCOUNTER — APPOINTMENT (OUTPATIENT)
Dept: ULTRASOUND IMAGING | Facility: CLINIC | Age: 87
DRG: 637 | End: 2022-07-25
Attending: PODIATRIST
Payer: COMMERCIAL

## 2022-07-25 LAB
ANION GAP SERPL CALCULATED.3IONS-SCNC: 7 MMOL/L (ref 3–14)
BUN SERPL-MCNC: 20 MG/DL (ref 7–30)
CALCIUM SERPL-MCNC: 8.1 MG/DL (ref 8.5–10.1)
CHLORIDE BLD-SCNC: 101 MMOL/L (ref 94–109)
CO2 SERPL-SCNC: 25 MMOL/L (ref 20–32)
CREAT SERPL-MCNC: 0.58 MG/DL (ref 0.52–1.04)
CRP SERPL-MCNC: 44.4 MG/L (ref 0–8)
ENTEROCOCCUS FAECALIS: NOT DETECTED
ENTEROCOCCUS FAECIUM: NOT DETECTED
ERYTHROCYTE [DISTWIDTH] IN BLOOD BY AUTOMATED COUNT: 14.9 % (ref 10–15)
GFR SERPL CREATININE-BSD FRML MDRD: 85 ML/MIN/1.73M2
GLUCOSE BLD-MCNC: 116 MG/DL (ref 70–99)
GLUCOSE BLDC GLUCOMTR-MCNC: 113 MG/DL (ref 70–99)
GLUCOSE BLDC GLUCOMTR-MCNC: 125 MG/DL (ref 70–99)
GLUCOSE BLDC GLUCOMTR-MCNC: 137 MG/DL (ref 70–99)
GLUCOSE BLDC GLUCOMTR-MCNC: 236 MG/DL (ref 70–99)
GLUCOSE BLDC GLUCOMTR-MCNC: 91 MG/DL (ref 70–99)
HCT VFR BLD AUTO: 37.4 % (ref 35–47)
HGB BLD-MCNC: 11.3 G/DL (ref 11.7–15.7)
HOLD SPECIMEN: NORMAL
HOLD SPECIMEN: NORMAL
LISTERIA SPECIES (DETECTED/NOT DETECTED): NOT DETECTED
MCH RBC QN AUTO: 26.3 PG (ref 26.5–33)
MCHC RBC AUTO-ENTMCNC: 30.2 G/DL (ref 31.5–36.5)
MCV RBC AUTO: 87 FL (ref 78–100)
PHOSPHATE SERPL-MCNC: 3.3 MG/DL (ref 2.5–4.5)
PLATELET # BLD AUTO: 172 10E3/UL (ref 150–450)
POTASSIUM BLD-SCNC: 3.2 MMOL/L (ref 3.4–5.3)
POTASSIUM BLD-SCNC: 3.9 MMOL/L (ref 3.4–5.3)
RBC # BLD AUTO: 4.3 10E6/UL (ref 3.8–5.2)
SODIUM SERPL-SCNC: 133 MMOL/L (ref 133–144)
STAPHYLOCOCCUS AUREUS: NOT DETECTED
STAPHYLOCOCCUS EPIDERMIDIS: DETECTED
STAPHYLOCOCCUS LUGDUNENSIS: NOT DETECTED
STREPTOCOCCUS AGALACTIAE: NOT DETECTED
STREPTOCOCCUS ANGINOSUS GROUP: NOT DETECTED
STREPTOCOCCUS PNEUMONIAE: NOT DETECTED
STREPTOCOCCUS PYOGENES: NOT DETECTED
STREPTOCOCCUS SPECIES: NOT DETECTED
WBC # BLD AUTO: 9.1 10E3/UL (ref 4–11)

## 2022-07-25 PROCEDURE — 99232 SBSQ HOSP IP/OBS MODERATE 35: CPT | Performed by: INTERNAL MEDICINE

## 2022-07-25 PROCEDURE — 82310 ASSAY OF CALCIUM: CPT | Performed by: STUDENT IN AN ORGANIZED HEALTH CARE EDUCATION/TRAINING PROGRAM

## 2022-07-25 PROCEDURE — 84100 ASSAY OF PHOSPHORUS: CPT | Performed by: INTERNAL MEDICINE

## 2022-07-25 PROCEDURE — 93922 UPR/L XTREMITY ART 2 LEVELS: CPT

## 2022-07-25 PROCEDURE — 250N000012 HC RX MED GY IP 250 OP 636 PS 637: Performed by: STUDENT IN AN ORGANIZED HEALTH CARE EDUCATION/TRAINING PROGRAM

## 2022-07-25 PROCEDURE — 258N000003 HC RX IP 258 OP 636: Performed by: STUDENT IN AN ORGANIZED HEALTH CARE EDUCATION/TRAINING PROGRAM

## 2022-07-25 PROCEDURE — 250N000009 HC RX 250: Performed by: STUDENT IN AN ORGANIZED HEALTH CARE EDUCATION/TRAINING PROGRAM

## 2022-07-25 PROCEDURE — 250N000011 HC RX IP 250 OP 636: Performed by: STUDENT IN AN ORGANIZED HEALTH CARE EDUCATION/TRAINING PROGRAM

## 2022-07-25 PROCEDURE — 36415 COLL VENOUS BLD VENIPUNCTURE: CPT | Performed by: INTERNAL MEDICINE

## 2022-07-25 PROCEDURE — 84132 ASSAY OF SERUM POTASSIUM: CPT | Performed by: INTERNAL MEDICINE

## 2022-07-25 PROCEDURE — 85014 HEMATOCRIT: CPT | Performed by: STUDENT IN AN ORGANIZED HEALTH CARE EDUCATION/TRAINING PROGRAM

## 2022-07-25 PROCEDURE — 250N000013 HC RX MED GY IP 250 OP 250 PS 637: Performed by: INTERNAL MEDICINE

## 2022-07-25 PROCEDURE — 250N000013 HC RX MED GY IP 250 OP 250 PS 637: Performed by: STUDENT IN AN ORGANIZED HEALTH CARE EDUCATION/TRAINING PROGRAM

## 2022-07-25 PROCEDURE — 36415 COLL VENOUS BLD VENIPUNCTURE: CPT | Performed by: STUDENT IN AN ORGANIZED HEALTH CARE EDUCATION/TRAINING PROGRAM

## 2022-07-25 PROCEDURE — 93926 LOWER EXTREMITY STUDY: CPT | Mod: RT

## 2022-07-25 PROCEDURE — 86140 C-REACTIVE PROTEIN: CPT | Performed by: STUDENT IN AN ORGANIZED HEALTH CARE EDUCATION/TRAINING PROGRAM

## 2022-07-25 PROCEDURE — 120N000001 HC R&B MED SURG/OB

## 2022-07-25 PROCEDURE — 87040 BLOOD CULTURE FOR BACTERIA: CPT | Performed by: INTERNAL MEDICINE

## 2022-07-25 PROCEDURE — 99232 SBSQ HOSP IP/OBS MODERATE 35: CPT | Mod: GC | Performed by: PODIATRIST

## 2022-07-25 RX ORDER — POTASSIUM CHLORIDE 1500 MG/1
40 TABLET, EXTENDED RELEASE ORAL ONCE
Status: COMPLETED | OUTPATIENT
Start: 2022-07-25 | End: 2022-07-25

## 2022-07-25 RX ORDER — GADOBUTROL 604.72 MG/ML
7.5 INJECTION INTRAVENOUS ONCE
Status: DISCONTINUED | OUTPATIENT
Start: 2022-07-25 | End: 2022-07-25 | Stop reason: CLARIF

## 2022-07-25 RX ADMIN — CLINDAMYCIN PHOSPHATE 900 MG: 900 INJECTION, SOLUTION INTRAVENOUS at 13:18

## 2022-07-25 RX ADMIN — CEFTRIAXONE 1 G: 1 INJECTION, POWDER, FOR SOLUTION INTRAMUSCULAR; INTRAVENOUS at 14:13

## 2022-07-25 RX ADMIN — ACETAMINOPHEN 650 MG: 325 TABLET, FILM COATED ORAL at 21:51

## 2022-07-25 RX ADMIN — ESCITALOPRAM OXALATE 10 MG: 10 TABLET ORAL at 09:37

## 2022-07-25 RX ADMIN — CLINDAMYCIN PHOSPHATE 900 MG: 900 INJECTION, SOLUTION INTRAVENOUS at 21:37

## 2022-07-25 RX ADMIN — CLINDAMYCIN PHOSPHATE 900 MG: 900 INJECTION, SOLUTION INTRAVENOUS at 03:20

## 2022-07-25 RX ADMIN — SODIUM PHOSPHATE, MONOBASIC, MONOHYDRATE 9 MMOL: 276; 142 INJECTION, SOLUTION INTRAVENOUS at 04:45

## 2022-07-25 RX ADMIN — MICONAZOLE NITRATE: 2 POWDER TOPICAL at 21:52

## 2022-07-25 RX ADMIN — MICONAZOLE NITRATE: 2 POWDER TOPICAL at 09:43

## 2022-07-25 RX ADMIN — VANCOMYCIN HYDROCHLORIDE 1000 MG: 1 INJECTION, SOLUTION INTRAVENOUS at 15:04

## 2022-07-25 RX ADMIN — PANTOPRAZOLE SODIUM 20 MG: 20 TABLET, DELAYED RELEASE ORAL at 09:38

## 2022-07-25 RX ADMIN — Medication 2000 UNITS: at 09:37

## 2022-07-25 RX ADMIN — POTASSIUM CHLORIDE 40 MEQ: 1500 TABLET, EXTENDED RELEASE ORAL at 09:38

## 2022-07-25 RX ADMIN — ACETAMINOPHEN 650 MG: 325 TABLET, FILM COATED ORAL at 12:05

## 2022-07-25 RX ADMIN — INSULIN GLARGINE 5 UNITS: 100 INJECTION, SOLUTION SUBCUTANEOUS at 10:19

## 2022-07-25 ASSESSMENT — ACTIVITIES OF DAILY LIVING (ADL)
ADLS_ACUITY_SCORE: 53
ADLS_ACUITY_SCORE: 53
ADLS_ACUITY_SCORE: 37
ADLS_ACUITY_SCORE: 37
ADLS_ACUITY_SCORE: 53
ADLS_ACUITY_SCORE: 51
ADLS_ACUITY_SCORE: 37
ADLS_ACUITY_SCORE: 37
ADLS_ACUITY_SCORE: 41
ADLS_ACUITY_SCORE: 51
ADLS_ACUITY_SCORE: 53
ADLS_ACUITY_SCORE: 51

## 2022-07-25 NOTE — PROVIDER NOTIFICATION
DATE:  7/25/2022   TIME OF RECEIPT FROM LAB:  1213   LAB TEST:  Positive blood cultures  LAB VALUE:  Gram + cocci in clusters  RESULTS GIVEN WITH READ-BACK TO (PROVIDER):  Dr. Abdalla    TIME LAB VALUE REPORTED TO PROVIDER:   1224    Addendum 1233: No changes as pt is already on Vanco per Dr. Abdalla.

## 2022-07-25 NOTE — CONSULTS
[facility]  CONSULTATION NOTE    CHIEF COMPLAINT:      I was asked by Lev Jones to evaluate this patient for right foot.    PATIENT HISTORY:  Mitali Richardson is a 89 year old female  with a past medical history significant for what's listed below. She presented from an assisted living facility for right toe pain. She is not able to provide significant history. She does state the site is painful. She does not know how long it's been there. She asks for further discussion to be had with her son. She denies N/F/V/C/D. She does not answer whether she walks much.   -Phone call placed to patient's son who states she doesn't walk significantly due to pain.         Review of Systems:  A 10 point review of systems was performed and is positive for that noted above in the patient history.  All other areas are negative.     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 Current.     ALLERGIES:    Allergies   Allergen Reactions     Amoxicillin Anaphylaxis and Hives     Penicillins Anaphylaxis and Hives     Pregabalin Other (See Comments)     Other reaction(s): Sedation       Ceclor  [Cefaclor]      Clarithromycin      Codeine      Other reaction(s): Chest Pain     Doxycycline Hives     Floxin [Ofloxacin]      Has tolerated cipro in the past     Glyburide      Patient doesn't know what allergy or reaction she's had. Might potentially be due to sulfa allergy.     Metformin Other (See Comments)     Other reaction(s): lactic acidosis     Morphine Nausea and Vomiting      Strawberry Hives     Sulfa Drugs Unknown     Shakiness/sweating     Tequin      Other reaction(s): Tremors     Cephalexin Rash     Tolerated ceftriaxone 7/24/22     Flonase [Fluticasone]      Bloody nose        SOCIAL HISTORY:   Social History     Socioeconomic History     Marital status:      Spouse name: Not on file     Number of children: Not on file     Years of education: Not on file     Highest education level: Not on file   Occupational History     Not on file   Tobacco Use     Smoking status: Former Smoker     Types: Cigarettes     Smokeless tobacco: Never Used   Substance and Sexual Activity     Alcohol use: No     Drug use: No     Sexual activity: Never   Other Topics Concern     Parent/sibling w/ CABG, MI or angioplasty before 65F 55M? Not Asked   Social History Narrative     Not on file     Social Determinants of Health     Financial Resource Strain: Not on file   Food Insecurity: Not on file   Transportation Needs: Not on file   Physical Activity: Not on file   Stress: Not on file   Social Connections: Not on file   Intimate Partner Violence: Not on file   Housing Stability: Not on file        FAMILY HISTORY: No family history on file.     EXAM:Vitals: /65 (BP Location: Left arm)   Pulse 68   Temp 98.8  F (37.1  C) (Temporal)   Resp 18   Wt 65.8 kg (145 lb)   SpO2 95%   BMI 29.29 kg/m    BMI= Body mass index is 29.29 kg/m .    LABS:   Hba1c: 8.9    Last Comprehensive Metabolic Panel:  Sodium   Date Value Ref Range Status   07/25/2022 133 133 - 144 mmol/L Final   12/21/2015 139 133 - 144 mmol/L Final     Potassium   Date Value Ref Range Status   07/25/2022 3.2 (L) 3.4 - 5.3 mmol/L Final   12/21/2015 4.0 3.4 - 5.3 mmol/L Final     Chloride   Date Value Ref Range Status   07/25/2022 101 94 - 109 mmol/L Final   12/21/2015 105 94 - 109 mmol/L Final     Carbon Dioxide   Date Value Ref Range Status   12/21/2015 26 20 - 32 mmol/L Final     Carbon Dioxide (CO2)   Date Value Ref Range Status    07/25/2022 25 20 - 32 mmol/L Final     Anion Gap   Date Value Ref Range Status   07/25/2022 7 3 - 14 mmol/L Final   12/21/2015 8 3 - 14 mmol/L Final     Glucose   Date Value Ref Range Status   07/25/2022 116 (H) 70 - 99 mg/dL Final   12/21/2015 216 (H) 70 - 99 mg/dL Final     GLUCOSE BY METER POCT   Date Value Ref Range Status   07/25/2022 125 (H) 70 - 99 mg/dL Final     Urea Nitrogen   Date Value Ref Range Status   07/25/2022 20 7 - 30 mg/dL Final   12/21/2015 13 7 - 30 mg/dL Final     Creatinine   Date Value Ref Range Status   07/25/2022 0.58 0.52 - 1.04 mg/dL Final   12/21/2015 0.64 0.52 - 1.04 mg/dL Final     GFR Estimate   Date Value Ref Range Status   07/25/2022 85 >60 mL/min/1.73m2 Final     Comment:     Effective December 21, 2021 eGFRcr in adults is calculated using the 2021 CKD-EPI creatinine equation which includes age and gender (José et al., NEJ, DOI: 10.1056/WXQXcf7412698)   12/21/2015 88 >60 mL/min/1.7m2 Final     Comment:     Non  GFR Calc   12/21/2015 80 >60 mL/min/1.7m2 Final     Calcium   Date Value Ref Range Status   07/25/2022 8.1 (L) 8.5 - 10.1 mg/dL Final   12/21/2015 8.7 8.5 - 10.1 mg/dL Final     Lab Results   Component Value Date    WBC 9.1 07/25/2022    WBC 6.2 12/21/2015     Lab Results   Component Value Date    RBC 4.30 07/25/2022    RBC 4.50 12/21/2015     Lab Results   Component Value Date    HGB 11.3 07/25/2022    HGB 13.0 12/21/2015     Lab Results   Component Value Date    HCT 37.4 07/25/2022    HCT 39.2 12/21/2015     Lab Results   Component Value Date     07/25/2022     12/21/2015      No results found for: INR     General appearance: Patient is alert and fully cooperative with history & exam.  No sign of distress is noted during the visit.      Respiratory: Breathing is regular & unlabored while sitting.     HEENT: Hearing is intact to spoken word.  Speech is clear.  No gross evidence of visual impairment that would impact ambulation.       Dermatologic: Right hallux with dorsal eschars and distal devascularization. Dorsal tissue tears. No probe to deep tissue. No purulence. Violaceous color. Cool to touch. Cellulitis noted to dorsal foot. Likely also exacerbated by poor perfusion.       Vascular: Dorsalis pedis and posterior tibial pulses are difficult to palpate & regular bilaterally.  CFT and skin temperature is normal to both lower extremities.       Neurologic: Lower extremity sensation is intact, bilateral foot, to light touch.  No evidence of neurological-based weakness or contracture in the lower extremities.       Musculoskeletal: Patient is minimally ambulatory without an assistive device or brace.      Psychiatric: Affect is pleasant & appropriate.      All cultures:  Recent Labs   Lab 07/24/22  1313 07/24/22  1259   CULTURE No growth after 12 hours No growth after 12 hours        IMAGING:     IMPRESSION: No definite evidence of osteomyelitis. Osteopenia. Moderate degenerative changes at the IP joint of the great toe. Hammertoe deformities.    ASSESSMENT:  1. Right hallux eschars/skin tears, with cellulitis and devascularized changes   2. Uncontrolled Diabetes -->8.9     MEDICAL DECISION MAKING:   -Discussed all findings with patient and her son. Ulcer sites themselves don't show signs of osteomyelitis, given superficial nature. WBC and CRP are trending down with abx alone. Order for MRI can be cancelled for now. Would not consider surgery without improvement of perfusion   -Did place order for ABIs and for vascular consult. Patient may need to be transferred to [facility] for intervention. Clinically, appears to be at risk for worsening gangrene.   -Patient's son's updated on findings so far.   -Cont IV abx  -WBAT   -Will follow       Thank you for the consultation request and the opportunity to participate in the care of Mitali Smith DPM   [facility] Ankle  Surgery  [phone]    Greater than 45 minutes were spent today, reviewing previous hospital records, counseling and educating the patient on the ongoing treatment plan, calling patient's family and coordinating care.

## 2022-07-25 NOTE — PLAN OF CARE
Pt is alert to self, anxious, agitated at times. Was taking off tele stickers, pulled IV x1. MRI was not done because pt was not able to stay still. NPO since midnight. Refused CPAP. VSS on RA. Pt did not sleep all night, was talking to herself. Pure wick used with good output. Cleocin given per order. , 89. Pt is not cooperative with BG checks and vitals needs a lot of encouragement. On Potassium and Phosphorus protocol, Phosphorus replacing via IV. Nursing continue to monitor.

## 2022-07-25 NOTE — PROGRESS NOTES
[facility]    Hospitalist Progress Note  Name: Mitali Richardson    MRN: [id number]  Provider:  Moises Abdalla DO  Date of Service: 07/25/2022    Summary of Stay: Mitali Richardson is a 89 year old female with a history of type 2 diabetes mellitus, COPD, dementia admitted on 7/24/2022 with toe pain.  Patient is a poor historian so much of the history was obtained through the emergency department provider as well as the son at bedside.  In the emergency department, the patient was found to have a blood pressure of 117/79, heart rate 87, temperature 98.2  F, respiratory rate 16, SPO2 98% on room air.  Initial lab work showed mild leukocytosis of 12.6.  X-ray of the right great toe showed no definite evidence of osteomyelitis, moderate degenerative changes noted.  The patient was started on vancomycin, clindamycin, and IV ceftriaxone.  Podiatry was consulted to see the patient.  Podiatry recommended against surgical intervention at this point, however did recommend vascular surgery evaluation and ABIs.    TODAY'S PLAN:  Appreciate Podiatry recommendations.  Continue Vanco/Clindamycin/Ceftriaxone for now.  1/2 blood cultures positive for gram + cocci in clusters.  This was discussed with the patient's son.  Podiatry recommending ABIs and Vascular Surgery evaluation.  Son Juan Ramon updated via phone.  All questions answered.    Problem List:   Toe pain  Right toe cellulitis:  3 days of right great toe wound now with erythema that is tracking up into the foot along with increased pain, and warmth.  Wound likely related to type 2 diabetes, related polyneuropathy.  Overall concern for diabetic ulcer that has now progressed to right great toe and right foot cellulitis.  Initiated on clinda, vancomycin, ceftriaxone in the emergency department.  -Podiatry consultation  -Continue vanc/ceftri/clinda (given abx allergies)  - ABDELRAHMAN pending  - Appreciate Vascular Surgery recommendations  -Tylenol for pain    1/2 Blood  Cultures Positive for Staph Epidermidis  - Likely contaminant  - Repeat blood culture pending     T2DM:  Appears to take Qtern, Amaryl, Lantus 12 units every morning at baseline.  -Lantus 5 units every morning for now  -MDSSI     COPD: No evidence of exacerbation.  Continue PTA inhalers once med rec complete     Dementia: Unreliable historian.  Delirium precautions while here    DVT Prophylaxis: Pneumatic Compression Devices  Code Status: Full Code  Diet: Moderate Consistent Carb (60 g CHO per Meal) Diet    Zhang Catheter: Not present  Disposition: Expected discharge in 2-3 days to home with home care vs TCU. Goals prior to discharge include abx plan established, vascular sx evaluation complete.   Family updated today: Yes      Interval History   Pt seen and examined.  Pt reports some pain in her left great toe.  Knows she is in the hospital but unsure what city or what year it is.      -Data reviewed today: I personally reviewed all new labs and imaging results over the last 24 hours.     Physical Exam   Temp: 98.4  F (36.9  C) Temp src: Temporal BP: (!) 153/55 Pulse: 72   Resp: 24 SpO2: 96 % O2 Device: None (Room air)    Vitals:    07/24/22 1256   Weight: 65.8 kg (145 lb)     Vital Signs with Ranges  Temp:  [97.8  F (36.6  C)-99.6  F (37.6  C)] 98.4  F (36.9  C)  Pulse:  [68-85] 72  Resp:  [16-24] 24  BP: (107-153)/(52-75) 153/55  SpO2:  [91 %-100 %] 96 %  I/O last 3 completed shifts:  In: 160 [P.O.:160]  Out: 900 [Urine:900]    GENERAL: No apparent distress. Awake, alert  HEENT: Normocephalic, atraumatic. Extraocular movements intact.  CARDIOVASCULAR: Regular rate and rhythm without murmurs or rubs. No S3.  PULMONARY: Clear bilaterally.  GASTROINTESTINAL: Soft, non-tender, non-distended. Bowel sounds normoactive.   EXTREMITIES: Erythematous and swollen left great toe  NEUROLOGICAL: CN 2-12 grossly intact, no focal neurological deficits.  DERMATOLOGICAL: No rash, ulcer, bruising, nor jaundice.    Medications      lactated ringers 100 mL/hr at 07/24/22 1538       cefTRIAXone  1 g Intravenous Q24H     clindamycin  900 mg Intravenous Q8H     escitalopram  10 mg Oral Daily     insulin aspart  1-6 Units Subcutaneous Q4H     insulin glargine  5 Units Subcutaneous QAM AC     miconazole   Topical BID     pantoprazole  20 mg Oral Daily     sodium chloride (PF)  3 mL Intracatheter Q8H     vancomycin  1,000 mg Intravenous Q24H     Vitamin D3  2,000 Units Oral Daily     Data     Laboratory:  Recent Labs   Lab 07/25/22  0632 07/24/22  1204   WBC 9.1 12.6*   HGB 11.3* 12.8   HCT 37.4 41.7   MCV 87 88    188     Recent Labs   Lab 07/25/22  1306 07/25/22  1210 07/25/22  0922 07/25/22  0632 07/24/22  1746 07/24/22  1204   NA  --   --   --  133  --  131*   POTASSIUM 3.9  --   --  3.2*  --  3.6   CHLORIDE  --   --   --  101  --  98   CO2  --   --   --  25  --  26   ANIONGAP  --   --   --  7  --  7   GLC  --  113* 125* 116*   < > 136*   BUN  --   --   --  20  --  28   CR  --   --   --  0.58  --  0.85   GFRESTIMATED  --   --   --  85  --  64   ALONSO  --   --   --  8.1*  --  9.1    < > = values in this interval not displayed.     No results for input(s): CULT in the last 168 hours.    Imaging:  No results found for this or any previous visit (from the past 24 hour(s)).      Moises Abdalla DO  [facility] Hospitalist  [address] [place] [address]  07/25/2022

## 2022-07-25 NOTE — PLAN OF CARE
Goal Outcome Evaluation:    Plan of Care Reviewed With: patient, family, son               VS-down to ultra sound of leg at 1030 via cart. Family here. Stable, afebrile, on remote tele. K was 3.2 orally replaced. phos was replaced at end of night shift. Pt pulled out her iv and stopped iv.   Lung Sounds-clear, no cough, on room air.   O2-on room air.   GI-+Bs, +flatus, lbm is unknown. Tolerating mod cho diet. Denies nausea.   -voiding via bsc. Pivot to bsc.   IVF-iv pulled out . Need new iv via vascular. On rocehpin, cleocin, and vanco all iv.   Dressings-cellulitits open to air. Socks on pt when up.   CMS-denies numbness and tingling, +pp weak, red outline on foot. Swelling on foot and toe. Warm. Red. Black spot on R greater toe.   Drain-none.   Activity-up with 2 assist, pivot, gb.   Pain-rates pain a 2. On tylenol .   D/C Plan-comes from [facility]. Refuses some cares--cpap.   Phos bag not totally in pt, rechecked phos level 3.3. reordered recheck for am, K replaced and recheck was 3.9. ordered for am recheck

## 2022-07-25 NOTE — PROGRESS NOTES
Virtual Vascular Surgery    Consult noted and ABDELRAHMAN reviewed along with picture in chart. Will order arterial duplex and plan to see in am virtual. Plain X-rays and normal WBC reassuring.

## 2022-07-25 NOTE — PROGRESS NOTES
RT-Pt refused CPAP. RN at bedside. RT will continue to follow as needed.    Kyree Paige, RT on 7/24/2022 at 8:21 PM

## 2022-07-26 ENCOUNTER — DOCUMENTATION ONLY (OUTPATIENT)
Dept: OTHER | Facility: CLINIC | Age: 87
End: 2022-07-26

## 2022-07-26 LAB
ANION GAP SERPL CALCULATED.3IONS-SCNC: 7 MMOL/L (ref 3–14)
BUN SERPL-MCNC: 13 MG/DL (ref 7–30)
CALCIUM SERPL-MCNC: 8.8 MG/DL (ref 8.5–10.1)
CHLORIDE BLD-SCNC: 102 MMOL/L (ref 94–109)
CO2 SERPL-SCNC: 23 MMOL/L (ref 20–32)
CREAT SERPL-MCNC: 0.57 MG/DL (ref 0.52–1.04)
ERYTHROCYTE [DISTWIDTH] IN BLOOD BY AUTOMATED COUNT: 14.6 % (ref 10–15)
GFR SERPL CREATININE-BSD FRML MDRD: 85 ML/MIN/1.73M2
GLUCOSE BLD-MCNC: 170 MG/DL (ref 70–99)
GLUCOSE BLDC GLUCOMTR-MCNC: 180 MG/DL (ref 70–99)
GLUCOSE BLDC GLUCOMTR-MCNC: 191 MG/DL (ref 70–99)
GLUCOSE BLDC GLUCOMTR-MCNC: 197 MG/DL (ref 70–99)
GLUCOSE BLDC GLUCOMTR-MCNC: 274 MG/DL (ref 70–99)
GLUCOSE BLDC GLUCOMTR-MCNC: 303 MG/DL (ref 70–99)
HCT VFR BLD AUTO: 40.9 % (ref 35–47)
HGB BLD-MCNC: 12.4 G/DL (ref 11.7–15.7)
MCH RBC QN AUTO: 26.4 PG (ref 26.5–33)
MCHC RBC AUTO-ENTMCNC: 30.3 G/DL (ref 31.5–36.5)
MCV RBC AUTO: 87 FL (ref 78–100)
MRSA DNA SPEC QL NAA+PROBE: NEGATIVE
PHOSPHATE SERPL-MCNC: 3.2 MG/DL (ref 2.5–4.5)
PLATELET # BLD AUTO: 185 10E3/UL (ref 150–450)
POTASSIUM BLD-SCNC: 3.8 MMOL/L (ref 3.4–5.3)
RBC # BLD AUTO: 4.7 10E6/UL (ref 3.8–5.2)
SA TARGET DNA: NEGATIVE
SODIUM SERPL-SCNC: 132 MMOL/L (ref 133–144)
VANCOMYCIN SERPL-MCNC: 7.2 UG/ML
WBC # BLD AUTO: 9.8 10E3/UL (ref 4–11)

## 2022-07-26 PROCEDURE — 80202 ASSAY OF VANCOMYCIN: CPT

## 2022-07-26 PROCEDURE — 99232 SBSQ HOSP IP/OBS MODERATE 35: CPT | Mod: 25 | Performed by: INTERNAL MEDICINE

## 2022-07-26 PROCEDURE — 250N000011 HC RX IP 250 OP 636

## 2022-07-26 PROCEDURE — 85027 COMPLETE CBC AUTOMATED: CPT | Performed by: INTERNAL MEDICINE

## 2022-07-26 PROCEDURE — 80048 BASIC METABOLIC PNL TOTAL CA: CPT | Performed by: INTERNAL MEDICINE

## 2022-07-26 PROCEDURE — 250N000013 HC RX MED GY IP 250 OP 250 PS 637: Performed by: STUDENT IN AN ORGANIZED HEALTH CARE EDUCATION/TRAINING PROGRAM

## 2022-07-26 PROCEDURE — 84100 ASSAY OF PHOSPHORUS: CPT | Performed by: STUDENT IN AN ORGANIZED HEALTH CARE EDUCATION/TRAINING PROGRAM

## 2022-07-26 PROCEDURE — 36415 COLL VENOUS BLD VENIPUNCTURE: CPT

## 2022-07-26 PROCEDURE — 99356 PR PROLONGED SERV,INPATIENT,1ST HR: CPT | Performed by: CLINICAL NURSE SPECIALIST

## 2022-07-26 PROCEDURE — 120N000001 HC R&B MED SURG/OB

## 2022-07-26 PROCEDURE — 99207 PR NON-BILLABLE SERV PER CHARTING: CPT | Performed by: SURGERY

## 2022-07-26 PROCEDURE — 87641 MR-STAPH DNA AMP PROBE: CPT | Performed by: INTERNAL MEDICINE

## 2022-07-26 PROCEDURE — 36415 COLL VENOUS BLD VENIPUNCTURE: CPT | Performed by: INTERNAL MEDICINE

## 2022-07-26 PROCEDURE — 99232 SBSQ HOSP IP/OBS MODERATE 35: CPT | Mod: GC | Performed by: PODIATRIST

## 2022-07-26 PROCEDURE — 99223 1ST HOSP IP/OBS HIGH 75: CPT | Performed by: CLINICAL NURSE SPECIALIST

## 2022-07-26 PROCEDURE — 99357 PR PROLONGED SERV,INPATIENT,EA ADDL 30 MIN: CPT | Performed by: CLINICAL NURSE SPECIALIST

## 2022-07-26 PROCEDURE — 250N000011 HC RX IP 250 OP 636: Performed by: STUDENT IN AN ORGANIZED HEALTH CARE EDUCATION/TRAINING PROGRAM

## 2022-07-26 RX ORDER — VANCOMYCIN HYDROCHLORIDE 1 G/200ML
1000 INJECTION, SOLUTION INTRAVENOUS ONCE
Status: COMPLETED | OUTPATIENT
Start: 2022-07-26 | End: 2022-07-26

## 2022-07-26 RX ADMIN — PANTOPRAZOLE SODIUM 20 MG: 20 TABLET, DELAYED RELEASE ORAL at 09:17

## 2022-07-26 RX ADMIN — VANCOMYCIN HYDROCHLORIDE 1000 MG: 1 INJECTION, SOLUTION INTRAVENOUS at 14:55

## 2022-07-26 RX ADMIN — Medication 2000 UNITS: at 09:16

## 2022-07-26 RX ADMIN — MICONAZOLE NITRATE: 2 POWDER TOPICAL at 19:04

## 2022-07-26 RX ADMIN — ACETAMINOPHEN 650 MG: 325 TABLET, FILM COATED ORAL at 17:19

## 2022-07-26 RX ADMIN — ESCITALOPRAM OXALATE 10 MG: 10 TABLET ORAL at 09:16

## 2022-07-26 RX ADMIN — CLINDAMYCIN PHOSPHATE 900 MG: 900 INJECTION, SOLUTION INTRAVENOUS at 20:40

## 2022-07-26 RX ADMIN — CLINDAMYCIN PHOSPHATE 900 MG: 900 INJECTION, SOLUTION INTRAVENOUS at 04:36

## 2022-07-26 RX ADMIN — CLINDAMYCIN PHOSPHATE 900 MG: 900 INJECTION, SOLUTION INTRAVENOUS at 13:48

## 2022-07-26 RX ADMIN — INSULIN GLARGINE 5 UNITS: 100 INJECTION, SOLUTION SUBCUTANEOUS at 09:20

## 2022-07-26 RX ADMIN — CEFTRIAXONE 1 G: 1 INJECTION, POWDER, FOR SOLUTION INTRAMUSCULAR; INTRAVENOUS at 11:59

## 2022-07-26 RX ADMIN — MICONAZOLE NITRATE: 2 POWDER TOPICAL at 09:24

## 2022-07-26 ASSESSMENT — ACTIVITIES OF DAILY LIVING (ADL)
ADLS_ACUITY_SCORE: 51
ADLS_ACUITY_SCORE: 51
ADLS_ACUITY_SCORE: 47
ADLS_ACUITY_SCORE: 47
ADLS_ACUITY_SCORE: 51
ADLS_ACUITY_SCORE: 47
ADLS_ACUITY_SCORE: 51
ADLS_ACUITY_SCORE: 47
ADLS_ACUITY_SCORE: 51
ADLS_ACUITY_SCORE: 51

## 2022-07-26 NOTE — CONSULTS
[facility]    Virtual Vascular Surgery Consultation     Date of Admission:  7/24/2022    Assessment & Plan   Mitali Richardson is a 89 year old female for smoker with h/o PAD who was admitted on 7/24/2022. I was asked to see the patient for duskiness of right great toe with forefoot redness possibly secondary to trauma given superficial skin tear, ecchymosis and petechiae in forefoot. She has ABIs and and arterial duplex suggestive of aortoiliac occlusive disease along with tibial level occlusive disease that are not adequate for healing in this 89 y/o female with right forefoot ischemia. She is not a candidate for revascularization - percutaneously or open - at this time given her dementia, and thus would not advise CTA. Would suggest a Palliative Care consult to optimize and best determine care plan and comfort going forward. Aspirin 81mg and statin (Lipitor 40mg)  therapy recommended along with antibiotic therapy. Please reach out if plans change and keep right foot protected with a Kerlix gauze wrapy covering toes right and heal.    Kathy Hinton MD, DFSVS, RPVI  Director, [facility]  Chief, Vascular and Endovascular Surgery  [facility]  [email or web address]  Pager: 1. Send message or 10 digit call back number Securely via [employer] with the [employer] Web Console (learn more here)              2. Outside of [facility]? Call [phone]      Visit/Communication Style   I performed a CHART REVIEW, but did not directly evaluate the patient.    Time spent on review: 60 minutes         Kathy Hinton MD    Code Status    Full Code    Reason for Consult   Reason for consult: I was asked by Dr Smith to evaluate this patient for duskiness of right forefoot and ecchymotic right great toe.    Primary Care Physician   Dr Sherrie Briceno    Chief Complaint   Right great toe discoloration and pain    History is obtained from the patient and electronic health record and  chart given her dementia    History of Present Illness   Mitali Richardson is a 89 year old female who presents with painful erythematous right great toe for three days. She lives in assisted-living and has some baseline dementia and poor historian. Has been comabtive with some care while hospitalized and attempts at wound care on right foot. She is a former smoker with h/o diabetes, PAD, COPD, senile dementia.     Past Medical History   I have reviewed this patient's medical history and updated it with pertinent information if needed.   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  Past Surgical History:   Procedure Laterality Date     LAPAROSCOPIC CHOLECYSTECTOMY WITH CHOLANGIOGRAMS N/A 10/31/2015    Procedure: LAPAROSCOPIC CHOLECYSTECTOMY WITH CHOLANGIOGRAMS;  Surgeon: Kathleen Clark MD;  Location: [facility] OR       Prior to Admission Medications   Prior to Admission Medications   Prescriptions Last Dose Informant Patient Reported? Taking?   ANTACID CALCIUM 500 MG chewable tablet Unknown at Unknown time Care Giver Yes Yes   Sig: Take 2 chew tab by mouth 2 times daily as needed   DEEP SEA NASAL SPRAY 0.65 % nasal spray Unknown at Unknown time Care Giver Yes Yes   Sig: Spray 2 sprays in nostril every 2 hours as needed   Dapagliflozin-sAXagliptin (QTERN) 10-5 MG TABS 7/24/2022 at 0800 Care Giver Yes Yes   Sig: Take 10 mg by mouth daily   MUCUS RELIEF 600 MG 12 hr tablet Unknown at Unknown time Care Giver Yes Yes   Sig: Take 600 mg by mouth 2 times daily as needed   Vitamin D, Cholecalciferol, 50 MCG (2000 UT) CAPS 7/24/2022 at 0800 Care Giver Yes Yes   Sig: Take 2,000 Units by mouth daily   acetaminophen (TYLENOL) 500 MG tablet Unknown at Unknown time Care Giver Yes Yes   Sig: Take  500-1,000 mg by mouth 3 times daily as needed for mild pain   albuterol (PROAIR HFA) 108 (90 BASE) MCG/ACT inhaler Unknown at Unknown time  Yes Yes   Sig: Inhale two puffs by mouth every 4 to 6 hours prn   bacitracin 500 UNIT/GM external ointment 7/23/2022 at PM Care Giver Yes Yes   Sig: Apply topically 2 times daily Apply to wound bed twice daily on right great toe. Place gauze to cover and separate toes from rubbing. Wrap dressing with Kerlix to hold in place.   camphor-menthol (DERMASARRA) 0.5-0.5 % external lotion 7/24/2022 at 0800 Care Giver Yes Yes   Sig: Apply topically 2 times daily   carboxymethylcellulose PF (REFRESH PLUS) 0.5 % ophthalmic solution Unknown at Unknown time Care Giver Yes Yes   Sig: Place 1 drop into both eyes every 4 hours as needed for dry eyes   cholestyramine (QUESTRAN) 4 g packet Unknown at Unknown time Care Giver Yes Yes   Sig: Take 1 packet by mouth 3 times daily as needed   clindamycin (CLEOCIN) 300 MG capsule 7/24/2022 at 0800 Care Giver Yes Yes   Sig: Take 300 mg by mouth 3 times daily For 5 days.   diclofenac (VOLTAREN) 1 % topical gel 7/24/2022 at 0800 Care Giver Yes Yes   Sig: Apply 4 g topically 2 times daily Apply to right ankle.   escitalopram (LEXAPRO) 10 MG tablet 7/24/2022 at 0800 Care Giver Yes Yes   Sig: Take 10 mg by mouth daily   furosemide (LASIX) 20 MG tablet 7/22/2022 at 0800 Care Giver Yes Yes   Sig: Take 20 mg by mouth three times a week Take on Mon, Wed, Fri.   gabapentin (NEURONTIN) 300 MG capsule 7/23/2022 at PM Care Giver Yes Yes   Sig: Take 300 mg by mouth At Bedtime   insulin glargine (LANTUS VIAL) 100 UNIT/ML vial 7/23/2022 at PM Care Giver Yes Yes   Sig: Inject 12 Units Subcutaneous At Bedtime   insulin pen needle (BD AUTOSHIELD DUO) 30G X 5 MM   Yes No   Sig: USE TO INJECT INSULIN ONCE DAILY   ketoconazole (NIZORAL) 2 % external shampoo Past Week Care Giver Yes Yes   Sig: Apply 1 mL topically every 7 days   loperamide (IMODIUM) 2 MG capsule Unknown at  Unknown time Care Giver Yes Yes   Sig: Take 2-4 mg by mouth 4 times daily as needed for diarrhea   magnesium chloride 535 (64 Mg) MG TBEC CR tablet 7/24/2022 at 0800 Care Giver Yes Yes   Sig: Take 535 mg by mouth 2 times daily   metFORMIN (GLUCOPHAGE-XR) 500 MG 24 hr tablet 7/24/2022 at 0800 Care Giver Yes Yes   Sig: Take 500 mg by mouth daily   miconazole with skin protectant (NALDO ANTIFUNGAL) 2 % CREA cream Unknown at Unknown time Care Giver Yes Yes   Sig: Apply to sore on buttocks as needed.   mometasone (NASONEX) 50 MCG/ACT nasal spray 7/24/2022 at 0800 Care Giver Yes Yes   Sig: Spray 1 spray into both nostrils daily   multivitamin w/minerals (THERA-VIT-M) tablet 7/24/2022 at 0800 Care Giver Yes Yes   Sig: Take 1 tablet by mouth daily   nystatin (MYCOSTATIN) 344553 UNIT/GM external powder 7/24/2022 at 0800 Care Giver Yes Yes   Sig: Apply twice daily to groin folds.   omeprazole (PRILOSEC) 20 MG DR capsule 7/24/2022 at 0800 Care Giver Yes Yes   Sig: Take 10 mg by mouth daily      Facility-Administered Medications: None     Allergies   Allergies   Allergen Reactions     Amoxicillin Anaphylaxis and Hives     Penicillins Anaphylaxis and Hives     Pregabalin Other (See Comments)     Other reaction(s): Sedation       Ceclor  [Cefaclor]      Clarithromycin      Codeine      Other reaction(s): Chest Pain     Doxycycline Hives     Floxin [Ofloxacin]      Has tolerated cipro in the past     Glyburide      Patient doesn't know what allergy or reaction she's had. Might potentially be due to sulfa allergy.     Metformin Other (See Comments)     Other reaction(s): lactic acidosis     Morphine Nausea and Vomiting     Strawberry Hives     Sulfa Drugs Unknown     Shakiness/sweating     Tequin      Other reaction(s): Tremors     Cephalexin Rash     Tolerated ceftriaxone 7/24/22     Flonase [Fluticasone]      Bloody nose       Social History   I have reviewed this patient's social history and updated it with pertinent information  if needed. Mitali Richardson  reports that she has quit smoking. Her smoking use included cigarettes. She has never used smokeless tobacco. She reports that she does not drink alcohol and does not use drugs.    Family History   I have reviewed this patient's family history and updated it with pertinent information if needed.   No family history on file.    Review of Systems   The 10 point Review of Systems is negative other than noted in the HPI or here.     Physical Exam   Temp: 98.7  F (37.1  C) Temp src: Temporal BP: (!) 168/73 Pulse: 67   Resp: 20 SpO2: 92 % O2 Device: None (Room air)    Vital Signs with Ranges  Temp:  [97.8  F (36.6  C)-98.7  F (37.1  C)] 98.7  F (37.1  C)  Pulse:  [59-72] 67  Resp:  [18-24] 20  BP: (114-170)/(51-73) 168/73  SpO2:  [91 %-96 %] 92 %  145 lbs 0 oz    >> Please refer to the physical exam documented in the hospitalist/general medicine or ICU note in addition to what is documented here.    Data   ABIs most likley falsely elevated given occluded AT and PT in right calf.

## 2022-07-26 NOTE — PLAN OF CARE
"Goal Outcome Evaluation:      Pt is Alert  and oriented only self. She is anxious and agitated at times. pt prefers do not disturbed. Pt refused BG checks. She was yelling \" get out\", when IV antibiotic started  per schedule and become  combative. Unable to check cellulites site and do assessment, due to pt hitting and kicking ,will try agene later. Pure wick used with good output.Pt is A-2 with gate belt and walker.                "

## 2022-07-26 NOTE — PLAN OF CARE
Goal Outcome Evaluation:    Patient alert to self only. VS stable, needs assist of two using a walker and gait belt to bedside commode. Is incontinent and is using a pure wick. Tolerated a regular diet. Ultra sound done tonight. Tylenol used for pain management. Podiatry following. Right great toe cellulitis area red and black and is TOOTIE. Continues on IV antibiotic, discharge planning in progress.

## 2022-07-26 NOTE — PLAN OF CARE
Goal Outcome Evaluation:    Plan of Care Reviewed With: patient, daughter     Overall Patient Progress: no change    Afeb, vss, right great toe dusky and red, with streak on dorsal foot outlined. Podiatry did see pt and ordered daily dressing changed and the toe was dressed. Vascular surgery did a virtual consult and felt pt not a surgical candidate. IV abx continue, new IV started this pm. Up to recliner for a few hours, pt took some steps with walker. Teary today, saying she wants to die. Pure in with good urine output. Had BM today as well. Sugar high after syrup on pancakes for breakfast. Unsure of discharge plans.

## 2022-07-26 NOTE — PROGRESS NOTES
[facility]  PROGRESS NOTE    CHIEF COMPLAINT:      I was asked by Lev Jones to evaluate this patient for right foot.    PATIENT HISTORY:  Mitali Richardson is a 89 year old female seen in follow up for her right foot. States pain is improved since admission. Is in the chair currently. First time out of bed today. Denies N/F/V/C/D         Review of Systems:  A 10 point review of systems was performed and is positive for that noted above in the patient history.  All other areas are negative.     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 Current.     ALLERGIES:    Allergies   Allergen Reactions     Amoxicillin Anaphylaxis and Hives     Penicillins Anaphylaxis and Hives     Pregabalin Other (See Comments)     Other reaction(s): Sedation       Ceclor  [Cefaclor]      Clarithromycin      Codeine      Other reaction(s): Chest Pain     Doxycycline Hives     Floxin [Ofloxacin]      Has tolerated cipro in the past     Glyburide      Patient doesn't know what allergy or reaction she's had. Might potentially be due to sulfa allergy.     Metformin Other (See Comments)     Other reaction(s): lactic acidosis     Morphine Nausea and Vomiting     Strawberry Hives     Sulfa Drugs Unknown     Shakiness/sweating     Tequin      Other reaction(s): Tremors     Cephalexin Rash     Tolerated ceftriaxone 7/24/22     Flonase [Fluticasone]      Bloody nose        SOCIAL HISTORY:   Social History     Socioeconomic History     Marital status:      Spouse name: Not on file      Number of children: Not on file     Years of education: Not on file     Highest education level: Not on file   Occupational History     Not on file   Tobacco Use     Smoking status: Former Smoker     Types: Cigarettes     Smokeless tobacco: Never Used   Substance and Sexual Activity     Alcohol use: No     Drug use: No     Sexual activity: Never   Other Topics Concern     Parent/sibling w/ CABG, MI or angioplasty before 65F 55M? Not Asked   Social History Narrative     Not on file     Social Determinants of Health     Financial Resource Strain: Not on file   Food Insecurity: Not on file   Transportation Needs: Not on file   Physical Activity: Not on file   Stress: Not on file   Social Connections: Not on file   Intimate Partner Violence: Not on file   Housing Stability: Not on file        FAMILY HISTORY: No family history on file.     EXAM:Vitals: BP (!) 168/73 (BP Location: Left arm)   Pulse 67   Temp 98.5  F (36.9  C) (Temporal)   Resp 20   Wt 65.8 kg (145 lb)   SpO2 92%   BMI 29.29 kg/m    BMI= Body mass index is 29.29 kg/m .    LABS:   Hba1c: 8.9    Last Comprehensive Metabolic Panel:  Sodium   Date Value Ref Range Status   07/26/2022 132 (L) 133 - 144 mmol/L Final   12/21/2015 139 133 - 144 mmol/L Final     Potassium   Date Value Ref Range Status   07/26/2022 3.8 3.4 - 5.3 mmol/L Final   12/21/2015 4.0 3.4 - 5.3 mmol/L Final     Chloride   Date Value Ref Range Status   07/26/2022 102 94 - 109 mmol/L Final   12/21/2015 105 94 - 109 mmol/L Final     Carbon Dioxide   Date Value Ref Range Status   12/21/2015 26 20 - 32 mmol/L Final     Carbon Dioxide (CO2)   Date Value Ref Range Status   07/26/2022 23 20 - 32 mmol/L Final     Anion Gap   Date Value Ref Range Status   07/26/2022 7 3 - 14 mmol/L Final   12/21/2015 8 3 - 14 mmol/L Final     Glucose   Date Value Ref Range Status   07/26/2022 170 (H) 70 - 99 mg/dL Final   12/21/2015 216 (H) 70 - 99 mg/dL Final     GLUCOSE BY METER POCT   Date Value Ref Range  Status   07/26/2022 303 (H) 70 - 99 mg/dL Final     Urea Nitrogen   Date Value Ref Range Status   07/26/2022 13 7 - 30 mg/dL Final   12/21/2015 13 7 - 30 mg/dL Final     Creatinine   Date Value Ref Range Status   07/26/2022 0.57 0.52 - 1.04 mg/dL Final   12/21/2015 0.64 0.52 - 1.04 mg/dL Final     GFR Estimate   Date Value Ref Range Status   07/26/2022 85 >60 mL/min/1.73m2 Final     Comment:     Effective December 21, 2021 eGFRcr in adults is calculated using the 2021 CKD-EPI creatinine equation which includes age and gender (José et al., NEJM, DOI: 10.1056/NNUSjt8390181)   12/21/2015 88 >60 mL/min/1.7m2 Final     Comment:     Non  GFR Calc   12/21/2015 80 >60 mL/min/1.7m2 Final     Calcium   Date Value Ref Range Status   07/26/2022 8.8 8.5 - 10.1 mg/dL Final   12/21/2015 8.7 8.5 - 10.1 mg/dL Final     Lab Results   Component Value Date    WBC 9.1 07/25/2022    WBC 6.2 12/21/2015     Lab Results   Component Value Date    RBC 4.30 07/25/2022    RBC 4.50 12/21/2015     Lab Results   Component Value Date    HGB 11.3 07/25/2022    HGB 13.0 12/21/2015     Lab Results   Component Value Date    HCT 37.4 07/25/2022    HCT 39.2 12/21/2015     Lab Results   Component Value Date     07/25/2022     12/21/2015      No results found for: INR     General appearance: Patient is alert and fully cooperative with history & exam.  No sign of distress is noted during the visit.      Respiratory: Breathing is regular & unlabored while sitting.     HEENT: Hearing is intact to spoken word.  Speech is clear.  No gross evidence of visual impairment that would impact ambulation.      Dermatologic: Right hallux with dorsal eschars and distal devascularization. Dorsal tissue tears. No probe to deep tissue. No purulence. Violaceous color. Cool to touch. Cellulitis noted to dorsal foot. Likely also exacerbated by poor perfusion.  --> cellulitis improving      Vascular: Dorsalis pedis and posterior tibial pulses are  difficult to palpate & regular bilaterally.  CFT and skin temperature is normal to both lower extremities.       Neurologic: Lower extremity sensation is intact, bilateral foot, to light touch.  No evidence of neurological-based weakness or contracture in the lower extremities.       Musculoskeletal: Patient is minimally ambulatory without an assistive device or brace.      Psychiatric: Affect is pleasant & appropriate.      All cultures:  Recent Labs   Lab 07/25/22  2059 07/24/22  1313 07/24/22  1259   CULTURE No growth after 12 hours Positive on the 1st day of incubation*  Gram positive cocci in clusters* No growth after 1 day        IMAGING:  I personally reviewed the images and agree with the reports as stated.    IMPRESSION: No definite evidence of osteomyelitis. Osteopenia. Moderate degenerative changes at the IP joint of the great toe. Hammertoe deformities.    IMPRESSION:  1.  RIGHT LOWER EXTREMITY: Moderate resting ischemia. Digit pressure  not obtainable. Occlusive waveforms digits 1 through 3.  2.  LEFT LOWER EXTREMITY: Noncompressible vasculature at the dorsalis  pedis. Severe resting ischemia based on posterior tibial pressure.  However, this may not be reflective of true perfusion due to  noncompressibility of the dorsalis pedis. Digit pressure with favor  wound healing.    IMPRESSION:  1.  Diffuse atheromatous disease throughout the arteries of the right lower extremity.     2.  Anterior tibial and posterior tibial arteries are occluded at the distal calf level.     3.  Mostly monophasic waveform seen in the arteries of the right lower extremity suggest possible dynamically significant stenosis more proximally. MRA or CTA could better evaluate if indicated.    ASSESSMENT:  1. Right hallux eschars/skin tears, with cellulitis --> improving   2. Peripheral Arterial Disease  3. Uncontrolled Diabetes -->8.9     MEDICAL DECISION MAKING:   -Patient re evaluated, cellulitis improved. Dorsal and distal hallux  with continued ischemia.  -Vascular consult reviewed which indicates plan for palliative consult and not currently recommending vascular intervention or podiatry surgery, given patient's significant arterial disease.   -Agree with palliative consult.   -Discussed with betadine nursing, betadine to site, covered with dry dressing.   -WBAT  -Cont IV abx. One blood culture does show staph aureus in one out of two sets. No other signs of sepsis.   -Will place dressing change orders. Will sign off. Please call with any questions     Nishi Smith DPM   [facility]le [facility]  [phone]

## 2022-07-26 NOTE — PROGRESS NOTES
Hospitalist Progress Note  Name: Mitali Richardson    MRN: [id number]  Provider:  Moises Abdalla DO  Date of Service: 07/26/2022    Summary of Stay: Mitali Richardson is a 89 year old female with a history of type 2 diabetes mellitus, COPD, dementia admitted on 7/24/2022 with toe pain.  Patient is a poor historian so much of the history was obtained through the emergency department provider as well as the son at bedside.  In the emergency department, the patient was found to have a blood pressure of 117/79, heart rate 87, temperature 98.2  F, respiratory rate 16, SPO2 98% on room air.  Initial lab work showed mild leukocytosis of 12.6.  X-ray of the right great toe showed no definite evidence of osteomyelitis, moderate degenerative changes noted.  The patient was started on vancomycin, clindamycin, and IV ceftriaxone.  Podiatry was consulted to see the patient.  Podiatry recommended against surgical intervention at this point, however did recommend vascular surgery evaluation and ABIs.  Arterial duplex suggested aortoiliac occlusive disease along with tibial level occlusive disease that are not adequate for healing.  Vascular surgery recommended aspirin 81 mg, statin with Lipitor 40 mg, antibiotics, and palliative care evaluation given the occlusive disease in her lower extremity and unlikelihood of healing of her lower extremity wound.    TODAY'S PLAN: Appreciate vascular surgery and podiatry recommendations.  Palliative care consulted and appreciate assistance.  Occlusive disease in her lower extremities signify low likelihood of healing of her toe wound.  Could put her at risk of developing other wounds that may worsen and develop sepsis, which could potentially be fatal.  Updated son Juan Ramon via phone.  This information was relayed to Juan Ramon who expressed understanding.  He stated that he did not think that his mother was quite ready for hospice.    Problem List:   Toe pain  Right Toe  Cellulitis  Peripheral Vascular Disease  3 days of right great toe wound now with erythema that is tracking up into the foot along with increased pain, and warmth.  Wound likely related to type 2 diabetes, related polyneuropathy.  Overall concern for diabetic ulcer that has now progressed to right great toe and right foot cellulitis.  Initiated on clinda, vancomycin, ceftriaxone in the emergency department.  - Podiatry consultation  - Continue vanc/ceftri/clinda (given abx allergies)  - ABDELRAHMAN showed aortoiliac occlusive disease along with tibial level occlusive disease  - Vascular Surgery recommendations appreciated - recommended ASA 81 mg daily, Lipitor 40 mg at bedtime, Abx, and Palliative Care assessment  - Appreciate Vascular Surgery recommendations  - Tylenol for pain  - Appreciate Palliative Care recommendations     1/2 Blood Cultures Positive for Staph Epidermidis  - Likely contaminant  - Repeat blood culture pending     T2DM  Appears to take Qtern, Amaryl, Lantus 12 units every morning at baseline.  - Lantus 5 units every morning for now  - MDSSI     COPD  - No evidence of exacerbation.  Continue PTA inhalers once med rec complete     Dementia  - Unreliable historian.  Delirium precautions while here    DVT Prophylaxis: Pneumatic Compression Devices  Code Status: Full Code  Diet: Moderate Consistent Carb (60 g CHO per Meal) Diet    Zhang Catheter: Not present  Disposition: Expected discharge in 2-3 days to home with home care vs TCU vs hospice. Goals prior to discharge include abx plan established, palliative care evaluation complete.   Family updated today: Yes      Interval History   Pt seen and examined.  Pt denies foot pain.  Staring out the window upon arrival and appears comfortable.    -Data reviewed today: I personally reviewed all new labs and imaging results over the last 24 hours.     Physical Exam   Temp: 98.5  F (36.9  C) Temp src: Temporal BP: (!) 168/73 Pulse: 67   Resp: 20 SpO2: 92 % O2 Device:  None (Room air)    Vitals:    07/24/22 1256   Weight: 65.8 kg (145 lb)     Vital Signs with Ranges  Temp:  [97.8  F (36.6  C)-98.7  F (37.1  C)] 98.5  F (36.9  C)  Pulse:  [59-67] 67  Resp:  [18-20] 20  BP: (114-170)/(51-73) 168/73  SpO2:  [91 %-94 %] 92 %  I/O last 3 completed shifts:  In: 655 [P.O.:540; I.V.:115]  Out: 1725 [Urine:1725]    GENERAL: No apparent distress. Awake, alert  HEENT: Normocephalic, atraumatic. Extraocular movements intact.  CARDIOVASCULAR: Regular rate and rhythm without murmurs or rubs. No S3.  PULMONARY: Clear bilaterally.  GASTROINTESTINAL: Soft, non-tender, non-distended. Bowel sounds normoactive.   EXTREMITIES: Right foot bandage  NEUROLOGICAL: CN 2-12 grossly intact, no focal neurological deficits.  DERMATOLOGICAL: No rash, ulcer, bruising, nor jaundice.    Medications       cefTRIAXone  1 g Intravenous Q24H     clindamycin  900 mg Intravenous Q8H     escitalopram  10 mg Oral Daily     insulin aspart  1-6 Units Subcutaneous TID w/meals     insulin glargine  5 Units Subcutaneous QAM AC     miconazole   Topical BID     pantoprazole  20 mg Oral Daily     sodium chloride (PF)  3 mL Intracatheter Q8H     vancomycin  1,000 mg Intravenous Once    Followed by     [START ON 7/27/2022] vancomycin  750 mg Intravenous Q12H     Vitamin D3  2,000 Units Oral Daily     Data     Laboratory:  Recent Labs   Lab 07/26/22  0610 07/25/22  0632 07/24/22  1204   WBC 9.8 9.1 12.6*   HGB 12.4 11.3* 12.8   HCT 40.9 37.4 41.7   MCV 87 87 88    172 188     Recent Labs   Lab 07/26/22  1159 07/26/22  0825 07/26/22  0610 07/25/22  2150 07/25/22  1306 07/25/22  0922 07/25/22  0632 07/24/22  1746 07/24/22  1204   NA  --   --  132*  --   --   --  133  --  131*   POTASSIUM  --   --  3.8  --  3.9  --  3.2*  --  3.6   CHLORIDE  --   --  102  --   --   --  101  --  98   CO2  --   --  23  --   --   --  25  --  26   ANIONGAP  --   --  7  --   --   --  7  --  7   * 180* 170*   < >  --    < > 116*   < > 136*    BUN  --   --  13  --   --   --  20  --  28   CR  --   --  0.57  --   --   --  0.58  --  0.85   GFRESTIMATED  --   --  85  --   --   --  85  --  64   ALONSO  --   --  8.8  --   --   --  8.1*  --  9.1    < > = values in this interval not displayed.     No results for input(s): CULT in the last 168 hours.    Imaging:  Recent Results (from the past 24 hour(s))   US Lower Extremity Arterial Duplex Right    Narrative    EXAM: US LOWER EXTREMITY ARTERIAL DUPLEX RIGHT  LOCATION: [facility]  DATE/TIME: 7/25/2022 10:22 PM    INDICATION: Cellulitis and Eschar on right great toe with poor waveform on ABDELRAHMAN.  COMPARISON: None.  TECHNIQUE: Duplex utilizing 2D gray-scale imaging, Doppler interrogation with color-flow and spectral waveform analysis.    FINDINGS: Diffuse atheromatous plaque seen throughout the arteries of the right lower extremity. There are mostly monophasic waveforms seen throughout the arteries of the right lower extremity suggesting potential hemodynamically significant stenosis   more proximally. CTA or MRA could BE considered in further evaluation if indicated. Anterior tibial and posterior tibial arteries are occluded at the distal calf level.    RIGHT LOWER EXTREMITY ARTERIAL ASSESSMENT:    Common femoral artery: 35 cm/s  Profunda femoris artery: 55 cm/s  SFA (proximal): 52 cm/s  SFA (mid): 25 cm/s  SFA (distal): 20 cm/s  Popliteal artery: 39 cm/s  Posterior tibial artery: Occluded   Dorsalis pedis artery: Occluded       Impression    IMPRESSION:  1.  Diffuse atheromatous disease throughout the arteries of the right lower extremity.    2.  Anterior tibial and posterior tibial arteries are occluded at the distal calf level.    3.  Mostly monophasic waveform seen in the arteries of the right lower extremity suggest possible dynamically significant stenosis more proximally. MRA or CTA could better evaluate if indicated.         Moises Abdalla DO  [facility]  [address] [place] [place] [address]  07/26/2022

## 2022-07-26 NOTE — CONSULTS
"[facility]  Palliative Care Consultation   Text Page    Assessment & Plan   Mitali Richardson is a 89 year old female who was admitted on 7/24/2022.   Consulted by Hospitalist Moises Abdalla DO to assist with goals of care, recommended per podiatry.    Recommendations:  1. Goals of Care- No CPR- Do NOT Intubate - Restorative care with plan for dismissal to LTC with support of hospice.   Hospitalization goals discussed with son/health care agent Gustavo and daughter/second alternate healthcare agent Saima.  Decisional Capacity- Unreliable. Patient has an advance directive dated 6/29/2021.  Consents and decision making should be done by her son, Karri \"Juan Ramon\" Dylan , the primary Health Care Agent.  Alternate is her grandson, Evan Richardson. Second alternate is her daughter Reta Oviedo and third alternate is her daughter Josie Anderson.  POLST - Complete indicating the patient's request for COMFORT-FOCUSED TREATMENT PLAN.     2. Pain - Chronic low back pain and right toe/foot pain. Patient currently denies discomfort. Reasonable to continue Tylenol as needed and observe.     Patient's opioid use in past 24 hours: None = 0 mg Daily Morphine Equivalent  [place] Board of Pharmacy Data Base Reviewed:    YES; As expected, no concern for misuse/abuse of controlled medications based on this report.   Overdose Risk Score = 380    3. Generalized weakness - Continue activity as tolerated. Dismissal plan to LTC with support of hospice.    4. Spiritual Care  Oriented to Spiritual Health as part of Palliative Care team. Consultation placed for  to follow.  Spiritual Background: Undesignated    5. Care Planning  SW consultation placed for LTC placement and hospice dismissal. .  Medications for discharge - TBD once dismissal plan is arranged for hospice.     Medical Decision Making and Goals of Care:  Discussed on July 26, 2022 with GLORIA Richard CNS:     Appreciate the phone call from " "Hospitalist Moises Abdalla, DO letting me know the patient's son would arrive at 4:30 PM. I met with Mitali as she was resting in bed. Her son/healthcare agent Karri \"Juan Ramon\" Dylan and daughter/second healthcare agent Reta \"Saima\" Preet requested that we meet in private, given the patient's dementia. Juan Ramon, Saima and I moved to the six floor conference room. Saima acknowledged \"I'm just mad about everything.\" It seems that she is displeased that Mitali is \"in a nursing home far away from family. It's like a dungeon there!\" Saima clarified that she had worked at [facility] and took pride in the care she provided to the residents. She is currently working part time, as she had undergone cancer treatments, but she is quite concerned about her mother's care at the memory care facility she resides. Juan Ramon offered that Mitali seemed to get good care when she had been at [facility] in [place], and is hopeful she could move there. Saima does not agree (as other family members and she lives in [facility]). Juan Ramon acknowledged he was named Mitali's decision maker. Saima retorted \"Then none of the family get's to decide?\" I redirected that our effort is to determine the type of care Mitali would want if she was able to give insight. Saima asked that I speak with Mitali, yet I offered that given her mother's dementia and debilitated state she does not currently demonstrate medical capacity. Saima acknowledged \"That's just because she's been so sick. Maybe we could talk about this with her when she's better.\" I offered the question, but what if she's doesn't get better? Saima explained that she had seen hospice when she worked at [facility] and we discussed the hospice benefit. Juan Ramon agreed with using hospice, but in discussing code status he thought that his mother wanted to be a full code. Fortunately I had brought a copy of Mitali's Advanced Directive and read page 4 aloud which Mitali includes the statement: \"I do " "not want to continue to live through mechanical ventilation, supplemental nutrition or other artificial means\".  We discussed the anticipated outcome with CPR given Mitali's advanced agent and co-morbidities. Juan Ramon request to talk with the surgeon before making any decisions. With Juan Ramon's permission, I read aloud Vascular Surgeon Kathy Hinton MD's note from earlier today. Juan Ramon acknowledged that he was with his mother when she had imaging and \"They said she didn't have any blood flow below the knee.\" Saima and Juan Ramon agreed with a plan to have the support on hospice on dismissal, and we completed a POLST indicating the patient's preference for COMFORT-FOCUSED TREATMENT.       After the care conference, Juan Ramon asked to speak with me privately. He acknowledged \"I want the rest of the family to be involved, but mom chose me to make decisions for a reason.\" He offered concern regarding Saima's decision making ability.    Thank you for involving us in the patient's care.       Alicja Schafer MS, RN, CNS, APRN, ACHPN, FAACVPR  Pain and Palliative Care  Pager [phone]  Office [phone]       Time Spent on this Encounter   Total unit/floor time 4:30 PM until 7:15 PM, time consisted of the following, examination of the patient, reviewing the record and completing documentation. >50% of time spent in counseling and coordination of care, Vascular Surgeon Kathy Hinton MD; Bedside Nurse Arabella Guallpa RN and Hospitalist Moises Abdalla DO.  Time spend counseling with family consisted of the following topics, goals of care, education about diagnosis, education about prognosis, care planning for discharge and symptom management.    Understanding of disease process:   This has been discussed with the patient's son has basic understanding of the patient's disease process. He would like to chat with Vascular Surgeon Kathy Hinton MD, whom I have Vocera messaged to contact Juan Ramon.    History of Present Illness   History is obtained from the electronic " health record and patient's family    Mitali Richardson is a 89 year old female with a history of type 2 diabetes mellitus, COPD, dementia admitted on 7/24/2022 with toe pain.  Patient is a poor historian so much of the history was obtained through the emergency department provider as well as the son at bedside.  In the emergency department, the patient was found to have a blood pressure of 117/79, heart rate 87, temperature 98.2  F, respiratory rate 16, SPO2 98% on room air.  Initial lab work showed mild leukocytosis of 12.6.  X-ray of the right great toe showed no definite evidence of osteomyelitis, moderate degenerative changes noted.  The patient was started on vancomycin, clindamycin, and IV ceftriaxone.  Podiatry was consulted to see the patient.  Podiatry recommended against surgical intervention at this point, however did recommend vascular surgery evaluation and ABIs.  Arterial duplex suggested aortoiliac occlusive disease along with tibial level occlusive disease that are not adequate for healing.  Vascular surgery recommended aspirin 81 mg, statin with Lipitor 40 mg, antibiotics, and palliative care evaluation given the occlusive disease in her lower extremity and unlikelihood of healing of her lower extremity wound.      Past Medical History   I have reviewed this patient's medical history and updated it with pertinent information if needed.   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  Past Surgical History:   Procedure Laterality Date     LAPAROSCOPIC CHOLECYSTECTOMY WITH CHOLANGIOGRAMS N/A 10/31/2015    Procedure: LAPAROSCOPIC CHOLECYSTECTOMY WITH CHOLANGIOGRAMS;  Surgeon: Kathleen Clark MD;  Location: [facility] OR       Prior to Admission Medications   Prior to  Admission Medications   Prescriptions Last Dose Informant Patient Reported? Taking?   ANTACID CALCIUM 500 MG chewable tablet Unknown at Unknown time Care Giver Yes Yes   Sig: Take 2 chew tab by mouth 2 times daily as needed   DEEP SEA NASAL SPRAY 0.65 % nasal spray Unknown at Unknown time Care Giver Yes Yes   Sig: Spray 2 sprays in nostril every 2 hours as needed   Dapagliflozin-sAXagliptin (QTERN) 10-5 MG TABS 7/24/2022 at 0800 Care Giver Yes Yes   Sig: Take 10 mg by mouth daily   MUCUS RELIEF 600 MG 12 hr tablet Unknown at Unknown time Care Giver Yes Yes   Sig: Take 600 mg by mouth 2 times daily as needed   Vitamin D, Cholecalciferol, 50 MCG (2000 UT) CAPS 7/24/2022 at 0800 Care Giver Yes Yes   Sig: Take 2,000 Units by mouth daily   acetaminophen (TYLENOL) 500 MG tablet Unknown at Unknown time Care Giver Yes Yes   Sig: Take 500-1,000 mg by mouth 3 times daily as needed for mild pain   albuterol (PROAIR HFA) 108 (90 BASE) MCG/ACT inhaler Unknown at Unknown time  Yes Yes   Sig: Inhale two puffs by mouth every 4 to 6 hours prn   bacitracin 500 UNIT/GM external ointment 7/23/2022 at PM Care Giver Yes Yes   Sig: Apply topically 2 times daily Apply to wound bed twice daily on right great toe. Place gauze to cover and separate toes from rubbing. Wrap dressing with Kerlix to hold in place.   camphor-menthol (DERMASARRA) 0.5-0.5 % external lotion 7/24/2022 at 0800 Care Giver Yes Yes   Sig: Apply topically 2 times daily   carboxymethylcellulose PF (REFRESH PLUS) 0.5 % ophthalmic solution Unknown at Unknown time Care Giver Yes Yes   Sig: Place 1 drop into both eyes every 4 hours as needed for dry eyes   cholestyramine (QUESTRAN) 4 g packet Unknown at Unknown time Care Giver Yes Yes   Sig: Take 1 packet by mouth 3 times daily as needed   clindamycin (CLEOCIN) 300 MG capsule 7/24/2022 at 0800 Care Giver Yes Yes   Sig: Take 300 mg by mouth 3 times daily For 5 days.   diclofenac (VOLTAREN) 1 % topical gel 7/24/2022 at 0800 Care  Giver Yes Yes   Sig: Apply 4 g topically 2 times daily Apply to right ankle.   escitalopram (LEXAPRO) 10 MG tablet 7/24/2022 at 0800 Care Giver Yes Yes   Sig: Take 10 mg by mouth daily   furosemide (LASIX) 20 MG tablet 7/22/2022 at 0800 Care Giver Yes Yes   Sig: Take 20 mg by mouth three times a week Take on Mon, Wed, Fri.   gabapentin (NEURONTIN) 300 MG capsule 7/23/2022 at PM Care Giver Yes Yes   Sig: Take 300 mg by mouth At Bedtime   insulin glargine (LANTUS VIAL) 100 UNIT/ML vial 7/23/2022 at PM Care Giver Yes Yes   Sig: Inject 12 Units Subcutaneous At Bedtime   insulin pen needle (BD AUTOSHIELD DUO) 30G X 5 MM   Yes No   Sig: USE TO INJECT INSULIN ONCE DAILY   ketoconazole (NIZORAL) 2 % external shampoo Past Week Care Giver Yes Yes   Sig: Apply 1 mL topically every 7 days   loperamide (IMODIUM) 2 MG capsule Unknown at Unknown time Care Giver Yes Yes   Sig: Take 2-4 mg by mouth 4 times daily as needed for diarrhea   magnesium chloride 535 (64 Mg) MG TBEC CR tablet 7/24/2022 at 0800 Care Giver Yes Yes   Sig: Take 535 mg by mouth 2 times daily   metFORMIN (GLUCOPHAGE-XR) 500 MG 24 hr tablet 7/24/2022 at 0800 Care Giver Yes Yes   Sig: Take 500 mg by mouth daily   miconazole with skin protectant (NALDO ANTIFUNGAL) 2 % CREA cream Unknown at Unknown time Care Giver Yes Yes   Sig: Apply to sore on buttocks as needed.   mometasone (NASONEX) 50 MCG/ACT nasal spray 7/24/2022 at 0800 Care Giver Yes Yes   Sig: Spray 1 spray into both nostrils daily   multivitamin w/minerals (THERA-VIT-M) tablet 7/24/2022 at 0800 Care Giver Yes Yes   Sig: Take 1 tablet by mouth daily   nystatin (MYCOSTATIN) 172396 UNIT/GM external powder 7/24/2022 at 0800 Care Giver Yes Yes   Sig: Apply twice daily to groin folds.   omeprazole (PRILOSEC) 20 MG DR capsule 7/24/2022 at 0800 Care Giver Yes Yes   Sig: Take 10 mg by mouth daily      Facility-Administered Medications: None     Allergies   Allergies   Allergen Reactions     Amoxicillin Anaphylaxis  and Hives     Penicillins Anaphylaxis and Hives     Pregabalin Other (See Comments)     Other reaction(s): Sedation       Ceclor  [Cefaclor]      Clarithromycin      Codeine      Other reaction(s): Chest Pain     Doxycycline Hives     Floxin [Ofloxacin]      Has tolerated cipro in the past     Glyburide      Patient doesn't know what allergy or reaction she's had. Might potentially be due to sulfa allergy.     Metformin Other (See Comments)     Other reaction(s): lactic acidosis     Morphine Nausea and Vomiting     Strawberry Hives     Sulfa Drugs Unknown     Shakiness/sweating     Tequin      Other reaction(s): Tremors     Cephalexin Rash     Tolerated ceftriaxone 7/24/22     Flonase [Fluticasone]      Bloody nose       Social History        Living situation: Memory care facility       Support system: Family   History of substance use/abuse:  reports that she has quit smoking. Her smoking use included cigarettes. She has never used smokeless tobacco.  reports no history of alcohol use.    Family History   I have reviewed this patient's family history and updated it with pertinent information if needed.   No family history on file.    Review of Systems   Review of systems not obtained due to patient factors - confusion and mental status    Physical Exam   Temp:  [97  F (36.1  C)-98.7  F (37.1  C)] 97  F (36.1  C)  Pulse:  [59-68] 68  Resp:  [16-20] 16  BP: (121-170)/(56-73) 137/56  SpO2:  [91 %-94 %] 94 %  145 lbs 0 oz  Exam:  GEN:  Frail elderly  female. Alert, oriented to self only, appears comfortable, no apparent distress.  HEENT:  Normocephalic/atraumatic, no scleral icterus, no nasal discharge, mouth moist.  CV:  RRR, S1, S2; no murmurs or other irregularities noted.  +3 DP/PT pulses bilaterally; no edema BLE.  RESP:  Clear to auscultation bilaterally without rales/rhonchi/wheezing/retractions.  Symmetric chest rise on inhalation noted.  Normal respiratory effort.  ABD:  Rounded, soft,  non-tender/non-distended.  +BS  EXT:  CMS intact x 4.     M/S:   Denies discomfort with palpation of spine and extremities.    SKIN:  Warm and dry to touch, foot was not assessed as patient had her socks on and was sitting at the edge of the bed eating a snack.   NEURO: Deferred.  PAIN BEHAVIOR: Cooperative  Psych:  Normal affect.  Calm, cooperative, conversant, but does not recall recent events     Data   Results for orders placed or performed during the hospital encounter of 07/24/22   XR Toe Right G/E 2 Views     Status: None    Narrative    EXAM: XR TOE RIGHT G/E 2 VIEWS  LOCATION: [facility]  DATE/TIME: 07/24/2022, 1:19 PM    INDICATION: Swelling, redness, pain ?osteo.  COMPARISON: None.      Impression    IMPRESSION: No definite evidence of osteomyelitis. Osteopenia. Moderate degenerative changes at the IP joint of the great toe. Hammertoe deformities.     US ABDELRAHMAN Doppler No Exercise     Status: None    Narrative    [facility]  LOCATION: [facility]  DATE: 7/25/2022    EXAM: RESTING ANKLE-BRACHIAL INDICES (ABIs)    INDICATION: Decreased pedal pulses. Right foot wound.    COMPARISON: None.    ABDELRAHMAN FINDINGS:   RIGHT:  Brachial: 137  Ankle (PT): Not obtainable  Ankle (DP): 94--0.67  Digit: Not obtainable    LEFT:  Brachial: 140  Ankle (PT): 27--0.19  Ankle (DP): Greater than 254--noncompressible  Digit: 57--0.41    WAVEFORMS:   RIGHT: Monophasic at the ankle. Occlusive at digits 1, 2 and 3 and  stenotic at the fourth and fifth digits.  LEFT: Multiphasic at the femoral and popliteal segments an monophasic  of the tibial vessels. Stenotic wave forms throughout the digits.      Impression    IMPRESSION:  1.  RIGHT LOWER EXTREMITY: Moderate resting ischemia. Digit pressure  not obtainable. Occlusive waveforms digits 1 through 3.  2.  LEFT LOWER EXTREMITY: Noncompressible vasculature at the dorsalis  pedis. Severe resting ischemia based on posterior tibial pressure.  However,  this may not be reflective of true perfusion due to  noncompressibility of the dorsalis pedis. Digit pressure with favor  wound healing.    FEDERICA RHODES MD         SYSTEM ID:  [id number]   US Lower Extremity Arterial Duplex Right     Status: None    Narrative    EXAM: US LOWER EXTREMITY ARTERIAL DUPLEX RIGHT  LOCATION: [facility]  DATE/TIME: 7/25/2022 10:22 PM    INDICATION: Cellulitis and Eschar on right great toe with poor waveform on ABDELRAHMAN.  COMPARISON: None.  TECHNIQUE: Duplex utilizing 2D gray-scale imaging, Doppler interrogation with color-flow and spectral waveform analysis.    FINDINGS: Diffuse atheromatous plaque seen throughout the arteries of the right lower extremity. There are mostly monophasic waveforms seen throughout the arteries of the right lower extremity suggesting potential hemodynamically significant stenosis   more proximally. CTA or MRA could BE considered in further evaluation if indicated. Anterior tibial and posterior tibial arteries are occluded at the distal calf level.    RIGHT LOWER EXTREMITY ARTERIAL ASSESSMENT:    Common femoral artery: 35 cm/s  Profunda femoris artery: 55 cm/s  SFA (proximal): 52 cm/s  SFA (mid): 25 cm/s  SFA (distal): 20 cm/s  Popliteal artery: 39 cm/s  Posterior tibial artery: Occluded   Dorsalis pedis artery: Occluded       Impression    IMPRESSION:  1.  Diffuse atheromatous disease throughout the arteries of the right lower extremity.    2.  Anterior tibial and posterior tibial arteries are occluded at the distal calf level.    3.  Mostly monophasic waveform seen in the arteries of the right lower extremity suggest possible dynamically significant stenosis more proximally. MRA or CTA could better evaluate if indicated.   [place] Draw     Status: None    Narrative    The following orders were created for panel order [place] Draw.  Procedure                               Abnormality         Status                     ---------                                -----------         ------                     Extra Blood Culture Bottle[[id number]]                       Final result               Extra Blue Top Tube[[id number]]                              Final result               Extra Red Top Tube[[id number]]                               Final result                 Please view results for these tests on the individual orders.   Basic metabolic panel (BMP)     Status: Abnormal   Result Value Ref Range    Sodium 131 (L) 133 - 144 mmol/L    Potassium 3.6 3.4 - 5.3 mmol/L    Chloride 98 94 - 109 mmol/L    Carbon Dioxide (CO2) 26 20 - 32 mmol/L    Anion Gap 7 3 - 14 mmol/L    Urea Nitrogen 28 7 - 30 mg/dL    Creatinine 0.85 0.52 - 1.04 mg/dL    Calcium 9.1 8.5 - 10.1 mg/dL    Glucose 136 (H) 70 - 99 mg/dL    GFR Estimate 64 >60 mL/min/1.73m2   Extra Blood Culture Bottle     Status: None   Result Value Ref Range    Hold Specimen JIC    Extra Blue Top Tube     Status: None   Result Value Ref Range    Hold Specimen JIC    Extra Red Top Tube     Status: None   Result Value Ref Range    Hold Specimen JIC    CBC with platelets and differential     Status: Abnormal   Result Value Ref Range    WBC Count 12.6 (H) 4.0 - 11.0 10e3/uL    RBC Count 4.72 3.80 - 5.20 10e6/uL    Hemoglobin 12.8 11.7 - 15.7 g/dL    Hematocrit 41.7 35.0 - 47.0 %    MCV 88 78 - 100 fL    MCH 27.1 26.5 - 33.0 pg    MCHC 30.7 (L) 31.5 - 36.5 g/dL    RDW 14.7 10.0 - 15.0 %    Platelet Count 188 150 - 450 10e3/uL    % Neutrophils 83 %    % Lymphocytes 8 %    % Monocytes 8 %    % Eosinophils 0 %    % Basophils 0 %    % Immature Granulocytes 1 %    NRBCs per 100 WBC 0 <1 /100    Absolute Neutrophils 10.5 (H) 1.6 - 8.3 10e3/uL    Absolute Lymphocytes 1.0 0.8 - 5.3 10e3/uL    Absolute Monocytes 1.0 0.0 - 1.3 10e3/uL    Absolute Eosinophils 0.0 0.0 - 0.7 10e3/uL    Absolute Basophils 0.1 0.0 - 0.2 10e3/uL    Absolute Immature Granulocytes 0.1 <=0.4 10e3/uL    Absolute NRBCs 0.0 10e3/uL   CRP inflammation      Status: Abnormal   Result Value Ref Range    CRP Inflammation 75.1 (H) 0.0 - 8.0 mg/L   Erythrocyte sedimentation rate auto     Status: Normal   Result Value Ref Range    Erythrocyte Sedimentation Rate 26 0 - 30 mm/hr   Lactic acid whole blood     Status: Normal   Result Value Ref Range    Lactic Acid 1.4 0.7 - 2.0 mmol/L   Asymptomatic COVID-19 Virus (Coronavirus) by PCR Nasopharyngeal     Status: Normal    Specimen: Nasopharyngeal; Swab   Result Value Ref Range    SARS CoV2 PCR Negative Negative    Narrative    Testing was performed using the [employer] Xpress SARS-CoV-2 Assay on the   [employer]. Additional information about   this Emergency Use Authorization (EUA) assay can be found via the Lab   Guide. This test should be ordered for the detection of SARS-CoV-2 in   individuals who meet SARS-CoV-2 clinical and/or epidemiological   criteria. Test performance is unknown in asymptomatic patients. This   test is for in vitro diagnostic use under the FDA EUA for   laboratories certified under CLIA to perform high complexity testing.   This test has not been FDA cleared or approved. A negative result   does not rule out the presence of PCR inhibitors in the specimen or   target RNA in concentration below the limit of detection for the   assay. The possibility of a false negative should be considered if   the patient's recent exposure or clinical presentation suggests   COVID-19. This test was validated by the [facility]. This laboratory is certified under the Clinical Laboratory Improvement Amendments of 1988 (CLIA-88) as qualified to perform high complexity laboratory testing.     Magnesium     Status: Normal   Result Value Ref Range    Magnesium 2.0 1.6 - 2.3 mg/dL   Phosphorus     Status: Abnormal   Result Value Ref Range    Phosphorus 2.4 (L) 2.5 - 4.5 mg/dL   Glucose by meter     Status: Abnormal   Result Value Ref Range    GLUCOSE BY METER POCT 134 (H)  70 - 99 mg/dL   Glucose by meter     Status: Abnormal   Result Value Ref Range    GLUCOSE BY METER POCT 215 (H) 70 - 99 mg/dL   Glucose by meter     Status: Abnormal   Result Value Ref Range    GLUCOSE BY METER POCT 137 (H) 70 - 99 mg/dL   Basic metabolic panel     Status: Abnormal   Result Value Ref Range    Sodium 133 133 - 144 mmol/L    Potassium 3.2 (L) 3.4 - 5.3 mmol/L    Chloride 101 94 - 109 mmol/L    Carbon Dioxide (CO2) 25 20 - 32 mmol/L    Anion Gap 7 3 - 14 mmol/L    Urea Nitrogen 20 7 - 30 mg/dL    Creatinine 0.58 0.52 - 1.04 mg/dL    Calcium 8.1 (L) 8.5 - 10.1 mg/dL    Glucose 116 (H) 70 - 99 mg/dL    GFR Estimate 85 >60 mL/min/1.73m2   CBC with platelets     Status: Abnormal   Result Value Ref Range    WBC Count 9.1 4.0 - 11.0 10e3/uL    RBC Count 4.30 3.80 - 5.20 10e6/uL    Hemoglobin 11.3 (L) 11.7 - 15.7 g/dL    Hematocrit 37.4 35.0 - 47.0 %    MCV 87 78 - 100 fL    MCH 26.3 (L) 26.5 - 33.0 pg    MCHC 30.2 (L) 31.5 - 36.5 g/dL    RDW 14.9 10.0 - 15.0 %    Platelet Count 172 150 - 450 10e3/uL   CRP inflammation     Status: Abnormal   Result Value Ref Range    CRP Inflammation 44.4 (H) 0.0 - 8.0 mg/L   Glucose by meter     Status: Normal   Result Value Ref Range    GLUCOSE BY METER POCT 91 70 - 99 mg/dL   Glucose by meter     Status: Abnormal   Result Value Ref Range    GLUCOSE BY METER POCT 125 (H) 70 - 99 mg/dL   Potassium     Status: Normal   Result Value Ref Range    Potassium 3.9 3.4 - 5.3 mmol/L   Phosphorus     Status: Normal   Result Value Ref Range    Phosphorus 3.3 2.5 - 4.5 mg/dL   Glucose by meter     Status: Abnormal   Result Value Ref Range    GLUCOSE BY METER POCT 113 (H) 70 - 99 mg/dL   Extra Tube     Status: None    Narrative    The following orders were created for panel order Extra Tube.  Procedure                               Abnormality         Status                     ---------                               -----------         ------                     Extra Purple Top  Tube[[id number]]                            Final result                 Please view results for these tests on the individual orders.   Extra Purple Top Tube     Status: None   Result Value Ref Range    Hold Specimen JIC    Glucose by meter     Status: Abnormal   Result Value Ref Range    GLUCOSE BY METER POCT 236 (H) 70 - 99 mg/dL   Extra Tube     Status: None    Narrative    The following orders were created for panel order Extra Tube.  Procedure                               Abnormality         Status                     ---------                               -----------         ------                     Extra Green Top (Lithium...[[id number]]                      Final result                 Please view results for these tests on the individual orders.   Extra Green Top (Lithium Heparin) Tube     Status: None   Result Value Ref Range    Hold Specimen JIC    Phosphorus     Status: Normal   Result Value Ref Range    Phosphorus 3.2 2.5 - 4.5 mg/dL   CBC with platelets     Status: Abnormal   Result Value Ref Range    WBC Count 9.8 4.0 - 11.0 10e3/uL    RBC Count 4.70 3.80 - 5.20 10e6/uL    Hemoglobin 12.4 11.7 - 15.7 g/dL    Hematocrit 40.9 35.0 - 47.0 %    MCV 87 78 - 100 fL    MCH 26.4 (L) 26.5 - 33.0 pg    MCHC 30.3 (L) 31.5 - 36.5 g/dL    RDW 14.6 10.0 - 15.0 %    Platelet Count 185 150 - 450 10e3/uL   Basic metabolic panel     Status: Abnormal   Result Value Ref Range    Sodium 132 (L) 133 - 144 mmol/L    Potassium 3.8 3.4 - 5.3 mmol/L    Chloride 102 94 - 109 mmol/L    Carbon Dioxide (CO2) 23 20 - 32 mmol/L    Anion Gap 7 3 - 14 mmol/L    Urea Nitrogen 13 7 - 30 mg/dL    Creatinine 0.57 0.52 - 1.04 mg/dL    Calcium 8.8 8.5 - 10.1 mg/dL    Glucose 170 (H) 70 - 99 mg/dL    GFR Estimate 85 >60 mL/min/1.73m2   Glucose by meter     Status: Abnormal   Result Value Ref Range    GLUCOSE BY METER POCT 191 (H) 70 - 99 mg/dL   Vancomycin level     Status: Normal   Result Value Ref Range    Vancomycin 7.2   ug/mL    Glucose by meter     Status: Abnormal   Result Value Ref Range    GLUCOSE BY METER POCT 180 (H) 70 - 99 mg/dL   Glucose by meter     Status: Abnormal   Result Value Ref Range    GLUCOSE BY METER POCT 303 (H) 70 - 99 mg/dL   Glucose by meter     Status: Abnormal   Result Value Ref Range    GLUCOSE BY METER POCT 197 (H) 70 - 99 mg/dL   Blood Culture Peripheral Blood     Status: Normal (Preliminary result)    Specimen: Peripheral Blood   Result Value Ref Range    Culture No growth after 2 days    Blood Culture Hand, Right     Status: Abnormal (Preliminary result)    Specimen: Hand, Right; Blood   Result Value Ref Range    Culture Positive on the 1st day of incubation (A)     Culture Gram positive cocci in clusters (AA)    Verigene GP Panel     Status: Abnormal    Specimen: Hand, Right; Blood   Result Value Ref Range    Staphylococcus aureus Not Detected Not Detected    Staphylococcus epidermidis Detected (A) Not Detected    Staphylococcus lugdunensis Not Detected Not Detected    Enterococcus faecalis Not Detected Not Detected    Enterococcus faecium Not Detected Not Detected    Streptococcus species Not Detected Not Detected    Streptococcus agalactiae Not Detected Not Detected    Streptococcus anginosus group Not Detected Not Detected    Streptococcus pneumoniae Not Detected Not Detected    Streptococcus pyogenes Not Detected Not Detected    Listeria species Not Detected Not Detected    Narrative    Specimen tested with Ozmotaigene multiplex, gram-positive blood culture nucleic acid test for the following targets: Staphylococcus aureus, Staphylococcus epidermidis, Staphylococcus lugdunensis, other Staphylococcus species, Enterococcus faecalis, Enterococcus faecium, Streptococcus species, Streptococcus agalactiae, Streptococcus anginosus group, Streptococcus pneumoniae, Streptococcus pyogenes, Listeria species, mecA (methicillin resistance), and Brittni/vanB (vancomycin resistance).   Blood Culture Hand, Left     Status:  Normal (Preliminary result)    Specimen: Hand, Left; Blood   Result Value Ref Range    Culture No growth after 12 hours    CBC + differential     Status: Abnormal    Narrative    The following orders were created for panel order CBC + differential.  Procedure                               Abnormality         Status                     ---------                               -----------         ------                     CBC with platelets and d...[[id number]]  Abnormal            Final result                 Please view results for these tests on the individual orders.

## 2022-07-26 NOTE — PLAN OF CARE
"Goal Outcome Evaluation:        Pt  yelled to nurse assistance, said :\"you get out\" .She agreed that  a nurse could help her,  change her external cathter, check VS and BG.               "

## 2022-07-26 NOTE — PHARMACY-VANCOMYCIN DOSING SERVICE
"Pharmacy Vancomycin Note  Date of Service 2022  Patient's  [id number]   89 year old, female    Indication: Skin and Soft Tissue Infection  Day of Therapy: 3  Current vancomycin regimen:  1000 mg IV q24h  Current vancomycin monitoring method: AUC  Current vancomycin therapeutic monitoring goal: 400-600 mg*h/L    InsightRX Prediction of Current Vancomycin Regimen  Loading dose: N/A  Regimen: 1000 mg IV every 24 hours.  Start time: 15:20 on 2022  Exposure target: AUC24 (range)400-600 mg/L.hr   AUC24,ss: 322 mg/L.hr  Probability of AUC24 > 400: 15 %  Ctrough,ss: 8.4 mg/L  Probability of Ctrough,ss > 20: 0 %  Probability of nephrotoxicity (Lodise JERO ): 5 %    Current estimated CrCl = Estimated Creatinine Clearance: 66.8 mL/min (based on SCr of 0.57 mg/dL).    Creatinine for last 3 days  2022: 12:04 PM Creatinine 0.85 mg/dL  2022:  6:32 AM Creatinine 0.58 mg/dL  2022:  6:10 AM Creatinine 0.57 mg/dL    Recent Vancomycin Levels (past 3 days)  2022: 12:25 PM Vancomycin 7.2 ug/mL    Vancomycin IV Administrations (past 72 hours)                   vancomycin (VANCOCIN) 1000 mg in dextrose 5% 200 mL PREMIX (mg) 1,000 mg New Bag 22 1504    vancomycin 1250 mg in 0.9% NaCl 250 mL intermittent infusion 1,250 mg (mg) 1,250 mg New Bag 22 1349                Nephrotoxins and other renal medications (From now, onward)    Start     Dose/Rate Route Frequency Ordered Stop    22 0230  vancomycin (VANCOCIN) 750 mg in sodium chloride 0.9 % 250 mL intermittent infusion        \"Followed by\" Linked Group Details    750 mg  over 60-90 Minutes Intravenous EVERY 12 HOURS 22 1429      22 1430  vancomycin (VANCOCIN) 1000 mg in dextrose 5% 200 mL PREMIX        \"Followed by\" Linked Group Details    1,000 mg  200 mL/hr over 1 Hours Intravenous ONCE 22 1429               Contrast Orders - past 72 hours (72h ago, onward)    Start     Dose/Rate Route Frequency Stop    22 " 0400  gadobutrol (GADAVIST) injection 7.5 mL  Status:  Discontinued         7.5 mL Intravenous ONCE 07/25/22 0431          Interpretation of levels and current regimen:  Vancomycin level is reflective of AUC less than 400    Has serum creatinine changed greater than 50% in last 72 hours: No    Urine output:  good urine output    Renal Function: Stable    InsightRX Prediction of Planned New Vancomycin Regimen  Loading dose: N/A  Regimen: 750 mg IV every 12 hours.  Start time: 15:20 on 07/26/2022  Exposure target: AUC24 (range)400-600 mg/L.hr   AUC24,ss: 473 mg/L.hr  Probability of AUC24 > 400: 80 %  Ctrough,ss: 15.2 mg/L  Probability of Ctrough,ss > 20: 13 %  Probability of nephrotoxicity (Lodise JERO 2009): 10 %      Plan:  1. Increase Dose to 750 mg IV q12h  2. Vancomycin monitoring method: AUC  3. Vancomycin therapeutic monitoring goal: 400-600 mg*h/L  4. Pharmacy will check vancomycin levels as appropriate in 1-3 Days.  5. Serum creatinine levels will be ordered daily for the first week of therapy and at least twice weekly for subsequent weeks.    Carol Deal [facility]

## 2022-07-27 ENCOUNTER — HOME INFUSION (PRE-WILLOW HOME INFUSION) (OUTPATIENT)
Dept: PHARMACY | Facility: CLINIC | Age: 87
End: 2022-07-27

## 2022-07-27 LAB
ANION GAP SERPL CALCULATED.3IONS-SCNC: 9 MMOL/L (ref 7–15)
BACTERIA BLD CULT: ABNORMAL
BUN SERPL-MCNC: 8.6 MG/DL (ref 8–23)
CALCIUM SERPL-MCNC: 8.3 MG/DL (ref 8.2–9.6)
CHLORIDE SERPL-SCNC: 97 MMOL/L (ref 98–107)
CREAT SERPL-MCNC: 0.7 MG/DL (ref 0.51–0.95)
DEPRECATED HCO3 PLAS-SCNC: 26 MMOL/L (ref 22–29)
ERYTHROCYTE [DISTWIDTH] IN BLOOD BY AUTOMATED COUNT: 14.7 % (ref 10–15)
GFR SERPL CREATININE-BSD FRML MDRD: 81 ML/MIN/1.73M2
GLUCOSE BLDC GLUCOMTR-MCNC: 241 MG/DL (ref 70–99)
GLUCOSE BLDC GLUCOMTR-MCNC: 242 MG/DL (ref 70–99)
GLUCOSE BLDC GLUCOMTR-MCNC: 247 MG/DL (ref 70–99)
GLUCOSE BLDC GLUCOMTR-MCNC: 270 MG/DL (ref 70–99)
GLUCOSE BLDC GLUCOMTR-MCNC: 275 MG/DL (ref 70–99)
GLUCOSE SERPL-MCNC: 236 MG/DL (ref 70–99)
HCT VFR BLD AUTO: 37.9 % (ref 35–47)
HGB BLD-MCNC: 11.7 G/DL (ref 11.7–15.7)
MCH RBC QN AUTO: 26.7 PG (ref 26.5–33)
MCHC RBC AUTO-ENTMCNC: 30.9 G/DL (ref 31.5–36.5)
MCV RBC AUTO: 86 FL (ref 78–100)
PLATELET # BLD AUTO: 165 10E3/UL (ref 150–450)
POTASSIUM SERPL-SCNC: 3.9 MMOL/L (ref 3.4–5.3)
RBC # BLD AUTO: 4.39 10E6/UL (ref 3.8–5.2)
SODIUM SERPL-SCNC: 132 MMOL/L (ref 136–145)
WBC # BLD AUTO: 17.8 10E3/UL (ref 4–11)

## 2022-07-27 PROCEDURE — 250N000011 HC RX IP 250 OP 636

## 2022-07-27 PROCEDURE — 258N000003 HC RX IP 258 OP 636

## 2022-07-27 PROCEDURE — 99207 PR NON-BILLABLE SERV PER CHARTING: CPT | Performed by: SURGERY

## 2022-07-27 PROCEDURE — 80048 BASIC METABOLIC PNL TOTAL CA: CPT | Performed by: INTERNAL MEDICINE

## 2022-07-27 PROCEDURE — 36415 COLL VENOUS BLD VENIPUNCTURE: CPT | Performed by: INTERNAL MEDICINE

## 2022-07-27 PROCEDURE — 99223 1ST HOSP IP/OBS HIGH 75: CPT | Performed by: INTERNAL MEDICINE

## 2022-07-27 PROCEDURE — 250N000011 HC RX IP 250 OP 636: Performed by: STUDENT IN AN ORGANIZED HEALTH CARE EDUCATION/TRAINING PROGRAM

## 2022-07-27 PROCEDURE — 85027 COMPLETE CBC AUTOMATED: CPT | Performed by: INTERNAL MEDICINE

## 2022-07-27 PROCEDURE — 120N000001 HC R&B MED SURG/OB

## 2022-07-27 PROCEDURE — 99232 SBSQ HOSP IP/OBS MODERATE 35: CPT | Performed by: INTERNAL MEDICINE

## 2022-07-27 PROCEDURE — 250N000013 HC RX MED GY IP 250 OP 250 PS 637: Performed by: STUDENT IN AN ORGANIZED HEALTH CARE EDUCATION/TRAINING PROGRAM

## 2022-07-27 RX ADMIN — CLINDAMYCIN PHOSPHATE 900 MG: 900 INJECTION, SOLUTION INTRAVENOUS at 04:57

## 2022-07-27 RX ADMIN — PANTOPRAZOLE SODIUM 20 MG: 20 TABLET, DELAYED RELEASE ORAL at 09:39

## 2022-07-27 RX ADMIN — MICONAZOLE NITRATE: 2 POWDER TOPICAL at 21:26

## 2022-07-27 RX ADMIN — Medication 2000 UNITS: at 09:38

## 2022-07-27 RX ADMIN — INSULIN GLARGINE 5 UNITS: 100 INJECTION, SOLUTION SUBCUTANEOUS at 09:09

## 2022-07-27 RX ADMIN — CLINDAMYCIN PHOSPHATE 900 MG: 900 INJECTION, SOLUTION INTRAVENOUS at 13:14

## 2022-07-27 RX ADMIN — MICONAZOLE NITRATE: 2 POWDER TOPICAL at 09:31

## 2022-07-27 RX ADMIN — ESCITALOPRAM OXALATE 10 MG: 10 TABLET ORAL at 09:38

## 2022-07-27 RX ADMIN — CLINDAMYCIN PHOSPHATE 900 MG: 900 INJECTION, SOLUTION INTRAVENOUS at 21:26

## 2022-07-27 RX ADMIN — VANCOMYCIN HYDROCHLORIDE 750 MG: 1 INJECTION, POWDER, LYOPHILIZED, FOR SOLUTION INTRAVENOUS at 13:47

## 2022-07-27 RX ADMIN — CEFTRIAXONE 1 G: 1 INJECTION, POWDER, FOR SOLUTION INTRAMUSCULAR; INTRAVENOUS at 12:33

## 2022-07-27 RX ADMIN — VANCOMYCIN HYDROCHLORIDE 750 MG: 1 INJECTION, POWDER, LYOPHILIZED, FOR SOLUTION INTRAVENOUS at 02:40

## 2022-07-27 ASSESSMENT — ACTIVITIES OF DAILY LIVING (ADL)
ADLS_ACUITY_SCORE: 51
ADLS_ACUITY_SCORE: 47
ADLS_ACUITY_SCORE: 47
ADLS_ACUITY_SCORE: 51
ADLS_ACUITY_SCORE: 47
ADLS_ACUITY_SCORE: 49
ADLS_ACUITY_SCORE: 47
ADLS_ACUITY_SCORE: 47
ADLS_ACUITY_SCORE: 51
ADLS_ACUITY_SCORE: 51

## 2022-07-27 NOTE — CONSULTS
ID consult dictated IMP 1 89 yo female PVD/DM R great toe ulcer and cellulitis, not clr deep infection, dementia pain no sepsis    REc Agree with vascular not life threatening, cellulitis mostly resolved , not clr deep infection , if is ABX alone no value  I think OK ceftin 250 bid and clinda 300 tid for 10 days, the main co pain unlikely solved by ABX  BC contaminant no need vanco

## 2022-07-27 NOTE — PROGRESS NOTES
Therapy: IV ABX  Insurance: [employer]    Patient has coverage for IV ABX with their [employer] plan at 100%.    [facility] in reference to admission date 07/24/202 to check IV ABX coverage    Please contact Intake with any questions, [phone] or In Basket pool, [facility] ([phone]).

## 2022-07-27 NOTE — PROGRESS NOTES
[facility]    Hospitalist Progress Note  Name: Mitali Richardson    MRN: [id number]  Provider:  Moises Abdalla DO  Date of Service: 07/27/2022    Summary of Stay: Mitali Richardson is a 89 year old female with a history of type 2 diabetes mellitus, COPD, dementia admitted on 7/24/2022 with toe pain.  Patient is a poor historian so much of the history was obtained through the emergency department provider as well as the son at bedside.  In the emergency department, the patient was found to have a blood pressure of 117/79, heart rate 87, temperature 98.2  F, respiratory rate 16, SPO2 98% on room air.  Initial lab work showed mild leukocytosis of 12.6.  X-ray of the right great toe showed no definite evidence of osteomyelitis, moderate degenerative changes noted.  The patient was started on vancomycin, clindamycin, and IV ceftriaxone.  Podiatry was consulted to see the patient.  Podiatry recommended against surgical intervention at this point, however did recommend vascular surgery evaluation and ABIs.  Arterial duplex suggested aortoiliac occlusive disease along with tibial level occlusive disease that are not adequate for healing.  Vascular surgery recommended aspirin 81 mg, statin with Lipitor 40 mg, antibiotics, and palliative care evaluation given the occlusive disease in her lower extremity and unlikelihood of healing of her lower extremity wound.      TODAY'S PLAN:  Appreciate Podiatry, Vascular Surgery, and Palliative Care recommendations.  Continue Clindamycin and Ceftriaxone.  Consult ID for abx guidance and appreciate recommendations.  Plan initially was for home with hospice.  Discussed with pt's son Juan Ramon via phone.  After his discussion with Vascular Surgery has decided against home with hospice.  ?Need for IV abx.  Discussed the possibility of outpatient palliative care with the son, who expressed interest.  Pt with comorbidities like diabetes and now diffuse atheromatous disease in RLE  including occlusion of her anterior tibial and posterior tibial arteries complicating treatment and risk factors for further infections.  Anticipate pt will be stable for discharge once abx recommendations made and arranged.  Appreciate  assistance with discharge planning.  Checking UA due to elevated WBC today.  Monitor for diarrhea.    Problem List:   Toe pain  Right Toe Cellulitis  Peripheral Vascular Disease  3 days of right great toe wound now with erythema that is tracking up into the foot along with increased pain, and warmth.  Wound likely related to type 2 diabetes, related polyneuropathy.  Overall concern for diabetic ulcer that has now progressed to right great toe and right foot cellulitis.  Initiated on clinda, vancomycin, ceftriaxone in the emergency department.  - Podiatry consultation  - Continue vanc/ceftri/clinda (given abx allergies).  Vancomycin discontinued on 7/27 due to negative MRSA swab  - ABDELRAHMAN showed aortoiliac occlusive disease along with tibial level occlusive disease  - Vascular Surgery recommendations appreciated - recommended ASA 81 mg daily, Lipitor 40 mg at bedtime, Abx, and Palliative Care assessment  - Appreciate Vascular Surgery recommendations  - Tylenol for pain  - Appreciate Palliative Care recommendations     1/2 Blood Cultures Positive for Staph Epidermidis  - Likely contaminant  - Repeat blood culture NTD     T2DM  Appears to take Qtern, Amaryl, Lantus 12 units every morning at baseline.  - Increase Lantus to 6 units every morning for now  - MDSSI     COPD  - No evidence of exacerbation.  Continue PTA inhalers once med rec complete     Dementia  - Unreliable historian.  Delirium precautions while here    DVT Prophylaxis: Pneumatic Compression Devices  Code Status: No CPR- Do NOT Intubate  Diet: Moderate Consistent Carb (60 g CHO per Meal) Diet    Zhang Catheter: Not present  Disposition: Expected discharge in 1-2 days to LTC. Goals prior to discharge include abx plan  established.   Family updated today: Yes      Interval History   Pt seen and examined.  Pt sleeping on arrival.  Pt easily arousable.  Denies pain.      -Data reviewed today: I personally reviewed all new labs and imaging results over the last 24 hours.     Physical Exam   Temp: 98.6  F (37  C) Temp src: Temporal BP: 110/42 Pulse: 69   Resp: (!) 32 SpO2: 92 % O2 Device: None (Room air) Oxygen Delivery: 2 LPM  Vitals:    07/24/22 1256   Weight: 65.8 kg (145 lb)     Vital Signs with Ranges  Temp:  [97  F (36.1  C)-98.6  F (37  C)] 98.6  F (37  C)  Pulse:  [67-74] 69  Resp:  [14-32] 32  BP: (110-145)/(42-60) 110/42  SpO2:  [86 %-95 %] 92 %  I/O last 3 completed shifts:  In: 715 [P.O.:600; I.V.:115]  Out: 1725 [Urine:1725]    GENERAL: No apparent distress. Awake, alert  HEENT: Normocephalic, atraumatic. Extraocular movements intact.  CARDIOVASCULAR: Regular rate and rhythm without murmurs or rubs. No S3.  PULMONARY: Clear bilaterally.  GASTROINTESTINAL: Soft, non-tender, non-distended. Bowel sounds normoactive.   EXTREMITIES: No cyanosis or clubbing. No edema.  NEUROLOGICAL: CN 2-12 grossly intact, no focal neurological deficits.  DERMATOLOGICAL: No rash, ulcer, bruising, nor jaundice.    Medications       cefTRIAXone  1 g Intravenous Q24H     clindamycin  900 mg Intravenous Q8H     escitalopram  10 mg Oral Daily     insulin aspart  1-6 Units Subcutaneous TID w/meals     insulin glargine  5 Units Subcutaneous QAM AC     miconazole   Topical BID     pantoprazole  20 mg Oral Daily     sodium chloride (PF)  3 mL Intracatheter Q8H     vancomycin  750 mg Intravenous Q12H     Vitamin D3  2,000 Units Oral Daily     Data     Laboratory:  Recent Labs   Lab 07/27/22  0706 07/26/22  0610 07/25/22  0632   WBC 17.8* 9.8 9.1   HGB 11.7 12.4 11.3*   HCT 37.9 40.9 37.4   MCV 86 87 87    185 172     Recent Labs   Lab 07/27/22  1229 07/27/22  0747 07/27/22  0706 07/26/22  0825 07/26/22  0610 07/25/22  2150 07/25/22  1306  07/25/22  0922 07/25/22  0632   NA  --   --  132*  --  132*  --   --   --  133   POTASSIUM  --   --  3.9  --  3.8  --  3.9  --  3.2*   CHLORIDE  --   --  97*  --  102  --   --   --  101   CO2  --   --  26  --  23  --   --   --  25   ANIONGAP  --   --  9  --  7  --   --   --  7   * 247* 236*   < > 170*   < >  --    < > 116*   BUN  --   --  8.6  --  13  --   --   --  20   CR  --   --  0.70  --  0.57  --   --   --  0.58   GFRESTIMATED  --   --  81  --  85  --   --   --  85   ALONSO  --   --  8.3  --  8.8  --   --   --  8.1*    < > = values in this interval not displayed.     No results for input(s): CULT in the last 168 hours.    Imaging:  No results found for this or any previous visit (from the past 24 hour(s)).      Moises Abdalla DO  [facility] Hospitalist  [address] [place] [address]  07/27/2022

## 2022-07-27 NOTE — PROGRESS NOTES
[facility]    Received request for benefit check should the pt discharge to home; however, it appears the pt will discharge to a facility. The benefits detailed below is informational only and have not been discussed with pt or family.     Patient has coverage for IV ABX with their [employer] plan at 100%.    Thank you     Alicia Figueroa RN  [facility] Liaison  [phone] (Mon thru Fri 8am - 5pm)  [phone] Office

## 2022-07-27 NOTE — PLAN OF CARE
Goal Outcome Evaluation:  Vital signs stable.  Pt disoriented to place, time, situation.  Denies pain.  On iv rocephin, cleocin,vanco.  Tylenol taken.  Dressing intact to right great toe.Right foot still swollen reddened.  Blood glucose monitored.  Pt;s family met with pain and palliative APRN.  Plan of Care Reviewed With: patient, son

## 2022-07-27 NOTE — PROGRESS NOTES
"SPIRITUAL HEALTH SERVICES Progress Note  [facility] Ortho/[facility]    Responded to a [facility] consult; pt's daughter Saima requested emotional support for pt.  Pt was sleeping this afternoon.  Consulted RN, who reported that pt has been lethargic today.    Called Saima and oriented her to [facility].  Saima reported that pt's Yazidi candy is important to her.  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  She reported that she plans to see pt tomorrow, late in the morning.    Plan: Will follow up tomorrow 7/28/2022 to assess pt's emotional/spiritual needs.    Izaiah Reed M.Div., [facility]  Staff   Phone [phone]    "

## 2022-07-27 NOTE — PROGRESS NOTES
Virtual Vascular Surgery    Talked to patient's son and clarified that the patient does not have wet gangrene nor will this be taking her life.  The palliative care consult was to discuss overall goals with regard to the patient's care moving forward.  She does not have wet gangrene of her foot or toe.  She has an ischemic ulceration that may become eschar which is dry gangrene. This is not life threatening and likely was confused with wet gangrene in discussion with family.  This will not develop into wet gangrene and thus any conversations regarding life expectancy would not be revolving around her foot.  Daily Betadine paint to her right great toe and a Kerlex wrap can easily be accomplished in what ever facility she goes to but should certainly not be a source or reason for her to go to hospice.     Of note, the patient's white blood cell count is elevated today.  Would consider getting a UA.  Also would consider an oral route as opposed to IV antibiotics given the significant challenges at times with this.    Will sign off. Please call if questions.    Kathy Hinton MD, DFSVS, RPVI  Director, [facility] Vascular Services  Chief, Vascular and Endovascular Surgery  [facility]  [email or web address]  Pager: 1. Send message or 10 digit call back number Securely via [facility] with the [facility] Web Console (learn more here)              2. Outside of [facility]? Call [phone]

## 2022-07-27 NOTE — PLAN OF CARE
Patient alert to self, disoriented to time place and situation. Vs stable. Denied pain, Dressing on right great toe CDI. Continues on IV vancomycin and cleocin, and Rocephin.   Discharge planning still in progress.

## 2022-07-27 NOTE — PLAN OF CARE
Goal Outcome Evaluation:    Plan of Care Reviewed With: patient     Overall Patient Progress: no change     Afeb, vss, lethargic today, needing frequent stimulation to stay awake this morning, better this afternoon. Vascular surgeon placed a note that pt's toe is not infected but just poor circulation related. Red streak much diminished on top of her right foot but the toe itself still looks bad, dressing changed this am. WBC 17.8 this am. IV abxs continue, Pure wick in for urine output, need to obtain a UA. Up to the recliner with mod assist of 2. Planning toward discharge tomorrow on po abx.  Infectious disease consult placed and completed this afternoon.

## 2022-07-27 NOTE — PLAN OF CARE
Goal Outcome Evaluation:    Plan of Care Reviewed With: patient     Overall Patient Progress: improving     Pt disoriented to time, place, situation, more alert this evening, sitting up in chair, eating supper. Speech clear, hard of hearing, hearing aids in place. Right great toe covered with dry Rebeca, changed today. Possible discharge tomorrow with po abx. Will continue to monitor.

## 2022-07-27 NOTE — CONSULTS
Care Management Initial Consult    General Information  Assessment completed with: Children, Son Juan Ramon  Type of CM/SW Visit: Initial Assessment    Primary Care Provider verified and updated as needed:     Readmission within the last 30 days:           Advance Care Planning:            Communication Assessment  Patient's communication style: spoken language (English or Bilingual)             Cognitive  Cognitive/Neuro/Behavioral: .WDL except, orientation  Level of Consciousness: confused  Arousal Level: opens eyes spontaneously  Orientation: disoriented to, place, time, situation  Mood/Behavior: anxious  Best Language: 0 - No aphasia  Speech: clear    Living Environment:   People in home:       Current living Arrangements:        Able to return to prior arrangements:         Family/Social Support:  Care provided by:    Provides care for:                  Description of Support System:           Current Resources:   Patient receiving home care services:       Community Resources:    Equipment currently used at home:    Supplies currently used at home:      Employment/Financial:  Employment Status:          Financial Concerns:             Lifestyle & Psychosocial Needs:  Social Determinants of Health     Tobacco Use: Medium Risk     Smoking Tobacco Use: Former Smoker     Smokeless Tobacco Use: Never Used   Alcohol Use: Not on file   Financial Resource Strain: Not on file   Food Insecurity: Not on file   Transportation Needs: Not on file   Physical Activity: Not on file   Stress: Not on file   Social Connections: Not on file   Intimate Partner Violence: Not on file   Depression: Not on file   Housing Stability: Not on file       Functional Status:  Prior to admission patient needed assistance:              Mental Health Status:          Chemical Dependency Status:                Values/Beliefs:  Spiritual, Cultural Beliefs, Zoroastrianism Practices, Values that affect care:                 Additional Information:    Briefly  spoke with pt son Juan Ramon over the phone as he had to return to work. Juan Ramon stated that pt has an elderly waver and would like her to go to [facility] and enroll on hospice there. This writer asked if a referral could be sent to [facility] in which he confirmed. This writer sent a referral to [facility]. SW following.    Addendum    Received call from [facility] ([phone]) stating that pt received homecare through them and informed this writer that they have hospice services as this could be an option for pt.     Addendum    Spoke with pt son Juan Ramon who provided referral options for LTC. This writer sent referrals to LTC, pt would like private room with private bathroom if there is not additional costs beyond elderly waver coverage.     KRISTY Stevens, [facility]  Inpatient Care Coordination  Ortho/Spine Unit  [phone]  Ana M Santiago, [facility]

## 2022-07-28 VITALS
OXYGEN SATURATION: 95 % | RESPIRATION RATE: 20 BRPM | WEIGHT: 145 LBS | BODY MASS INDEX: 29.29 KG/M2 | TEMPERATURE: 97.7 F | SYSTOLIC BLOOD PRESSURE: 114 MMHG | DIASTOLIC BLOOD PRESSURE: 56 MMHG | HEART RATE: 70 BPM

## 2022-07-28 LAB
ANION GAP SERPL CALCULATED.3IONS-SCNC: 10 MMOL/L (ref 7–15)
BUN SERPL-MCNC: 15.2 MG/DL (ref 8–23)
CALCIUM SERPL-MCNC: 8.1 MG/DL (ref 8.2–9.6)
CHLORIDE SERPL-SCNC: 96 MMOL/L (ref 98–107)
CREAT SERPL-MCNC: 0.76 MG/DL (ref 0.51–0.95)
DEPRECATED HCO3 PLAS-SCNC: 25 MMOL/L (ref 22–29)
ERYTHROCYTE [DISTWIDTH] IN BLOOD BY AUTOMATED COUNT: 15 % (ref 10–15)
GFR SERPL CREATININE-BSD FRML MDRD: 74 ML/MIN/1.73M2
GLUCOSE BLDC GLUCOMTR-MCNC: 194 MG/DL (ref 70–99)
GLUCOSE BLDC GLUCOMTR-MCNC: 231 MG/DL (ref 70–99)
GLUCOSE SERPL-MCNC: 199 MG/DL (ref 70–99)
HCT VFR BLD AUTO: 36 % (ref 35–47)
HGB BLD-MCNC: 11.1 G/DL (ref 11.7–15.7)
MCH RBC QN AUTO: 26.5 PG (ref 26.5–33)
MCHC RBC AUTO-ENTMCNC: 30.8 G/DL (ref 31.5–36.5)
MCV RBC AUTO: 86 FL (ref 78–100)
PHOSPHATE SERPL-MCNC: 3 MG/DL (ref 2.5–4.5)
PLATELET # BLD AUTO: 207 10E3/UL (ref 150–450)
POTASSIUM SERPL-SCNC: 3.9 MMOL/L (ref 3.4–5.3)
RBC # BLD AUTO: 4.19 10E6/UL (ref 3.8–5.2)
SODIUM SERPL-SCNC: 131 MMOL/L (ref 136–145)
WBC # BLD AUTO: 11.8 10E3/UL (ref 4–11)

## 2022-07-28 PROCEDURE — 84100 ASSAY OF PHOSPHORUS: CPT | Performed by: STUDENT IN AN ORGANIZED HEALTH CARE EDUCATION/TRAINING PROGRAM

## 2022-07-28 PROCEDURE — 82310 ASSAY OF CALCIUM: CPT | Performed by: INTERNAL MEDICINE

## 2022-07-28 PROCEDURE — 99239 HOSP IP/OBS DSCHRG MGMT >30: CPT | Performed by: INTERNAL MEDICINE

## 2022-07-28 PROCEDURE — 250N000013 HC RX MED GY IP 250 OP 250 PS 637: Performed by: STUDENT IN AN ORGANIZED HEALTH CARE EDUCATION/TRAINING PROGRAM

## 2022-07-28 PROCEDURE — 36415 COLL VENOUS BLD VENIPUNCTURE: CPT | Performed by: INTERNAL MEDICINE

## 2022-07-28 PROCEDURE — 99232 SBSQ HOSP IP/OBS MODERATE 35: CPT | Performed by: INTERNAL MEDICINE

## 2022-07-28 PROCEDURE — 250N000011 HC RX IP 250 OP 636: Performed by: STUDENT IN AN ORGANIZED HEALTH CARE EDUCATION/TRAINING PROGRAM

## 2022-07-28 PROCEDURE — 85027 COMPLETE CBC AUTOMATED: CPT | Performed by: INTERNAL MEDICINE

## 2022-07-28 RX ORDER — CLINDAMYCIN HCL 300 MG
300 CAPSULE ORAL 3 TIMES DAILY
Qty: 21 CAPSULE | Refills: 0 | Status: SHIPPED | OUTPATIENT
Start: 2022-07-28 | End: 2022-07-28

## 2022-07-28 RX ORDER — ATORVASTATIN CALCIUM 40 MG/1
40 TABLET, FILM COATED ORAL DAILY
Qty: 30 TABLET | Refills: 0 | Status: SHIPPED | OUTPATIENT
Start: 2022-07-28 | End: 2022-07-28

## 2022-07-28 RX ORDER — CEFUROXIME AXETIL 500 MG/1
500 TABLET ORAL 2 TIMES DAILY
Qty: 14 TABLET | Refills: 0 | Status: SHIPPED | OUTPATIENT
Start: 2022-07-28 | End: 2022-07-28

## 2022-07-28 RX ORDER — CLINDAMYCIN HCL 300 MG
300 CAPSULE ORAL 3 TIMES DAILY
Qty: 21 CAPSULE | Refills: 0 | Status: SHIPPED | OUTPATIENT
Start: 2022-07-28 | End: 2023-01-01

## 2022-07-28 RX ORDER — ATORVASTATIN CALCIUM 40 MG/1
40 TABLET, FILM COATED ORAL DAILY
Qty: 30 TABLET | Refills: 0 | Status: SHIPPED | OUTPATIENT
Start: 2022-07-28 | End: 2023-01-01

## 2022-07-28 RX ORDER — CEFUROXIME AXETIL 500 MG/1
500 TABLET ORAL 2 TIMES DAILY
Qty: 14 TABLET | Refills: 0 | Status: SHIPPED | OUTPATIENT
Start: 2022-07-28 | End: 2023-01-01

## 2022-07-28 RX ADMIN — PANTOPRAZOLE SODIUM 20 MG: 20 TABLET, DELAYED RELEASE ORAL at 08:53

## 2022-07-28 RX ADMIN — CLINDAMYCIN PHOSPHATE 900 MG: 900 INJECTION, SOLUTION INTRAVENOUS at 12:42

## 2022-07-28 RX ADMIN — ESCITALOPRAM OXALATE 10 MG: 10 TABLET ORAL at 08:53

## 2022-07-28 RX ADMIN — CLINDAMYCIN PHOSPHATE 900 MG: 900 INJECTION, SOLUTION INTRAVENOUS at 05:08

## 2022-07-28 RX ADMIN — ACETAMINOPHEN 650 MG: 325 TABLET, FILM COATED ORAL at 11:59

## 2022-07-28 RX ADMIN — MICONAZOLE NITRATE: 2 POWDER TOPICAL at 08:58

## 2022-07-28 RX ADMIN — Medication 2000 UNITS: at 08:53

## 2022-07-28 RX ADMIN — CEFTRIAXONE 1 G: 1 INJECTION, POWDER, FOR SOLUTION INTRAMUSCULAR; INTRAVENOUS at 12:04

## 2022-07-28 ASSESSMENT — ACTIVITIES OF DAILY LIVING (ADL)
ADLS_ACUITY_SCORE: 49

## 2022-07-28 NOTE — PROGRESS NOTES
Care Management Discharge Note    Discharge Date: 07/28/2022       Discharge Disposition: Assisted Living    Discharge Services:      Discharge DME: Oxygen    Discharge Transportation:      Private pay costs discussed: Not applicable    PAS Confirmation Code:    Patient/family educated on Medicare website which has current facility and service quality ratings:      Education Provided on the Discharge Plan:    Persons Notified of Discharge Plans: JEFF, RN, pt, son  Patient/Family in Agreement with the Plan: yes    Handoff Referral Completed: No    Additional Information:    Spoke with pt son this morning who would like pt to go back to the [facility] in [place]. Pt will discharge back to [facility] in [place]. This writer called and updated DON at [facility] (P: [phone] F: [phone]) on pt ambulation status in order to ensure they can accept pt back to their facility. DON stated that they can accept pt anytime before 3:30 pm today 7/28 otherwise discharge will have to be tomorrow 7/29. Faxed orders. Spoke with Juan Ramon (son) who stated that a family member is on the way to hospital and can transport her when she is ready. Spoke with [facility] (P: [phone] F: [phone]) who will resume homecare and will add on home PT in addition to RN. Faxed orders. Once pt has home o2 set by RN, she is ready for discharge.    KRISTY Stevens, [facility]  Inpatient Care Coordination  Ortho/Spine Unit  [phone]  Ana M Santiago, [facility]

## 2022-07-28 NOTE — PROGRESS NOTES
Case Management Note    CM updated by hospitalist that patient may need home IV antibiotic infusions. Infectious Disease has been consulted. Sent email to [facility] for benefit check on all current antibiotics for cost. Await results.     RN CC will continue to follow for discharge planning.    Shila Montez RN, BSN, CPHN, CM  Inpatient Care Coordination - [facility]  [phone]    Addendum 7/28: Patient has coverage for IV ABX with their [employer] plan at 100%.  ds

## 2022-07-28 NOTE — PROGRESS NOTES
[facility]  Infectious Disease Progress Note          Assessment and Plan:   IMPRESSION:     1.  A 89-year-old female with acute right great toe and foot cellulitis with right great toe wound, not clear deep infection, significant peripheral vascular disease and underlying diabetes, no surgical intervention or cultures, somewhat clinically improved on broad antibiotics.  2.  Peripheral vascular disease.  3.  Diabetes mellitus.  4.  Dementia.  5.  PENICILLIN, CEPHALOSPORIN, CLARITHROMYCIN, DOXYCYCLINE, SULFA, AND TEQUIN ARE LISTED ALLERGIES.  She has tolerated cephalosporins here currently.  Appears to not be all cephalosporins.     RECOMMENDATIONS:     1.  Unlikely antibiotics are going to be the solution to the pain that has been her main complaint, not life-threatening condition, not obvious deep infection, so no indication for long-term antibiotics, would simply treat short term here, treat with clindamycin and cefuroxime, planning on about a 10-day course treating cellulitis.  2.  Decision regarding level of care, unrelated to this condition, not likely to be life-threatening.  Agree po and disp[osition, if this progresses reconsider options        Interval History:   no new complaints and doing well; no cp, sob, n/v/d, or abd pain. Foot same               Medications:       cefTRIAXone  1 g Intravenous Q24H     clindamycin  900 mg Intravenous Q8H     escitalopram  10 mg Oral Daily     insulin aspart  1-6 Units Subcutaneous TID w/meals     insulin glargine  6 Units Subcutaneous QAM AC     miconazole   Topical BID     pantoprazole  20 mg Oral Daily     sodium chloride (PF)  3 mL Intracatheter Q8H     Vitamin D3  2,000 Units Oral Daily                  Physical Exam:   Blood pressure 114/56, pulse 70, temperature 97.7  F (36.5  C), temperature source Temporal, resp. rate 20, weight 65.8 kg (145 lb), SpO2 95 %, not currently breastfeeding.  Wt Readings from Last 2 Encounters:   07/24/22 65.8  kg (145 lb)   11/06/21 66.2 kg (145 lb 14.4 oz)     Vital Signs with Ranges  Temp:  [97.3  F (36.3  C)-98.3  F (36.8  C)] 97.7  F (36.5  C)  Pulse:  [70-78] 70  Resp:  [20-28] 20  BP: (111-127)/(48-56) 114/56  SpO2:  [90 %-95 %] 95 %    Constitutional: Awake, alert, cooperative, no apparent distress pleasantly confused   Lungs: Clear to auscultation bilaterally, no crackles or wheezing   Cardiovascular: Regular rate and rhythm, normal S1 and S2, and no murmur noted   Abdomen: Normal bowel sounds, soft, non-distended, non-tender   Skin: No rashes, no cyanosis, no edema toe same   Other:           Data:   All microbiology laboratory data reviewed.  Recent Labs   Lab Test 07/28/22  0631 07/27/22  0706 07/26/22  0610   WBC 11.8* 17.8* 9.8   HGB 11.1* 11.7 12.4   HCT 36.0 37.9 40.9   MCV 86 86 87    165 185     Recent Labs   Lab Test 07/28/22  0631 07/27/22  0706 07/26/22  0610   CR 0.76 0.70 0.57     Recent Labs   Lab Test 07/24/22  1204   SED 26     Recent Labs   Lab Test 10/30/15  1428   CULT >100,000 colonies/mL mixed urogenital zbigniew  Susceptibility testing not routinely done

## 2022-07-28 NOTE — PROGRESS NOTES
"SPIRITUAL HEALTH SERVICES Progress Note  [facility] Ortho/Spine Unit    Saw pt Mitali Richardson per family request for emotional support for pt.  Attempted to engage Mitali in light-hearted conversation but her responses were often vague or she would say, \"I don't know.\"      Illness Narrative - Mitali reported that she came to the hospital for \"sickness and problems with my foot.\"  She added, \"they tell me my foot is getting better, I hope they're right.\"      Distress - Mitali shared that she worries about \"people's safety\" but could not tell me who she was thinking about.      Coping - Mitali's face lit up when she reported that she is Sikhism, and she welcomed prayer.      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      Plan - No further plans.  This author and other chaplains remain available per family request.    Izaiah Reed M.Div., [facility]  Staff   Phone [phone]  "

## 2022-07-28 NOTE — PLAN OF CARE
Goal Outcome Evaluation:           Pt is alert  only self, Confused. Pt was cooperative. Has dressing to R/ great toe wound, intact, no drainge. Periferal IV site intact, SL.Pt has External cathter on. Pt has pressure injury to R/ buttock, stage-I. Blanchable redness around rectum,barrier cream applied and pt was repositioned.  Mepilex to sacrum area.Pt is A-2 with gate belt and walker, pt uses bed commode. Pt will discharge to TCU today. Nursing will continue monitoring.

## 2022-07-28 NOTE — PROGRESS NOTES
[facility]    Hospitalist Progress Note  Name: Mitali Richardson    MRN: [id number]  Provider:  Moises Abdalla DO  Date of Service: 07/28/2022    Summary of Stay: Mitali Richardson is a 89 year old female with a history of type 2 diabetes mellitus, COPD, dementia admitted on 7/24/2022 with toe pain.  Patient is a poor historian so much of the history was obtained through the emergency department provider as well as the son at bedside.  In the emergency department, the patient was found to have a blood pressure of 117/79, heart rate 87, temperature 98.2  F, respiratory rate 16, SPO2 98% on room air.  Initial lab work showed mild leukocytosis of 12.6.  X-ray of the right great toe showed no definite evidence of osteomyelitis, moderate degenerative changes noted.  The patient was started on vancomycin, clindamycin, and IV ceftriaxone.  Podiatry was consulted to see the patient.  Podiatry recommended against surgical intervention at this point, however did recommend vascular surgery evaluation and ABIs.  Arterial duplex suggested aortoiliac occlusive disease along with tibial level occlusive disease that are not adequate for healing.  Vascular surgery recommended aspirin 81 mg, statin with Lipitor 40 mg, antibiotics, and palliative care evaluation.  Palliative care was consulted to see the patient and initially the patient's son elected for DNR/DNI CODE STATUS and to pursue home with hospice.  Upon further discussions the patient's son decided against hospice.  Infectious disease was consulted to see the patient and recommended 10 days of antibiotics with cefuroxime and clindamycin.  On 7/28/2022, the patient was medically stable for discharge.    TODAY'S PLAN: Appreciate infectious disease recommendations.  At this point the patient is medically stable for discharge back to her facility.  Updated son Juan Ramon via phone.  All questions were answered.    Problem List:   Toe pain  Right Toe  Cellulitis  Peripheral Vascular Disease  3 days of right great toe wound now with erythema that is tracking up into the foot along with increased pain, and warmth.  Wound likely related to type 2 diabetes, related polyneuropathy.  Overall concern for diabetic ulcer that has now progressed to right great toe and right foot cellulitis.  Initiated on clinda, vancomycin, ceftriaxone in the emergency department.  - Podiatry consultation  - Continue vanc/ceftri/clinda (given abx allergies).  Vancomycin discontinued on 7/27 due to negative MRSA swab  - ABDELRAHMAN showed aortoiliac occlusive disease along with tibial level occlusive disease  - Vascular Surgery recommendations appreciated - recommended ASA 81 mg daily, Lipitor 40 mg at bedtime, Abx, and Palliative Care assessment  - Appreciate Vascular Surgery recommendations  - Tylenol for pain  - Appreciate Palliative Care recommendations  - Appreciate ID recommendations     1/2 Blood Cultures Positive for Staph Epidermidis  - Likely contaminant  - Repeat blood culture NTD    Acute Hypoxic Respiratory Failure  JOCELYNN, Without CPAP  - Likely secondary to immobility and probable atelectasis  - Wean O2 as able  - Noncompliant with CPAP and has periods of hypoxia at night     T2DM  Appears to take Qtern, Amaryl, Lantus 12 units every morning at baseline.  - Increase Lantus to 6 units every morning for now  - MDSSI     COPD  - No evidence of exacerbation.  Continue PTA inhalers once med rec complete     Dementia  - Unreliable historian.  Delirium precautions while here    Mild Hypokalemia  - Electrolyte replacement protocol    DVT Prophylaxis: Pneumatic Compression Devices  Code Status: No CPR- Do NOT Intubate  Diet: Moderate Consistent Carb (60 g CHO per Meal) Diet    Zhang Catheter: Not present  Disposition: Expected discharge today to home. Goals prior to discharge include abx plan established.   Family updated today: Yes      Interval History   Pt seen and examined.  Pt sitting in  chair.  Denies any pain.  Became emotional during discussion regarding discharge.    -Data reviewed today: I personally reviewed all new labs and imaging results over the last 24 hours.     Physical Exam   Temp: 97.7  F (36.5  C) Temp src: Temporal BP: 114/56 Pulse: 70   Resp: 20 SpO2: 95 % O2 Device: Nasal cannula Oxygen Delivery: 2 LPM  Vitals:    07/24/22 1256   Weight: 65.8 kg (145 lb)     Vital Signs with Ranges  Temp:  [97.3  F (36.3  C)-98.6  F (37  C)] 97.7  F (36.5  C)  Pulse:  [69-78] 70  Resp:  [20-28] 20  BP: (110-127)/(42-56) 114/56  SpO2:  [90 %-95 %] 95 %  I/O last 3 completed shifts:  In: 914 [P.O.:480; I.V.:434]  Out: 600 [Urine:600]    GENERAL: No apparent distress. Awake, alert  HEENT: Normocephalic, atraumatic. Extraocular movements intact.  CARDIOVASCULAR: Regular rate and rhythm without murmurs or rubs. No S3.  PULMONARY: Clear bilaterally.  GASTROINTESTINAL: Soft, non-tender, non-distended. Bowel sounds normoactive.   EXTREMITIES: No cyanosis or clubbing. No edema.  NEUROLOGICAL: CN 2-12 grossly intact, no focal neurological deficits.  DERMATOLOGICAL: No rash, ulcer, bruising, nor jaundice.    Medications       cefTRIAXone  1 g Intravenous Q24H     clindamycin  900 mg Intravenous Q8H     escitalopram  10 mg Oral Daily     insulin aspart  1-6 Units Subcutaneous TID w/meals     insulin glargine  6 Units Subcutaneous QAM AC     miconazole   Topical BID     pantoprazole  20 mg Oral Daily     sodium chloride (PF)  3 mL Intracatheter Q8H     Vitamin D3  2,000 Units Oral Daily     Data     Laboratory:  Recent Labs   Lab 07/28/22  0631 07/27/22  0706 07/26/22  0610   WBC 11.8* 17.8* 9.8   HGB 11.1* 11.7 12.4   HCT 36.0 37.9 40.9   MCV 86 86 87    165 185     Recent Labs   Lab 07/28/22  0631 07/28/22  0212 07/27/22  2201 07/27/22  0747 07/27/22  0706 07/26/22  0825 07/26/22  0610   *  --   --   --  132*  --  132*   POTASSIUM 3.9  --   --   --  3.9  --  3.8   CHLORIDE 96*  --   --   --  97*   --  102   CO2 25  --   --   --  26  --  23   ANIONGAP 10  --   --   --  9  --  7   * 194* 275*   < > 236*   < > 170*   BUN 15.2  --   --   --  8.6  --  13   CR 0.76  --   --   --  0.70  --  0.57   GFRESTIMATED 74  --   --   --  81  --  85   ALONSO 8.1*  --   --   --  8.3  --  8.8    < > = values in this interval not displayed.     No results for input(s): CULT in the last 168 hours.    Imaging:  No results found for this or any previous visit (from the past 24 hour(s)).      Moises Abdalla DO  [facility] Hospitalist  [address] [place] [address]  07/28/2022

## 2022-07-28 NOTE — CONSULTS
Consult Date: 07/28/2022    INFECTIOUS DISEASE CONSULTATION    LOCATION:  Room 606, [facility]    REFERRING PHYSICIAN:  Reid Abdalla MD    IMPRESSION:     1.  A 89-year-old female with acute right great toe and foot cellulitis with right great toe wound, not clear deep infection, significant peripheral vascular disease and underlying diabetes, no surgical intervention or cultures, somewhat clinically improved on broad antibiotics.  2.  Peripheral vascular disease.  3.  Diabetes mellitus.  4.  Dementia.  5.  PENICILLIN, CEPHALOSPORIN, CLARITHROMYCIN, DOXYCYCLINE, SULFA, AND TEQUIN ARE LISTED ALLERGIES.  She has tolerated cephalosporins here currently.  Appears to not be all cephalosporins.    RECOMMENDATIONS:     1.  Unlikely antibiotics are going to be the solution to the pain that has been her main complaint, not life-threatening condition, not obvious deep infection, so no indication for long-term antibiotics, would simply treat short term here, treat with clindamycin and cefuroxime, planning on about a 10-day course treating cellulitis.  2.  Decision regarding level of care, unrelated to this condition, not likely to be life-threatening.    HISTORY OF PRESENT ILLNESS:  This 89-year-old female is seen in consultation due to a right great toe infection.  The patient has a history of diabetes, dementia, and peripheral vascular disease now identified.  She has had a right great toe ulcer, now with progressive pain and discomfort.  She has not had major clinical sepsis.  Has a blood culture positive for coagulase-negative Staph that is almost certainly a contaminant.  No foot cultures or foot interventions have been done.  The foot has improved, although still having a fair amount of pain, not fully controlled.  Workup has included no further vascular o podiatry interventions.    PAST MEDICAL HISTORY:  Significant for dementia, history of diabetes with peripheral vascular disease.    ALLERGIES:  NUMEROUS  LISTED ALLERGIES FROM AN ANTIBIOTIC STANDPOINT, not able to clarify with the patient, but HAS LISTED ALLERGIES TO 6 DIFFERENT CLASS OF ANTIBIOTICS.  PENICILLIN IS LISTED AS ANAPHYLACTIC-TYPE REACTION.  THEN CEPHALOSPORINS, but has gotten other cephalosporins, including ceftriaxone currently without reaction.    SULFA WITH APPARENT RASH.  TEQUIN SIDE EFFECT NOT TRUE ALLERGY.   CLARITHROMYCIN CLEAR AND DOXYCYCLINE ALSO UNCLEAR.    REVIEW OF SYSTEMS:  Currently denying particular pain, pleasantly confused, not ill-looking.    PHYSICAL EXAMINATION:    GENERAL:  The patient appears his stated age, mildly confused, fully awake and alert, not toxic or ill-looking.  HEENT:  Unremarkable.  HEART AND LUNGS:  Without significant abnormality.  ABDOMEN:  Soft, nontender.  EXTREMITIES:  Right great toe with ulcer present, not obvious tracking deep.  Some mild erythema still present, slightly warm to the touch.  Maybe vascular changes rather than cellulitis.    LABORATORY DATA:  Creatinine within normal limits.  White blood cell count has been mildly elevated intermittently, 17,800.  Blood cultures grew coagulase-negative Staph, almost certainly contaminant.    Thank you very much for the consultation.  I will follow the patient with you.    Jn Vaughan MD        D: 2022   T: 2022   MT: BRANDO    Name:     JHONATHAN KEMP  MRN:      [id number]        Account:      [id number]   :      1931           Consult Date: 2022     Document: [id number]

## 2022-07-28 NOTE — DISCHARGE SUMMARY
Hospitalist Discharge Summary  [facility]    Mitali Richardson MRN# [id number]   YOB: 1931 Age: 89 year old     Date of Admission:  7/24/2022  Date of Discharge:  7/28/2022  Admitting Physician:  Lev Jones DO  Discharge Physician:  Moises Abdalla DO  Discharging Service:  Hospitalist     Primary Provider: Sherrie Briceno          Discharge Diagnosis:     Toe pain  Right Toe Cellulitis  Peripheral Vascular Disease  3 days of right great toe wound now with erythema that is tracking up into the foot along with increased pain, and warmth.  Wound likely related to type 2 diabetes, related polyneuropathy.  Overall concern for diabetic ulcer that has now progressed to right great toe and right foot cellulitis.  Initiated on clinda, vancomycin, ceftriaxone in the emergency department.  - Podiatry consultation  - Continue vanc/ceftri/clinda (given abx allergies).  Vancomycin discontinued on 7/27 due to negative MRSA swab  - ABDELRAHMAN showed aortoiliac occlusive disease along with tibial level occlusive disease  - Vascular Surgery recommendations appreciated - recommended ASA 81 mg daily, Lipitor 40 mg at bedtime, Abx, and Palliative Care assessment  - Appreciate Vascular Surgery recommendations  - Tylenol for pain  - Appreciate Palliative Care recommendations  - Appreciate ID recommendations     1/2 Blood Cultures Positive for Staph Epidermidis  - Likely contaminant  - Repeat blood culture NTD     Acute Hypoxic Respiratory Failure  JOCELYNN, Without CPAP  - Likely secondary to immobility and probable atelectasis  - Wean O2 as able  - Noncompliant with CPAP and has periods of hypoxia at night     T2DM  Appears to take Qtern, Amaryl, Lantus 12 units every morning at baseline.  - Increase Lantus to 6 units every morning for now  - MDSSI     COPD  - No evidence of exacerbation.  Continue PTA inhalers once med rec complete     Dementia  - Unreliable historian.  Delirium precautions while  here    Mild Hypokalemia  - Electrolyte replacement protocol             Discharge Disposition:     Discharged to assisted living facility with RN and PT           Allergies:     Allergies   Allergen Reactions     Amoxicillin Anaphylaxis and Hives     Penicillins Anaphylaxis and Hives     Pregabalin Other (See Comments)     Other reaction(s): Sedation       Ceclor  [Cefaclor]      Clarithromycin      Codeine      Other reaction(s): Chest Pain     Doxycycline Hives     Floxin [Ofloxacin]      Has tolerated cipro in the past     Glyburide      Patient doesn't know what allergy or reaction she's had. Might potentially be due to sulfa allergy.     Metformin Other (See Comments)     Other reaction(s): lactic acidosis     Morphine Nausea and Vomiting     Strawberry Hives     Sulfa Drugs Unknown     Shakiness/sweating     Tequin      Other reaction(s): Tremors     Cephalexin Rash     Tolerated ceftriaxone 7/24/22     Flonase [Fluticasone]      Bloody nose              Discharge Medications:     Current Discharge Medication List      START taking these medications    Details   aspirin (ASA) 81 MG EC tablet Take 1 tablet (81 mg) by mouth daily  Qty: 30 tablet, Refills: 0    Associated Diagnoses: Peripheral arterial disease (H)      atorvastatin (LIPITOR) 40 MG tablet Take 1 tablet (40 mg) by mouth daily  Qty: 30 tablet, Refills: 0    Associated Diagnoses: Peripheral arterial disease (H)      cefuroxime (CEFTIN) 500 MG tablet Take 1 tablet (500 mg) by mouth 2 times daily  Qty: 14 tablet, Refills: 0    Associated Diagnoses: Cellulitis of right lower extremity      insulin glargine (LANTUS PEN) 100 UNIT/ML pen Inject 7 Units Subcutaneous At Bedtime  Qty: 15 mL, Refills: 0    Comments: If Lantus is not covered by insurance, may substitute Basaglar or Semglee or other insulin glargine product per insurance preference at same dose and frequency.    Associated Diagnoses: Type 2 diabetes, HbA1c goal < 7% (H)         CONTINUE these  medications which have CHANGED    Details   clindamycin (CLEOCIN) 300 MG capsule Take 1 capsule (300 mg) by mouth 3 times daily For 5 days.  Qty: 21 capsule, Refills: 0    Associated Diagnoses: Cellulitis of right lower extremity         CONTINUE these medications which have NOT CHANGED    Details   acetaminophen (TYLENOL) 500 MG tablet Take 500-1,000 mg by mouth 3 times daily as needed for mild pain      albuterol (PROAIR HFA) 108 (90 BASE) MCG/ACT inhaler Inhale two puffs by mouth every 4 to 6 hours prn      ANTACID CALCIUM 500 MG chewable tablet Take 2 chew tab by mouth 2 times daily as needed      bacitracin 500 UNIT/GM external ointment Apply topically 2 times daily Apply to wound bed twice daily on right great toe. Place gauze to cover and separate toes from rubbing. Wrap dressing with Kerlix to hold in place.      camphor-menthol (DERMASARRA) 0.5-0.5 % external lotion Apply topically 2 times daily      carboxymethylcellulose PF (REFRESH PLUS) 0.5 % ophthalmic solution Place 1 drop into both eyes every 4 hours as needed for dry eyes      cholestyramine (QUESTRAN) 4 g packet Take 1 packet by mouth 3 times daily as needed      Dapagliflozin-sAXagliptin (QTERN) 10-5 MG TABS Take 10 mg by mouth daily      DEEP SEA NASAL SPRAY 0.65 % nasal spray Spray 2 sprays in nostril every 2 hours as needed      diclofenac (VOLTAREN) 1 % topical gel Apply 4 g topically 2 times daily Apply to right ankle.      escitalopram (LEXAPRO) 10 MG tablet Take 10 mg by mouth daily      furosemide (LASIX) 20 MG tablet Take 20 mg by mouth three times a week Take on Mon, Wed, Fri.      gabapentin (NEURONTIN) 300 MG capsule Take 300 mg by mouth At Bedtime      ketoconazole (NIZORAL) 2 % external shampoo Apply 1 mL topically every 7 days      loperamide (IMODIUM) 2 MG capsule Take 2-4 mg by mouth 4 times daily as needed for diarrhea      magnesium chloride 535 (64 Mg) MG TBEC CR tablet Take 535 mg by mouth 2 times daily      metFORMIN  (GLUCOPHAGE-XR) 500 MG 24 hr tablet Take 500 mg by mouth daily      miconazole with skin protectant (NALDO ANTIFUNGAL) 2 % CREA cream Apply to sore on buttocks as needed.      mometasone (NASONEX) 50 MCG/ACT nasal spray Spray 1 spray into both nostrils daily      MUCUS RELIEF 600 MG 12 hr tablet Take 600 mg by mouth 2 times daily as needed      multivitamin w/minerals (THERA-VIT-M) tablet Take 1 tablet by mouth daily      nystatin (MYCOSTATIN) 022205 UNIT/GM external powder Apply twice daily to groin folds.      omeprazole (PRILOSEC) 20 MG DR capsule Take 10 mg by mouth daily      Vitamin D, Cholecalciferol, 50 MCG (2000 UT) CAPS Take 2,000 Units by mouth daily      insulin pen needle (BD AUTOSHIELD DUO) 30G X 5 MM USE TO INJECT INSULIN ONCE DAILY         STOP taking these medications       insulin glargine (LANTUS VIAL) 100 UNIT/ML vial Comments:   Reason for Stopping:                      Condition on Discharge:     Discharge condition: Fair   Discharge vitals: Blood pressure 114/56, pulse 70, temperature 97.7  F (36.5  C), temperature source Temporal, resp. rate 20, weight 65.8 kg (145 lb), SpO2 95 %, not currently breastfeeding.   Code status on discharge: DNR / DNI      BASIC PHYSICAL EXAMINATION:  GENERAL: No apparent distress.  CARDIOVASCULAR: Regular rate and rhythm without murmurs.  PULMONARY: Clear to auscultation bilaterally.   GASTROINTESTINAL: Abdomen soft, non-tender.  EXTREMITIES: No edema, pulses intact.  NEUROLOGIC: No focal deficits.            History of Illness:   See detailed admission note for full details.               Procedures excluding imaging which is summarized below:     Please see details in the electronic medical record.           Consultations:     PHARMACY TO [facility]  PODIATRY IP CONSULT  PHARMACY TO [facility]  VIRTUAL VASCULAR SURGERY ADULT IP CONSULT  PALLIATIVE CARE ADULT IP CONSULT  CARE MANAGEMENT / SOCIAL WORK IP CONSULT  SPIRITUAL HEALTH SERVICES IP CONSULT  INFECTIOUS  DISEASES IP CONSULT          Significant Results:     Results for orders placed or performed during the hospital encounter of 07/24/22   XR Toe Right G/E 2 Views    Narrative    EXAM: XR TOE RIGHT G/E 2 VIEWS  LOCATION: [facility]  DATE/TIME: 07/24/2022, 1:19 PM    INDICATION: Swelling, redness, pain ?osteo.  COMPARISON: None.      Impression    IMPRESSION: No definite evidence of osteomyelitis. Osteopenia. Moderate degenerative changes at the IP joint of the great toe. Hammertoe deformities.     US ABDELRAHMAN Doppler No Exercise    Narrative    [facility]  LOCATION: [facility]  DATE: 7/25/2022    EXAM: RESTING ANKLE-BRACHIAL INDICES (ABIs)    INDICATION: Decreased pedal pulses. Right foot wound.    COMPARISON: None.    ABDELRAHMAN FINDINGS:   RIGHT:  Brachial: 137  Ankle (PT): Not obtainable  Ankle (DP): 94--0.67  Digit: Not obtainable    LEFT:  Brachial: 140  Ankle (PT): 27--0.19  Ankle (DP): Greater than 254--noncompressible  Digit: 57--0.41    WAVEFORMS:   RIGHT: Monophasic at the ankle. Occlusive at digits 1, 2 and 3 and  stenotic at the fourth and fifth digits.  LEFT: Multiphasic at the femoral and popliteal segments an monophasic  of the tibial vessels. Stenotic wave forms throughout the digits.      Impression    IMPRESSION:  1.  RIGHT LOWER EXTREMITY: Moderate resting ischemia. Digit pressure  not obtainable. Occlusive waveforms digits 1 through 3.  2.  LEFT LOWER EXTREMITY: Noncompressible vasculature at the dorsalis  pedis. Severe resting ischemia based on posterior tibial pressure.  However, this may not be reflective of true perfusion due to  noncompressibility of the dorsalis pedis. Digit pressure with favor  wound healing.    FEDERICA RHODES MD         SYSTEM ID:  [id number]   US Lower Extremity Arterial Duplex Right    Narrative    EXAM: US LOWER EXTREMITY ARTERIAL DUPLEX RIGHT  LOCATION: [facility]  DATE/TIME: 7/25/2022 10:22 PM    INDICATION:  Cellulitis and Eschar on right great toe with poor waveform on ABDELRAHMAN.  COMPARISON: None.  TECHNIQUE: Duplex utilizing 2D gray-scale imaging, Doppler interrogation with color-flow and spectral waveform analysis.    FINDINGS: Diffuse atheromatous plaque seen throughout the arteries of the right lower extremity. There are mostly monophasic waveforms seen throughout the arteries of the right lower extremity suggesting potential hemodynamically significant stenosis   more proximally. CTA or MRA could BE considered in further evaluation if indicated. Anterior tibial and posterior tibial arteries are occluded at the distal calf level.    RIGHT LOWER EXTREMITY ARTERIAL ASSESSMENT:    Common femoral artery: 35 cm/s  Profunda femoris artery: 55 cm/s  SFA (proximal): 52 cm/s  SFA (mid): 25 cm/s  SFA (distal): 20 cm/s  Popliteal artery: 39 cm/s  Posterior tibial artery: Occluded   Dorsalis pedis artery: Occluded       Impression    IMPRESSION:  1.  Diffuse atheromatous disease throughout the arteries of the right lower extremity.    2.  Anterior tibial and posterior tibial arteries are occluded at the distal calf level.    3.  Mostly monophasic waveform seen in the arteries of the right lower extremity suggest possible dynamically significant stenosis more proximally. MRA or CTA could better evaluate if indicated.       Transthoracic Echocardiogram Results:  No results found for this or any previous visit (from the past 4320 hour(s)).             Pending Results:     Unresulted Labs Ordered in the Past 30 Days of this Admission     Date and Time Order Name Status Description    7/25/2022  7:48 PM Blood Culture Hand, Left Preliminary     7/24/2022 12:47 PM Blood Culture Peripheral Blood Preliminary                       Discharge Instructions and Follow-Up:     Discharge instructions and follow-up:   Discharge Procedure Orders   Home Care Referral   Referral Priority: Routine: Next available opening Referral Type: Home Health  Therapies & Aides   Number of Visits Requested: 1     Reason for your hospital stay   Order Comments: Right Great Toe Cellulitis, Peripheral Arterial Disease     Follow-up and recommended labs and tests    Order Comments: Follow up with primary care provider, Sherrie Briceno, within 7 days for hospital follow- up.  The following labs/tests are recommended: CBC, BMP.     Activity   Order Comments: Your activity upon discharge: activity as tolerated     Order Specific Question Answer Comments   Is discharge order? Yes      Diet   Order Comments: Follow this diet upon discharge: Orders Placed This Encounter      Moderate Consistent Carb (60 g CHO per Meal) Diet     Order Specific Question Answer Comments   Is discharge order? Yes              Hospital Course:     Mitali Richardson is a 89 year old female with a history of type 2 diabetes mellitus, COPD, dementia admitted on 7/24/2022 with toe pain.  Patient is a poor historian so much of the history was obtained through the emergency department provider as well as the son at bedside.  In the emergency department, the patient was found to have a blood pressure of 117/79, heart rate 87, temperature 98.2  F, respiratory rate 16, SPO2 98% on room air.  Initial lab work showed mild leukocytosis of 12.6.  X-ray of the right great toe showed no definite evidence of osteomyelitis, moderate degenerative changes noted.  The patient was started on vancomycin, clindamycin, and IV ceftriaxone.  Podiatry was consulted to see the patient.  Podiatry recommended against surgical intervention at this point, however did recommend vascular surgery evaluation and ABIs.  Arterial duplex suggested aortoiliac occlusive disease along with tibial level occlusive disease that are not adequate for healing.  Vascular surgery recommended aspirin 81 mg, statin with Lipitor 40 mg, antibiotics, and palliative care evaluation.  Palliative care was consulted to see the patient and initially the  patient's son elected for DNR/DNI CODE STATUS and to pursue home with hospice.  Upon further discussions the patient's son decided against hospice.  Infectious disease was consulted to see the patient and recommended 10 days of antibiotics with cefuroxime and clindamycin.  On 7/28/2022, the patient was medically stable for discharge.    The patient was seen, examined, and counseled on this day. The hospitalization and plan of care was reviewed with the patient extensively. All questions were addressed and the patient agreed to follow-up as noted above.      Total time spent in face to face contact with the patient and coordinating discharge was:  35 Minutes    Moises Abdalla DO  [facility] Hospitalist  [address] [place] [address]  07/28/2022

## 2022-07-29 LAB — BACTERIA BLD CULT: NO GROWTH

## 2022-07-30 LAB — BACTERIA BLD CULT: NO GROWTH

## 2022-10-30 ENCOUNTER — HEALTH MAINTENANCE LETTER (OUTPATIENT)
Age: 87
End: 2022-10-30

## 2023-01-01 ENCOUNTER — HEALTH MAINTENANCE LETTER (OUTPATIENT)
Age: 88
End: 2023-01-01

## 2023-01-01 ENCOUNTER — APPOINTMENT (OUTPATIENT)
Dept: GENERAL RADIOLOGY | Facility: CLINIC | Age: 88
End: 2023-01-01
Attending: EMERGENCY MEDICINE
Payer: MEDICARE

## 2023-01-01 ENCOUNTER — HOSPITAL ENCOUNTER (OUTPATIENT)
Facility: CLINIC | Age: 88
Setting detail: OBSERVATION
Discharge: HOME | End: 2023-03-13
Attending: EMERGENCY MEDICINE | Admitting: NURSE PRACTITIONER
Payer: MEDICARE

## 2023-01-01 ENCOUNTER — APPOINTMENT (OUTPATIENT)
Dept: GENERAL RADIOLOGY | Facility: CLINIC | Age: 88
End: 2023-01-01
Attending: NURSE PRACTITIONER
Payer: MEDICARE

## 2023-01-01 ENCOUNTER — APPOINTMENT (OUTPATIENT)
Dept: PHYSICAL THERAPY | Facility: CLINIC | Age: 88
End: 2023-01-01
Attending: INTERNAL MEDICINE
Payer: MEDICARE

## 2023-01-01 ENCOUNTER — APPOINTMENT (OUTPATIENT)
Dept: PHYSICAL THERAPY | Facility: CLINIC | Age: 88
End: 2023-01-01
Payer: MEDICARE

## 2023-01-01 VITALS
SYSTOLIC BLOOD PRESSURE: 176 MMHG | BODY MASS INDEX: 26.96 KG/M2 | OXYGEN SATURATION: 95 % | RESPIRATION RATE: 18 BRPM | WEIGHT: 133.7 LBS | HEART RATE: 93 BPM | DIASTOLIC BLOOD PRESSURE: 81 MMHG | HEIGHT: 59 IN | TEMPERATURE: 98.7 F

## 2023-01-01 DIAGNOSIS — S89.91XA KNEE INJURY, RIGHT, INITIAL ENCOUNTER: ICD-10-CM

## 2023-01-01 DIAGNOSIS — R45.1 AGITATION: ICD-10-CM

## 2023-01-01 DIAGNOSIS — N30.00 ACUTE CYSTITIS WITHOUT HEMATURIA: Primary | ICD-10-CM

## 2023-01-01 DIAGNOSIS — Z11.52 ENCOUNTER FOR SCREENING LABORATORY TESTING FOR COVID-19 VIRUS: ICD-10-CM

## 2023-01-01 DIAGNOSIS — E11.621 DIABETIC ULCER OF RIGHT FOOT ASSOCIATED WITH TYPE 2 DIABETES MELLITUS, UNSPECIFIED PART OF FOOT, UNSPECIFIED ULCER STAGE (H): ICD-10-CM

## 2023-01-01 DIAGNOSIS — E83.42 HYPOMAGNESEMIA: ICD-10-CM

## 2023-01-01 DIAGNOSIS — R50.9 FEVER, UNSPECIFIED FEVER CAUSE: ICD-10-CM

## 2023-01-01 DIAGNOSIS — L97.519 DIABETIC ULCER OF RIGHT FOOT ASSOCIATED WITH TYPE 2 DIABETES MELLITUS, UNSPECIFIED PART OF FOOT, UNSPECIFIED ULCER STAGE (H): ICD-10-CM

## 2023-01-01 LAB
ALBUMIN SERPL BCG-MCNC: 2.9 G/DL (ref 3.5–5.2)
ALBUMIN SERPL BCG-MCNC: 3.3 G/DL (ref 3.5–5.2)
ALBUMIN UR-MCNC: NEGATIVE MG/DL
ALP SERPL-CCNC: 76 U/L (ref 35–104)
ALP SERPL-CCNC: 81 U/L (ref 35–104)
ALT SERPL W P-5'-P-CCNC: 10 U/L (ref 10–35)
ALT SERPL W P-5'-P-CCNC: 6 U/L (ref 10–35)
ANION GAP SERPL CALCULATED.3IONS-SCNC: 11 MMOL/L (ref 7–15)
ANION GAP SERPL CALCULATED.3IONS-SCNC: 12 MMOL/L (ref 7–15)
ANION GAP SERPL CALCULATED.3IONS-SCNC: 8 MMOL/L (ref 7–15)
APPEARANCE UR: ABNORMAL
AST SERPL W P-5'-P-CCNC: 20 U/L (ref 10–35)
AST SERPL W P-5'-P-CCNC: 20 U/L (ref 10–35)
BACTERIA #/AREA URNS HPF: ABNORMAL /HPF
BACTERIA BLD CULT: NO GROWTH
BACTERIA BLD CULT: NO GROWTH
BACTERIA UR CULT: ABNORMAL
BACTERIA UR CULT: ABNORMAL
BASOPHILS # BLD AUTO: 0.1 10E3/UL (ref 0–0.2)
BASOPHILS NFR BLD AUTO: 1 %
BILIRUB SERPL-MCNC: 0.4 MG/DL
BILIRUB SERPL-MCNC: 0.7 MG/DL
BILIRUB UR QL STRIP: NEGATIVE
BUN SERPL-MCNC: 12.7 MG/DL (ref 8–23)
BUN SERPL-MCNC: 16.1 MG/DL (ref 8–23)
BUN SERPL-MCNC: 16.3 MG/DL (ref 8–23)
CALCIUM SERPL-MCNC: 8 MG/DL (ref 8.2–9.6)
CALCIUM SERPL-MCNC: 8.4 MG/DL (ref 8.2–9.6)
CALCIUM SERPL-MCNC: 8.4 MG/DL (ref 8.2–9.6)
CHLORIDE SERPL-SCNC: 100 MMOL/L (ref 98–107)
CHLORIDE SERPL-SCNC: 100 MMOL/L (ref 98–107)
CHLORIDE SERPL-SCNC: 101 MMOL/L (ref 98–107)
COLOR UR AUTO: YELLOW
CREAT SERPL-MCNC: 0.63 MG/DL (ref 0.51–0.95)
CREAT SERPL-MCNC: 0.77 MG/DL (ref 0.51–0.95)
CREAT SERPL-MCNC: 0.79 MG/DL (ref 0.51–0.95)
CRP SERPL-MCNC: 28.34 MG/L
CRP SERPL-MCNC: 42.28 MG/L
CRP SERPL-MCNC: 57.17 MG/L
DEPRECATED HCO3 PLAS-SCNC: 23 MMOL/L (ref 22–29)
DEPRECATED HCO3 PLAS-SCNC: 23 MMOL/L (ref 22–29)
DEPRECATED HCO3 PLAS-SCNC: 25 MMOL/L (ref 22–29)
EOSINOPHIL # BLD AUTO: 0.1 10E3/UL (ref 0–0.7)
EOSINOPHIL NFR BLD AUTO: 1 %
ERYTHROCYTE [DISTWIDTH] IN BLOOD BY AUTOMATED COUNT: 16.1 % (ref 10–15)
ERYTHROCYTE [DISTWIDTH] IN BLOOD BY AUTOMATED COUNT: 16.3 % (ref 10–15)
ERYTHROCYTE [DISTWIDTH] IN BLOOD BY AUTOMATED COUNT: 16.4 % (ref 10–15)
ERYTHROCYTE [SEDIMENTATION RATE] IN BLOOD BY WESTERGREN METHOD: 13 MM/HR (ref 0–30)
FLUAV RNA SPEC QL NAA+PROBE: NEGATIVE
FLUBV RNA RESP QL NAA+PROBE: NEGATIVE
GFR SERPL CREATININE-BSD FRML MDRD: 70 ML/MIN/1.73M2
GFR SERPL CREATININE-BSD FRML MDRD: 72 ML/MIN/1.73M2
GFR SERPL CREATININE-BSD FRML MDRD: 83 ML/MIN/1.73M2
GLUCOSE SERPL-MCNC: 177 MG/DL (ref 70–99)
GLUCOSE SERPL-MCNC: 198 MG/DL (ref 70–99)
GLUCOSE SERPL-MCNC: 99 MG/DL (ref 70–99)
GLUCOSE UR STRIP-MCNC: NEGATIVE MG/DL
HCT VFR BLD AUTO: 34.7 % (ref 35–47)
HCT VFR BLD AUTO: 37.4 % (ref 35–47)
HCT VFR BLD AUTO: 38.2 % (ref 35–47)
HGB BLD-MCNC: 11 G/DL (ref 11.7–15.7)
HGB BLD-MCNC: 11.8 G/DL (ref 11.7–15.7)
HGB BLD-MCNC: 11.9 G/DL (ref 11.7–15.7)
HGB UR QL STRIP: NEGATIVE
HOLD SPECIMEN: NORMAL
IMM GRANULOCYTES # BLD: 0 10E3/UL
IMM GRANULOCYTES NFR BLD: 0 %
KETONES UR STRIP-MCNC: 20 MG/DL
LACTATE SERPL-SCNC: 1.4 MMOL/L (ref 0.7–2)
LEUKOCYTE ESTERASE UR QL STRIP: ABNORMAL
LYMPHOCYTES # BLD AUTO: 0.9 10E3/UL (ref 0.8–5.3)
LYMPHOCYTES NFR BLD AUTO: 10 %
MAGNESIUM SERPL-MCNC: 1.3 MG/DL (ref 1.7–2.3)
MAGNESIUM SERPL-MCNC: 1.7 MG/DL (ref 1.7–2.3)
MAGNESIUM SERPL-MCNC: 2.5 MG/DL (ref 1.7–2.3)
MCH RBC QN AUTO: 27.9 PG (ref 26.5–33)
MCH RBC QN AUTO: 27.9 PG (ref 26.5–33)
MCH RBC QN AUTO: 28 PG (ref 26.5–33)
MCHC RBC AUTO-ENTMCNC: 31.2 G/DL (ref 31.5–36.5)
MCHC RBC AUTO-ENTMCNC: 31.6 G/DL (ref 31.5–36.5)
MCHC RBC AUTO-ENTMCNC: 31.7 G/DL (ref 31.5–36.5)
MCV RBC AUTO: 88 FL (ref 78–100)
MCV RBC AUTO: 89 FL (ref 78–100)
MCV RBC AUTO: 90 FL (ref 78–100)
MONOCYTES # BLD AUTO: 0.7 10E3/UL (ref 0–1.3)
MONOCYTES NFR BLD AUTO: 7 %
MUCOUS THREADS #/AREA URNS LPF: PRESENT /LPF
NEUTROPHILS # BLD AUTO: 7.4 10E3/UL (ref 1.6–8.3)
NEUTROPHILS NFR BLD AUTO: 81 %
NITRATE UR QL: POSITIVE
NRBC # BLD AUTO: 0 10E3/UL
NRBC BLD AUTO-RTO: 0 /100
PH UR STRIP: 6 [PH] (ref 5–7)
PLATELET # BLD AUTO: 150 10E3/UL (ref 150–450)
PLATELET # BLD AUTO: 177 10E3/UL (ref 150–450)
PLATELET # BLD AUTO: 181 10E3/UL (ref 150–450)
POTASSIUM SERPL-SCNC: 3.8 MMOL/L (ref 3.4–5.3)
POTASSIUM SERPL-SCNC: 4.1 MMOL/L (ref 3.4–5.3)
POTASSIUM SERPL-SCNC: 4.4 MMOL/L (ref 3.4–5.3)
PROT SERPL-MCNC: 6 G/DL (ref 6.4–8.3)
PROT SERPL-MCNC: 6.3 G/DL (ref 6.4–8.3)
RBC # BLD AUTO: 3.94 10E6/UL (ref 3.8–5.2)
RBC # BLD AUTO: 4.21 10E6/UL (ref 3.8–5.2)
RBC # BLD AUTO: 4.27 10E6/UL (ref 3.8–5.2)
RBC URINE: 0 /HPF
RSV RNA SPEC NAA+PROBE: NEGATIVE
SARS-COV-2 RNA RESP QL NAA+PROBE: NEGATIVE
SODIUM SERPL-SCNC: 132 MMOL/L (ref 136–145)
SODIUM SERPL-SCNC: 134 MMOL/L (ref 136–145)
SODIUM SERPL-SCNC: 137 MMOL/L (ref 136–145)
SP GR UR STRIP: 1.02 (ref 1–1.03)
SQUAMOUS EPITHELIAL: <1 /HPF
UROBILINOGEN UR STRIP-MCNC: 4 MG/DL
WBC # BLD AUTO: 5.9 10E3/UL (ref 4–11)
WBC # BLD AUTO: 8.6 10E3/UL (ref 4–11)
WBC # BLD AUTO: 9.1 10E3/UL (ref 4–11)
WBC URINE: 59 /HPF

## 2023-01-01 PROCEDURE — 258N000003 HC RX IP 258 OP 636: Performed by: EMERGENCY MEDICINE

## 2023-01-01 PROCEDURE — 85027 COMPLETE CBC AUTOMATED: CPT | Performed by: NURSE PRACTITIONER

## 2023-01-01 PROCEDURE — 250N000013 HC RX MED GY IP 250 OP 250 PS 637: Performed by: NURSE PRACTITIONER

## 2023-01-01 PROCEDURE — G0378 HOSPITAL OBSERVATION PER HR: HCPCS

## 2023-01-01 PROCEDURE — 97162 PT EVAL MOD COMPLEX 30 MIN: CPT | Mod: GP

## 2023-01-01 PROCEDURE — 99233 SBSQ HOSP IP/OBS HIGH 50: CPT | Mod: GW | Performed by: NURSE PRACTITIONER

## 2023-01-01 PROCEDURE — 97110 THERAPEUTIC EXERCISES: CPT | Mod: GP | Performed by: PHYSICAL THERAPIST

## 2023-01-01 PROCEDURE — 36415 COLL VENOUS BLD VENIPUNCTURE: CPT | Performed by: INTERNAL MEDICINE

## 2023-01-01 PROCEDURE — 87086 URINE CULTURE/COLONY COUNT: CPT | Performed by: EMERGENCY MEDICINE

## 2023-01-01 PROCEDURE — 96361 HYDRATE IV INFUSION ADD-ON: CPT

## 2023-01-01 PROCEDURE — 96368 THER/DIAG CONCURRENT INF: CPT

## 2023-01-01 PROCEDURE — 86140 C-REACTIVE PROTEIN: CPT | Performed by: NURSE PRACTITIONER

## 2023-01-01 PROCEDURE — 99285 EMERGENCY DEPT VISIT HI MDM: CPT | Mod: CS | Performed by: EMERGENCY MEDICINE

## 2023-01-01 PROCEDURE — 96365 THER/PROPH/DIAG IV INF INIT: CPT | Performed by: EMERGENCY MEDICINE

## 2023-01-01 PROCEDURE — 83735 ASSAY OF MAGNESIUM: CPT | Performed by: INTERNAL MEDICINE

## 2023-01-01 PROCEDURE — 96361 HYDRATE IV INFUSION ADD-ON: CPT | Performed by: EMERGENCY MEDICINE

## 2023-01-01 PROCEDURE — 999N000147 HC STATISTIC PT IP EVAL DEFER: Performed by: PHYSICAL THERAPIST

## 2023-01-01 PROCEDURE — 87040 BLOOD CULTURE FOR BACTERIA: CPT | Performed by: EMERGENCY MEDICINE

## 2023-01-01 PROCEDURE — 250N000011 HC RX IP 250 OP 636: Mod: JZ | Performed by: INTERNAL MEDICINE

## 2023-01-01 PROCEDURE — 96372 THER/PROPH/DIAG INJ SC/IM: CPT | Performed by: INTERNAL MEDICINE

## 2023-01-01 PROCEDURE — 83605 ASSAY OF LACTIC ACID: CPT | Performed by: EMERGENCY MEDICINE

## 2023-01-01 PROCEDURE — 73630 X-RAY EXAM OF FOOT: CPT | Mod: RT

## 2023-01-01 PROCEDURE — 86140 C-REACTIVE PROTEIN: CPT | Performed by: EMERGENCY MEDICINE

## 2023-01-01 PROCEDURE — 99222 1ST HOSP IP/OBS MODERATE 55: CPT | Mod: GW | Performed by: INTERNAL MEDICINE

## 2023-01-01 PROCEDURE — 83735 ASSAY OF MAGNESIUM: CPT | Performed by: EMERGENCY MEDICINE

## 2023-01-01 PROCEDURE — 250N000013 HC RX MED GY IP 250 OP 250 PS 637: Performed by: INTERNAL MEDICINE

## 2023-01-01 PROCEDURE — 80053 COMPREHEN METABOLIC PANEL: CPT | Performed by: INTERNAL MEDICINE

## 2023-01-01 PROCEDURE — 96366 THER/PROPH/DIAG IV INF ADDON: CPT

## 2023-01-01 PROCEDURE — 80053 COMPREHEN METABOLIC PANEL: CPT | Performed by: EMERGENCY MEDICINE

## 2023-01-01 PROCEDURE — 73562 X-RAY EXAM OF KNEE 3: CPT | Mod: RT

## 2023-01-01 PROCEDURE — 99239 HOSP IP/OBS DSCHRG MGMT >30: CPT | Mod: GW | Performed by: NURSE PRACTITIONER

## 2023-01-01 PROCEDURE — 87637 SARSCOV2&INF A&B&RSV AMP PRB: CPT | Performed by: EMERGENCY MEDICINE

## 2023-01-01 PROCEDURE — 85652 RBC SED RATE AUTOMATED: CPT | Performed by: EMERGENCY MEDICINE

## 2023-01-01 PROCEDURE — 36415 COLL VENOUS BLD VENIPUNCTURE: CPT | Performed by: EMERGENCY MEDICINE

## 2023-01-01 PROCEDURE — 96375 TX/PRO/DX INJ NEW DRUG ADDON: CPT | Performed by: EMERGENCY MEDICINE

## 2023-01-01 PROCEDURE — 250N000011 HC RX IP 250 OP 636: Performed by: EMERGENCY MEDICINE

## 2023-01-01 PROCEDURE — 85025 COMPLETE CBC W/AUTO DIFF WBC: CPT | Performed by: EMERGENCY MEDICINE

## 2023-01-01 PROCEDURE — 73080 X-RAY EXAM OF ELBOW: CPT | Mod: LT

## 2023-01-01 PROCEDURE — 99285 EMERGENCY DEPT VISIT HI MDM: CPT | Mod: CS,25 | Performed by: EMERGENCY MEDICINE

## 2023-01-01 PROCEDURE — 96366 THER/PROPH/DIAG IV INF ADDON: CPT | Performed by: EMERGENCY MEDICINE

## 2023-01-01 PROCEDURE — 258N000003 HC RX IP 258 OP 636: Performed by: INTERNAL MEDICINE

## 2023-01-01 PROCEDURE — 81001 URINALYSIS AUTO W/SCOPE: CPT | Performed by: EMERGENCY MEDICINE

## 2023-01-01 PROCEDURE — 250N000013 HC RX MED GY IP 250 OP 250 PS 637: Performed by: EMERGENCY MEDICINE

## 2023-01-01 PROCEDURE — 85014 HEMATOCRIT: CPT | Performed by: INTERNAL MEDICINE

## 2023-01-01 PROCEDURE — 96376 TX/PRO/DX INJ SAME DRUG ADON: CPT

## 2023-01-01 PROCEDURE — 97530 THERAPEUTIC ACTIVITIES: CPT | Mod: GP

## 2023-01-01 PROCEDURE — 71045 X-RAY EXAM CHEST 1 VIEW: CPT

## 2023-01-01 PROCEDURE — C9803 HOPD COVID-19 SPEC COLLECT: HCPCS | Performed by: EMERGENCY MEDICINE

## 2023-01-01 PROCEDURE — 80048 BASIC METABOLIC PNL TOTAL CA: CPT | Performed by: NURSE PRACTITIONER

## 2023-01-01 PROCEDURE — 250N000011 HC RX IP 250 OP 636: Performed by: INTERNAL MEDICINE

## 2023-01-01 PROCEDURE — 97530 THERAPEUTIC ACTIVITIES: CPT | Mod: GP | Performed by: PHYSICAL THERAPIST

## 2023-01-01 RX ORDER — NICOTINE POLACRILEX 4 MG
15-30 LOZENGE BUCCAL
Status: DISCONTINUED | OUTPATIENT
Start: 2023-01-01 | End: 2023-01-01 | Stop reason: HOSPADM

## 2023-01-01 RX ORDER — ACETAMINOPHEN 500 MG
1000 TABLET ORAL ONCE
Status: COMPLETED | OUTPATIENT
Start: 2023-01-01 | End: 2023-01-01

## 2023-01-01 RX ORDER — SULFAMETHOXAZOLE/TRIMETHOPRIM 800-160 MG
1 TABLET ORAL 2 TIMES DAILY
Qty: 14 TABLET | Refills: 0 | Status: SHIPPED | OUTPATIENT
Start: 2023-01-01 | End: 2023-01-01

## 2023-01-01 RX ORDER — LORAZEPAM 0.5 MG/1
0.5 TABLET ORAL EVERY 4 HOURS PRN
COMMUNITY

## 2023-01-01 RX ORDER — SULFAMETHOXAZOLE/TRIMETHOPRIM 800-160 MG
1 TABLET ORAL 2 TIMES DAILY
Status: DISCONTINUED | OUTPATIENT
Start: 2023-01-01 | End: 2023-01-01 | Stop reason: HOSPADM

## 2023-01-01 RX ORDER — CEFTRIAXONE 1 G/1
1 INJECTION, POWDER, FOR SOLUTION INTRAMUSCULAR; INTRAVENOUS EVERY 24 HOURS
Status: DISCONTINUED | OUTPATIENT
Start: 2023-01-01 | End: 2023-01-01

## 2023-01-01 RX ORDER — MORPHINE SULFATE 30 MG/1
2.5 TABLET ORAL EVERY 8 HOURS PRN
COMMUNITY

## 2023-01-01 RX ORDER — ONDANSETRON 4 MG/1
4 TABLET, ORALLY DISINTEGRATING ORAL EVERY 6 HOURS PRN
Status: DISCONTINUED | OUTPATIENT
Start: 2023-01-01 | End: 2023-01-01 | Stop reason: HOSPADM

## 2023-01-01 RX ORDER — MAGNESIUM SULFATE 1 G/100ML
4 INJECTION INTRAVENOUS ONCE
Status: COMPLETED | OUTPATIENT
Start: 2023-01-01 | End: 2023-01-01

## 2023-01-01 RX ORDER — ACETAMINOPHEN 650 MG/1
650 SUPPOSITORY RECTAL EVERY 6 HOURS PRN
Status: DISCONTINUED | OUTPATIENT
Start: 2023-01-01 | End: 2023-01-01 | Stop reason: HOSPADM

## 2023-01-01 RX ORDER — CEFTRIAXONE 2 G/1
2 INJECTION, POWDER, FOR SOLUTION INTRAMUSCULAR; INTRAVENOUS ONCE
Status: COMPLETED | OUTPATIENT
Start: 2023-01-01 | End: 2023-01-01

## 2023-01-01 RX ORDER — ALBUTEROL SULFATE 90 UG/1
2 AEROSOL, METERED RESPIRATORY (INHALATION) EVERY 4 HOURS PRN
Status: DISCONTINUED | OUTPATIENT
Start: 2023-01-01 | End: 2023-01-01 | Stop reason: HOSPADM

## 2023-01-01 RX ORDER — OMEPRAZOLE 10 MG/1
10 CAPSULE, DELAYED RELEASE ORAL DAILY
COMMUNITY
Start: 2023-01-01

## 2023-01-01 RX ORDER — ALBUTEROL SULFATE 90 UG/1
2 AEROSOL, METERED RESPIRATORY (INHALATION) ONCE
Status: COMPLETED | OUTPATIENT
Start: 2023-01-01 | End: 2023-01-01

## 2023-01-01 RX ORDER — ONDANSETRON 2 MG/ML
4 INJECTION INTRAMUSCULAR; INTRAVENOUS EVERY 6 HOURS PRN
Status: DISCONTINUED | OUTPATIENT
Start: 2023-01-01 | End: 2023-01-01 | Stop reason: HOSPADM

## 2023-01-01 RX ORDER — SODIUM CHLORIDE 9 MG/ML
INJECTION, SOLUTION INTRAVENOUS CONTINUOUS
Status: DISCONTINUED | OUTPATIENT
Start: 2023-01-01 | End: 2023-01-01

## 2023-01-01 RX ORDER — HEPARIN SODIUM 5000 [USP'U]/.5ML
5000 INJECTION, SOLUTION INTRAVENOUS; SUBCUTANEOUS EVERY 12 HOURS
Status: DISCONTINUED | OUTPATIENT
Start: 2023-01-01 | End: 2023-01-01 | Stop reason: HOSPADM

## 2023-01-01 RX ORDER — MORPHINE SULFATE 2 MG/ML
2 INJECTION, SOLUTION INTRAMUSCULAR; INTRAVENOUS EVERY 4 HOURS PRN
Status: DISCONTINUED | OUTPATIENT
Start: 2023-01-01 | End: 2023-01-01 | Stop reason: HOSPADM

## 2023-01-01 RX ORDER — DEXTROSE MONOHYDRATE 25 G/50ML
25-50 INJECTION, SOLUTION INTRAVENOUS
Status: DISCONTINUED | OUTPATIENT
Start: 2023-01-01 | End: 2023-01-01 | Stop reason: HOSPADM

## 2023-01-01 RX ORDER — SODIUM CHLORIDE 9 MG/ML
INJECTION, SOLUTION INTRAVENOUS CONTINUOUS
Status: ACTIVE | OUTPATIENT
Start: 2023-01-01 | End: 2023-01-01

## 2023-01-01 RX ORDER — ACETAMINOPHEN 325 MG/1
650 TABLET ORAL EVERY 6 HOURS PRN
Status: DISCONTINUED | OUTPATIENT
Start: 2023-01-01 | End: 2023-01-01 | Stop reason: HOSPADM

## 2023-01-01 RX ORDER — MORPHINE SULFATE 15 MG/1
15 TABLET, FILM COATED, EXTENDED RELEASE ORAL 2 TIMES DAILY
COMMUNITY
Start: 2023-01-01

## 2023-01-01 RX ADMIN — SODIUM CHLORIDE 1000 ML: 9 INJECTION, SOLUTION INTRAVENOUS at 17:17

## 2023-01-01 RX ADMIN — HEPARIN SODIUM 5000 UNITS: 10000 INJECTION, SOLUTION INTRAVENOUS; SUBCUTANEOUS at 10:50

## 2023-01-01 RX ADMIN — SODIUM CHLORIDE: 9 INJECTION, SOLUTION INTRAVENOUS at 09:49

## 2023-01-01 RX ADMIN — HEPARIN SODIUM 5000 UNITS: 10000 INJECTION, SOLUTION INTRAVENOUS; SUBCUTANEOUS at 09:32

## 2023-01-01 RX ADMIN — CEFTRIAXONE SODIUM 1 G: 1 INJECTION, POWDER, FOR SOLUTION INTRAMUSCULAR; INTRAVENOUS at 18:21

## 2023-01-01 RX ADMIN — ACETAMINOPHEN 1000 MG: 500 TABLET ORAL at 17:12

## 2023-01-01 RX ADMIN — MAGNESIUM SULFATE HEPTAHYDRATE 4 G: 1 INJECTION, SOLUTION INTRAVENOUS at 17:51

## 2023-01-01 RX ADMIN — SODIUM CHLORIDE: 9 INJECTION, SOLUTION INTRAVENOUS at 21:48

## 2023-01-01 RX ADMIN — ACETAMINOPHEN 650 MG: 325 TABLET ORAL at 13:46

## 2023-01-01 RX ADMIN — SULFAMETHOXAZOLE AND TRIMETHOPRIM 1 TABLET: 800; 160 TABLET ORAL at 10:49

## 2023-01-01 RX ADMIN — CEFTRIAXONE SODIUM 2 G: 2 INJECTION, POWDER, FOR SOLUTION INTRAMUSCULAR; INTRAVENOUS at 19:49

## 2023-01-01 ASSESSMENT — ACTIVITIES OF DAILY LIVING (ADL)
VISION_MANAGEMENT: GLASSES
ADLS_ACUITY_SCORE: 25
HEARING_DIFFICULTY_OR_DEAF: NO
ADLS_ACUITY_SCORE: 39
ADLS_ACUITY_SCORE: 43
ADLS_ACUITY_SCORE: 39
ADLS_ACUITY_SCORE: 37
ADLS_ACUITY_SCORE: 25
DOING_ERRANDS_INDEPENDENTLY_DIFFICULTY: NO
EQUIPMENT_CURRENTLY_USED_AT_HOME: WALKER, ROLLING
ADLS_ACUITY_SCORE: 39
ADLS_ACUITY_SCORE: 39
TOILETING_ISSUES: NO
ADLS_ACUITY_SCORE: 39
CONCENTRATING,_REMEMBERING_OR_MAKING_DECISIONS_DIFFICULTY: YES
DEPENDENT_IADLS:: CLEANING;COOKING;LAUNDRY;SHOPPING;MEAL PREPARATION;MEDICATION MANAGEMENT;MONEY MANAGEMENT;TRANSPORTATION;INCONTINENCE
DIFFICULTY_COMMUNICATING: NO
ADLS_ACUITY_SCORE: 39
NUMBER_OF_TIMES_PATIENT_HAS_FALLEN_WITHIN_LAST_SIX_MONTHS: 2
ADLS_ACUITY_SCORE: 39
DIFFICULTY_EATING/SWALLOWING: NO
ADLS_ACUITY_SCORE: 25
ADLS_ACUITY_SCORE: 25
ADLS_ACUITY_SCORE: 39
ADLS_ACUITY_SCORE: 25
ADLS_ACUITY_SCORE: 39
WALKING_OR_CLIMBING_STAIRS_DIFFICULTY: NO
ADLS_ACUITY_SCORE: 39
ADLS_ACUITY_SCORE: 25
ADLS_ACUITY_SCORE: 39
FALL_HISTORY_WITHIN_LAST_SIX_MONTHS: YES
WEAR_GLASSES_OR_BLIND: YES
ADLS_ACUITY_SCORE: 43
DRESSING/BATHING_DIFFICULTY: NO

## 2023-01-01 ASSESSMENT — COLUMBIA-SUICIDE SEVERITY RATING SCALE - C-SSRS
5. HAVE YOU STARTED TO WORK OUT OR WORKED OUT THE DETAILS OF HOW TO KILL YOURSELF? DO YOU INTEND TO CARRY OUT THIS PLAN?: NO
3. HAVE YOU BEEN THINKING ABOUT HOW YOU MIGHT KILL YOURSELF?: NO
4. HAVE YOU HAD THESE THOUGHTS AND HAD SOME INTENTION OF ACTING ON THEM?: NO
1. IN THE PAST MONTH, HAVE YOU WISHED YOU WERE DEAD OR WISHED YOU COULD GO TO SLEEP AND NOT WAKE UP?: NO
6. HAVE YOU EVER DONE ANYTHING, STARTED TO DO ANYTHING, OR PREPARED TO DO ANYTHING TO END YOUR LIFE?: NO
2. HAVE YOU ACTUALLY HAD ANY THOUGHTS OF KILLING YOURSELF IN THE PAST MONTH?: NO

## 2023-03-11 PROBLEM — E83.42 HYPOMAGNESEMIA: Status: ACTIVE | Noted: 2018-07-03

## 2023-03-11 PROBLEM — R53.1 GENERALIZED WEAKNESS: Status: ACTIVE | Noted: 2023-01-01

## 2023-03-11 PROBLEM — N39.0 UTI (URINARY TRACT INFECTION): Status: ACTIVE | Noted: 2023-01-01

## 2023-03-11 NOTE — ED TRIAGE NOTES
Pt arrived via EMS who stats pt fell 2 days ago and has been c/o right knee pain. Pt was evaluated by hospice and the plan is to have pt take her pain medication and be evaluated again on Monday. Pt currently lives in a memory care unit and receives hospice. Staff states pt has been refusing to take her pain medication but they did get pt to take a dose of tylenol and Morphine at 1300 today. EMS states pts daughter wanted pt brought here for further evaluation.      Triage Assessment     Row Name 03/11/23 [id number]       Triage Assessment (Adult)    Airway WDL WDL       Respiratory WDL    Respiratory WDL WDL       Skin Circulation/Temperature WDL    Skin Circulation/Temperature WDL temperature    Skin Temperature warm

## 2023-03-12 PROBLEM — E88.09 HYPOALBUMINEMIA: Status: ACTIVE | Noted: 2023-01-01

## 2023-03-12 PROBLEM — F03.90 SENILE DEMENTIA WITHOUT BEHAVIORAL DISTURBANCE (H): Status: ACTIVE | Noted: 2019-04-22

## 2023-03-12 PROBLEM — L08.9 TOE INFECTION: Status: ACTIVE | Noted: 2022-07-24

## 2023-03-12 NOTE — PROGRESS NOTES
Patient is alert to self only. Calm and cooperative with staff. X-Ray done to left elbow. Tylenol given for Right knee pain. Pain also located to left elbow. Tolerating regular diet. IV fluids discontinued. IV Rocephin. Incontinent in brief. Scattered abrasions/scab/scratches to patient. Coccyx and heels has blanchable redness. Turned and repositioned and heels elevated.

## 2023-03-12 NOTE — ED NOTES
O2 sats trending down when pt falls asleep. Attempts to place nasal canula on pt unsuccessful. Confused and uncooperative with foul language

## 2023-03-12 NOTE — H&P
[facility]    History and Physical - Hospitalist Service       Date of Admission:  3/11/2023    Assessment & Plan      Mitali Richardson is a 89 year old female admitted on 3/11/2023 for UTI, generalized weakness.    UTI. Admit to medical observation. Patient has fever of 102F but no clear source of infection when in ER Patient not septic. S/P IV ceftriaxone to treat empirically while waiting for UA. UA did in fact came back positive. IVF + continue IV ceftriaxone, monitor for sepsis and follow up with blood culture.    Generalized weakness with underlying dementia. PT/OT/possible placement at discharge.    Right knee pain. Unclear etiology. Knee XR showed no acute findings. Pain control with morphine. PT/OT as above.    Hypomagnesemia. Mg 1.3 Replace and recheck in AM.    IDDM. Blood glucose now 99, will hold home insulin. Monitor and subcutaneous insulin if necessary.    DVT PPX heparin.    Code status DNR/DNI.    Disposition home in 1-2 days    The patient's care was discussed with the bedside nursing staff        Pedro Santos MD  [facility]  Securely message with the Vocera Web Console (learn more here)  Text page via [facility] Paging/Directory      Visit/Communication Style   Virtual (Video) communication was used to evaluate Mitali.  Mitali consented to the use of video communication: yes  Video START time: 2230, 3/11/2023  Video STOP time: 2300, 3/11/2023   Patient's location: [facility]   Provider's location during the visit: [facility] Tele-medicine site        ______________________________________________________________________    Chief Complaint   Right knee pain    History is obtained from the patient    History of Present Illness   Mitali Richardson is a 89 year old female with past medical history of COPD (O2 independent) dementia and IDDM, presenting to the ER with complaint of right knee pain. Due to  patient's dementia, no direct history can be obtained. Per report, the patient as recently placed in memory care facility. A few days ago, the patient fell and hurt the right knee and she was given morphine at that facility. Concerning about the pain, patient's daughter brought her here for further evaluation.      In our ER,  the patient was mounting fever at 102F, BP soft at 106/59.   Labs came back with low magnesium at 1.3, UA came back positive. Images showed no acute injuries or source of infection. IV ceftriaxone given.    Review of Systems    Unable to obtain due to patient dementia.    Past Medical History    I have reviewed this patient's medical history and updated it with pertinent information if needed.   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  Past Surgical History:   Procedure Laterality Date     LAPAROSCOPIC CHOLECYSTECTOMY WITH CHOLANGIOGRAMS N/A 10/31/2015    Procedure: LAPAROSCOPIC CHOLECYSTECTOMY WITH CHOLANGIOGRAMS;  Surgeon: Kathleen Clark MD;  Location: [facility] OR       Social History   I have reviewed this patient's social history and updated it with pertinent information if needed.  Social History     Tobacco Use     Smoking status: Former     Types: Cigarettes     Smokeless tobacco: Never   Substance Use Topics     Alcohol use: No     Drug use: No       Family History     No significant family history, including no history of:     Prior to Admission Medications   Prior to Admission Medications   Prescriptions Last Dose Informant Patient Reported? Taking?   DEEP SEA NASAL SPRAY 0.65 % nasal spray  Nursing Home Yes No   Sig: Spray 2 sprays in nostril every 2 hours as needed   LORazepam (ATIVAN) 0.5 MG tablet  Nursing Home Yes Yes   Sig: Take 0.5 mg by  mouth every 4 hours as needed for anxiety or agitation   acetaminophen (TYLENOL) 500 MG tablet 3/11/2023 at 1300 Nursing Home Yes Yes   Sig: Take 500 mg by mouth 4 times daily as needed for pain   albuterol (PROAIR HFA) 108 (90 BASE) MCG/ACT inhaler  alf Yes Yes   Sig: Inhale 2 puffs into the lungs every 4 hours as needed for shortness of breath or wheezing   bacitracin 500 UNIT/GM external ointment  Nursing Home Yes No   Sig: Apply topically 2 times daily Apply to wound bed twice daily on right great toe. Place gauze to cover and separate toes from rubbing. Wrap dressing with Kerlix to hold in place.   camphor-menthol (DERMASARRA) 0.5-0.5 % external lotion  Nursing Home Yes No   Sig: Apply topically 2 times daily   carboxymethylcellulose PF (REFRESH PLUS) 0.5 % ophthalmic solution  alf Yes Yes   Sig: Place 1 drop into both eyes every 4 hours as needed for dry eyes   diclofenac (VOLTAREN) 1 % topical gel  Nursing Home Yes No   Sig: Apply 4 g topically 2 times daily Apply to right ankle.   insulin glargine (LANTUS PEN) 100 UNIT/ML pen 3/10/2023 at  Nursing Home No Yes   Sig: Inject 7 Units Subcutaneous At Bedtime   Patient taking differently: Inject 7 Units Subcutaneous At Bedtime   insulin pen needle (BD AUTOSHIELD DUO) 30G X 5 MM  Nursing Home Yes No   Sig: USE TO INJECT INSULIN ONCE DAILY   loperamide (IMODIUM) 2 MG capsule  Nursing Home Yes Yes   Sig: Take 2 mg by mouth 4 times daily as needed for diarrhea   miconazole (MICATIN) 2 % external powder 3/11/2023 at 0800 Nursing Home Yes Yes   Sig: Apply topically 2 times daily For redness or irritation   miconazole with skin protectant (NALDO ANTIFUNGAL) 2 % CREA cream  Nursing Home Yes No   Sig: Apply to sore on buttocks as needed.   mometasone (NASONEX) 50 MCG/ACT nasal spray 3/11/2023 at 0800 Nursing Home Yes Yes   Sig: Spray 1 spray into both nostrils daily   morphine (MS CONTIN) 15 MG CR tablet 3/11/2023 at 1200 Nursing Home Yes Yes   Sig:  Take 15 mg by mouth 2 times daily   morphine 2.5 MG solu-tab  Nursing Home Yes Yes   Sig: Take 2.5 mg by mouth every 8 hours as needed for shortness of breath or pain   nystatin (MYCOSTATIN) 145747 UNIT/GM external powder  Nursing Home Yes No   Sig: Apply twice daily to groin folds.   omeprazole (PRILOSEC) 10 MG DR capsule 3/11/2023 at 0800 Nursing Home Yes Yes   Sig: Take 10 mg by mouth daily      Facility-Administered Medications: None     Allergies   Allergies   Allergen Reactions     Amoxicillin Anaphylaxis and Hives     Penicillins Anaphylaxis and Hives     Pregabalin Other (See Comments)     Other reaction(s): Sedation       Ceclor  [Cefaclor]      Clarithromycin      Codeine      Other reaction(s): Chest Pain     Doxycycline Hives     Floxin [Ofloxacin]      Has tolerated cipro in the past     Glyburide      Patient doesn't know what allergy or reaction she's had. Might potentially be due to sulfa allergy.     Metformin Other (See Comments)     Other reaction(s): lactic acidosis     Morphine Nausea and Vomiting     Strawberry Hives     Sulfa Drugs Unknown     Shakiness/sweating     Tequin      Other reaction(s): Tremors     Cephalexin Rash     Tolerated ceftriaxone 7/24/22     Flonase [Fluticasone]      Bloody nose       Physical Exam   Vital Signs: Temp: 99  F (37.2  C) Temp src: Oral BP: 111/56 Pulse: 94   Resp: 20 SpO2: (!) 87 % O2 Device: None (Room air)    Weight: 133 lbs 11.2 oz      GENERAL: Patient is dementia.  HEENT: Nonicteric sclerae, PERRLA, EOMI. Oropharynx clear. Moist mucous membranes. Conjunctivae appear well perfused.  HEART: Regular rate and rhythm without murmurs. No lower extremities edema.  LUNGS: Clear to auscultation bilaterally. No wheezing, crackles or rhonchi  ABDOMEN: Soft, positive bowel sounds, nontender.  SKIN: Diabetic foot ulcer but does not look to be infected.  NEUROLOGIC: Alert and disoriented.  Cranial nerves II-XII intact without motor/sensory deficit.      Data      Recent Labs   Lab 03/11/23  1658   WBC 9.1   HGB 11.8   MCV 89         POTASSIUM 4.4   CHLORIDE 100   CO2 25   BUN 16.1   CR 0.77   ANIONGAP 12   ALONSO 8.4   GLC 99   ALBUMIN 3.3*   PROTTOTAL 6.3*   BILITOTAL 0.7   ALKPHOS 81   ALT 10   AST 20         Recent Results (from the past 24 hour(s))   XR Chest Port 1 View    Narrative    EXAM: XR CHEST PORT 1 VIEW  LOCATION: [facility]  DATE/TIME: 3/11/2023 5:54 PM    INDICATION: fever  COMPARISON: None.      Impression    IMPRESSION: Normal size heart. Tortuous atherosclerotic aorta. No pneumothorax or pleural effusion. No focal consolidation. Mild pulmonary vascular congestion.   Foot  XR, G/E 3 views, right    Narrative    EXAM: XR FOOT RIGHT G/E 3 VIEWS  LOCATION: [facility]  DATE/TIME: 3/11/2023 5:54 PM    INDICATION: Right foot ulceration.  COMPARISON: 07/24/2022.      Impression    IMPRESSION:   1.  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  2.  No evidence of fracture or focal bone erosion.  3.  Scattered degenerative changes in the hindfoot, midfoot, and forefoot.  4.  Moderate bone demineralization.  5.  Atherosclerotic calcification.    XR Knee Right 3 Views    Narrative    EXAM: XR KNEE RIGHT 3 VIEWS  LOCATION: [facility]  DATE/TIME: 3/11/2023 5:54 PM    INDICATION: Right knee pain after a fall.  COMPARISON: None.      Impression    IMPRESSION:   1.  No fracture or joint malalignment.  2.  Small knee joint effusion.  3.  Moderate bone demineralization.  4.  Atherosclerotic calcification.

## 2023-03-12 NOTE — PROGRESS NOTES
S-(situation): Patient registered to Observation. Patient arrived t room 265 at 2130 via cart from ER    B-(background): Patient comes from an assisted living in [facility]. She fell 2 days ago and has been complaining of right knee pain since then. Patient's daughter, Reta, requested for her to been taken to the hospital (she is not the patient's POA). Her urine tested positive for UTI.     A-(assessment): Patient alert to self only. Mostly uncooperative and uses foul language. Refused assessment. Daughter helped with settling the patient down. O2 sats at 83-87% but refused nasal cannula. Purewick in place.     R-(recommendations): Orders and observation goals reviewed with patient and daughter.     Nursing Observation criteria listed below was met:    Skin issues/needs documented:No, refused assessment  Isolation needs addressed and Signage up: NA  Fall Prevention: Education given and documented: No, uncooperative  Education Assessment documented:Yes  Admission Education Documented: Yes  New medication patient education completed and documented (Possible Side Effects of Common Medications handout): Yes  OBS video/handout Reviewed & Documented: No  Allergies Reviewed: Yes  Medication Reconciliation Complete: Yes  Home medications if not able to send immediately home with family stored here: NA  Reminder note placed in discharge instructions of home meds: NA  Patient has discharge needs (If yes, please explain): Yes,   Patient discharge preferences addressed and charted on white board:  No, family is still deciding which facility to transfer the patient to.   Provider notified that patient has arrived to the unit: Yes

## 2023-03-12 NOTE — PROGRESS NOTES
03/12/23 0936   Appointment Info   Signing Clinician's Name / Credentials (PT) Kriss Aviles, PT, DPT   Rehab Comments (PT) senior care - recent fall w/R knee/LE pain       Present no   Language english   Living Environment   People in Home other (see comments)  ([facility])   Current Living Arrangements assisted living  (memory care)   Transportation Anticipated   (unclear per pt, will need to follow up with family)   Living Environment Comments Pt lives in senior care in [facility], daughter reports concerns of care there. Pt states that she likes it there. Unclear per pt report of how much assistance is provided at baseline   Self-Care   Usual Activity Tolerance poor   Current Activity Tolerance poor   Regular Exercise No   Equipment Currently Used at Home walker, rolling   Fall history within last six months yes   Number of times patient has fallen within last six months 2   General Information   Onset of Illness/Injury or Date of Surgery 03/11/23   Referring Physician Pedro Santos MD   Patient/Family Therapy Goals Statement (PT) improve enough to return to an [facility]   Pertinent History of Current Problem (include personal factors and/or comorbidities that impact the POC) Pt is a 89 year old female with a past medical history including: dementia, COPD, diabetes w/PND and PVD, CVA, GERD, hyperlipidemia, and OA. She presented to the ED on 3/11/23 with her daughter. Daughter reported that she fell at her senior care some time last week and is now reporting R knee/leg pain that is reducing her mobility. Pt had a fever and was found to have a UTI upon hospital admission. At baseline, pt ambulates short distances with her walker.   Existing Precautions/Restrictions fall   Weight-Bearing Status - LUE full weight-bearing   Weight-Bearing Status - RUE full weight-bearing   Weight-Bearing Status - LLE full weight-bearing   Weight-Bearing Status - RLE full weight-bearing   General Observations PT orders: 3/11/23. Eval and treat  for mobility/falls and safe d/c rec. Activity orders: ambulate w/assist   Cognition   Affect/Mental Status (Cognition) WFL  (mildly upset at times with increased confusion but overall pleasant during eval)   Orientation Status (Cognition) oriented to;person;verbal cues/prompts needed for orientation   Follows Commands (Cognition) follows one-step commands;50-74% accuracy;delayed response/completion;increased processing time needed;repetition of directions required;verbal cues/prompting required   Safety Deficit (Cognition) problem-solving   Memory Deficit (Cognition)   (baseline dementia)   Pain Assessment   Patient Currently in Pain Yes, see Vital Sign flowsheet   Integumentary/Edema   Integumentary/Edema Comments bruises and scrates noted along B LEs   Posture    Posture Kyphosis;Protracted shoulders   Range of Motion (ROM)   Range of Motion ROM is WFL   ROM Comment general decrease in ROM, most notably in R knee due to pain   Strength (Manual Muscle Testing)   Strength (Manual Muscle Testing) Deficits observed during functional mobility   Strength Comments general weakness and deconditioing noted from minimal activity. Requires assistance for functional mobility.   Bed Mobility   Bed Mobility scooting/bridging;supine-sit;sit-supine   Scooting/Bridging [place] (Bed Mobility) moderate assist (50% patient effort);2 person assist   Supine-Sit [place] (Bed Mobility) minimum assist (75% patient effort)   Sit-Supine [place] (Bed Mobility) minimum assist (75% patient effort)   Bed Mobility Limitations cognitive deficits;decreased ability to use legs for bridging/pushing   Impairments Contributing to Impaired Bed Mobility pain;decreased strength   Assistive Device (Bed Mobility) bed rails   Comment, (Bed Mobility) required extra time to complete   Transfers   Transfers sit-stand transfer;toilet transfer   Maintains Weight-bearing Status (Transfers) able to maintain   Transfer Safety Concerns Noted decreased  balance during turns;decreased step length   Impairments Contributing to Impaired Transfers impaired balance;pain;decreased strength   Sit-Stand Transfer   Sit-Stand [place] (Transfers) moderate assist (50% patient effort);2 person assist   Assistive Device (Sit-Stand Transfers) walker, front-wheeled   Comment, (Sit-Stand Transfer) Hesitant and scared to stand, pain in R knee   Toilet Transfer   [place] Level (Toilet Transfer) moderate assist (50% patient effort);2 person assist   Assistive Device (Toilet Transfer) commode chair;walker, front-wheeled   Type (Toilet Transfer) sit-stand   Comment, (Toilet Transfer) 2 person assist to help guide and manage lines   Gait/Stairs (Locomotion)   [place] Level (Gait) moderate assist (50% patient effort);2 person assist   Assistive Device (Gait) walker, front-wheeled   Distance in Feet 3ft   Deviations/Abnormal Patterns (Gait) [place] decreased;gait speed decreased;stride length decreased   Balance   Balance Comments Pt required FWW and Adore for standing balance, partially limited by R LE pain and weakness   Muscle Tone   Muscle Tone no deficits were identified   Clinical Impression   Criteria for Skilled Therapeutic Intervention Yes, treatment indicated   PT Diagnosis (PT) generalized deconditioning with weakness, impaired bed mobility, impaired transfers, and decreased mobility   Influenced by the following impairments generalized deconditioning with weakness, impaired bed mobility, impaired transfers, and decreased mobility   Functional limitations due to impairments bed mobility, transfers, ambulation   Clinical Presentation (PT Evaluation Complexity) Stable/Uncomplicated   Clinical Presentation Rationale based on history, eval, and clinical reasoning   Clinical Decision Making (Complexity) moderate complexity   Planned Therapy Interventions (PT) balance training;bed mobility training;gait training;home exercise program;neuromuscular  re-education;patient/family education;ROM (range of motion);stair training;strengthening;stretching;transfer training;progressive activity/exercise;risk factor education;home program guidelines  (as needed)   Anticipated Equipment Needs at Discharge (PT)   (none)   Risk & Benefits of therapy have been explained evaluation/treatment results reviewed;care plan/treatment goals reviewed;risks/benefits reviewed;current/potential barriers reviewed;participants voiced agreement with care plan;participants included;patient   Clinical Impression Comments Pt is mobilizing below her baseline. She currently needs Adore x1 for bed mobility. ModA x2 for sit <> stand transfers and modA x2 for step transfer to commode. She requires incresed time to complete tasks and frequent verbal cues for hand placement and directions. Pt comes from memory care in assisted living facility. Provided they are able to support her at her baseline mobility, anticipate that with improved medical status following resolution of UTI, pt will return to baseline mobility and be able to return to her prior living environment with necessary cares.   PT Total Evaluation Time   PT Eval, Moderate Complexity Minutes (07238) 20   Physical Therapy Goals   PT Frequency 5x/week   PT Predicted Duration/Target Date for Goal Attainment 03/16/23   PT Goals Bed Mobility;Transfers;Gait   PT: Bed Mobility Modified independent;Supine to/from sit;Rolling   PT: Transfers Minimal assist;Sit to/from stand;Bed to/from chair;Assistive device   PT: Gait Minimal assist;Standard walker;10 feet   Interventions   Interventions Quick Adds Therapeutic Activity   Therapeutic Activity   Therapeutic Activities: dynamic activities to improve functional performance Minutes (37964) 15   Symptoms Noted During/After Treatment Fatigue;Increased pain   Treatment Detail/Skilled Intervention Completed stand step transfer from bed to commode using FWW and ModA x2. She required frequent cues and  encouragement to complete task. Partially limited by R LE pain from recent fall. Allowed pt time to sit on commode, fair seated balance. Stand step transfer back to bed w/ModA x2 and FWW. Pt needing Adore to lift LEs back into bed and to get repositioned properly. Pt left supine in bed w/pillows under LEs to off load heels and pillows along L side to off load L buttocks. All needs within reach.   PT Discharge Planning   PT Plan continue progressing mobility and strength   PT Discharge Recommendation (DC Rec) Long term care facility  (return to prior [facility] given they are providing enough support)   PT Rationale for DC Rec Pt is mobilizing below her baseline. She currently needs Adore x1 for bed mobility. ModA x2 for sit <> stand transfers and modA x2 for step transfer to commode. She requires incresed time to complete tasks and frequent verbal cues for hand placement and directions. Pt comes from memory care in assisted living facility. Provided they are able to support her at her baseline mobility, anticipate that with improved medical status following resolution of UTI, pt will return to baseline mobility and be able to return to her prior living environment with necessary cares.   PT Brief overview of current status Adore x1 for bed mobility, ModAx2 for sit to stand w/FWW. ModAx2 for stand step transfer to commode w/FWW.   Total Session Time   Timed Code Treatment Minutes 15   Total Session Time (sum of timed and untimed services) 35     Kriss Aviles, PT, DPT  [phone]  [facility]  Thank you for this referral
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
## 2023-03-12 NOTE — ED PROVIDER NOTES
"  History     Chief Complaint   Patient presents with     Knee Pain     HPI  History per EMS, patient's daughter, medical records, care facility    This is a 89-year-old female, history of dementia, COPD, diabetes with peripheral neuropathy and peripheral vascular disease, CVA, GERD, other history as below presenting with knee pain.  Patient apparently was moved to a new assisted living care facility in [facility] to be near her to one of her daughters in February.  The daughter who is giving the history states that at some point in the last week patient fell.  Patient was unable to get a consistent answer as to when or how she fell but she has been complaining of right leg/knee pain.  She reportedly usually can bear weight and walk with a walker but she has not been getting up because of the pain.  Her daughter is concerned for the care at the facility.  She states that there is a hospice worker who came in to give the patient a bath yesterday but prior to that she had not bathed did.  She has been itching at her skin.  She has not been herself for the last couple of days.  Her daughter was unable to give much else for recent issues except for the fall and knee pain.  She reports that the patient has COPD and uses an inhaler.  She also reports that patient had an injury to her right great toe, it reportedly \"turned black and fell off\".  The care facility is doing some kind of daily wraps on the leg and foot and patient also has some compression stocking on the left leg for edema.    Allergies:  Allergies   Allergen Reactions     Amoxicillin Anaphylaxis and Hives     Penicillins Anaphylaxis and Hives     Pregabalin Other (See Comments)     Other reaction(s): Sedation       Ceclor  [Cefaclor]      Clarithromycin      Codeine      Other reaction(s): Chest Pain     Doxycycline Hives     Floxin [Ofloxacin]      Has tolerated cipro in the past     Glyburide      Patient doesn't know what allergy or reaction she's had. " Might potentially be due to sulfa allergy.     Metformin Other (See Comments)     Other reaction(s): lactic acidosis     Morphine Nausea and Vomiting     Strawberry Hives     Sulfa Drugs Unknown     Shakiness/sweating     Tequin      Other reaction(s): Tremors     Cephalexin Rash     Tolerated ceftriaxone 7/24/22     Flonase [Fluticasone]      Bloody nose       Problem List:    Patient Active Problem List    Diagnosis Date Noted     Cellulitis of right lower extremity 07/24/2022     Priority: Medium     Toe infection 07/24/2022     Priority: Medium     Hyperlipidemia 09/10/2021     Priority: Medium     Osteoarthritis 09/10/2021     Priority: Medium     Other diseases of lung, not elsewhere classified 09/10/2021     Priority: Medium     Formatting of this note might be different from the original.  Reviewed CT scan from 4-06.    No significant change from 11-03.    Likely benign given stability.    May 2016: The 11 mm nodule within the medial aspect of   the right lower lobe has remained stable dating back to 4/9/2014.       Vitamin B6 deficiency 10/27/2020     Priority: Medium     Age-related osteoporosis without current pathological fracture 02/12/2020     Priority: Medium     Obstructive sleep apnea (adult) (pediatric) 04/25/2019     Priority: Medium     Senile dementia without behavioral disturbance (H) 04/22/2019     Priority: Medium     Diabetic peripheral neuropathy (H) 03/13/2019     Priority: Medium     PVD (peripheral vascular disease) (H) 03/13/2019     Priority: Medium     Lung nodule 03/07/2019     Priority: Medium     Iron deficiency anemia 08/27/2018     Priority: Medium     Hypomagnesemia 07/03/2018     Priority: Medium     Vitamin D deficiency 07/03/2018     Priority: Medium     Sensorineural hearing loss (SNHL), bilateral 08/01/2017     Priority: Medium     CVA (cerebral vascular accident) (H) 12/22/2015     Priority: Medium     Cholecystitis 10/30/2015     Priority: Medium     ACP (advance care  planning) 12/27/2014     Priority: Medium     Formatting of this note might be different from the original.  Patient has identified Health Care Agent(s): No  Add Health Care Agents: No  Patient has Advance Care Plan Documents (Health Care Directive, POLST): No.    Patient has identified Specific Treatment Preferences: Yes   Specific Treatment Preferences: a.) Code Status:  CPR/Attempt Resuscitation       Maxillary sinus cyst 03/03/2014     Priority: Medium     Deviated nasal septum 03/03/2014     Priority: Medium     Hx of antibiotic allergy 03/03/2014     Priority: Medium     Dizziness 03/03/2014     Priority: Medium     Use of rolling walker as ambulatory aid 03/03/2014     Priority: Medium     Type 2 diabetes, HbA1c goal < 7% (H) 01/15/2014     Priority: Medium     Chronic obstructive pulmonary disease, unspecified COPD type (H) 01/15/2014     Priority: Medium     Abdominal pain 09/15/2008     Priority: Medium     Formatting of this note might be different from the original.  W/ loose stool & elevated lactate. CT showed no definite diverticulitis       Alcohol abuse 08/08/2006     Priority: Medium     Formatting of this note might be different from the original.  History of alcohol abuse, sober X decades.       GERD without esophagitis 08/08/2006     Priority: Medium     Tobacco use disorder 08/08/2006     Priority: Medium     Pruritic disorder 04/01/2006     Priority: Medium     Diverticulitis of colon 10/28/2005     Priority: Medium     Formatting of this note might be different from the original.  COLON RESECTION,7/2000       Health Care Home 09/10/2021     Priority: Low        Past Medical History:    Past Medical History:   Diagnosis Date     COPD (chronic obstructive pulmonary disease) (H)      Diabetes mellitus (H)        Past Surgical History:    Past Surgical History:   Procedure Laterality Date     LAPAROSCOPIC CHOLECYSTECTOMY WITH CHOLANGIOGRAMS N/A 10/31/2015    Procedure: LAPAROSCOPIC CHOLECYSTECTOMY  "WITH CHOLANGIOGRAMS;  Surgeon: Kathleen Clark MD;  Location: [facility] OR       Family History:    No family history on file.    Social History:  Marital Status:   [5]  Social History     Tobacco Use     Smoking status: Former     Types: Cigarettes     Smokeless tobacco: Never   Substance Use Topics     Alcohol use: No     Drug use: No        Medications:    acetaminophen (TYLENOL) 500 MG tablet  albuterol (PROAIR HFA) 108 (90 BASE) MCG/ACT inhaler  carboxymethylcellulose PF (REFRESH PLUS) 0.5 % ophthalmic solution  insulin glargine (LANTUS PEN) 100 UNIT/ML pen  loperamide (IMODIUM) 2 MG capsule  LORazepam (ATIVAN) 0.5 MG tablet  miconazole (MICATIN) 2 % external powder  mometasone (NASONEX) 50 MCG/ACT nasal spray  morphine (MS CONTIN) 15 MG CR tablet  morphine 2.5 MG solu-tab  omeprazole (PRILOSEC) 10 MG DR capsule  bacitracin 500 UNIT/GM external ointment  camphor-menthol (DERMASARRA) 0.5-0.5 % external lotion  DEEP SEA NASAL SPRAY 0.65 % nasal spray  diclofenac (VOLTAREN) 1 % topical gel  insulin pen needle (BD AUTOSHIELD DUO) 30G X 5 MM  miconazole with skin protectant (NALDO ANTIFUNGAL) 2 % CREA cream  nystatin (MYCOSTATIN) 050441 UNIT/GM external powder          Review of Systems   Unable to get full review of systems.  Patient would not answer most of my questions.    Physical Exam   BP: (!) 146/74  Pulse: 84  Temp: (!) 102  F (38.9  C)  Resp: 20  Weight: 60.6 kg (133 lb 11.2 oz)  SpO2: 96 %      Physical Exam  Vitals and nursing note reviewed.   Constitutional:       Comments: Elderly female sitting in the bed.  She vacillates between crying out for help, being angry that we are trying to \"kill her\" and being tearful and angry.   HENT:      Head: Normocephalic.      Nose: Nose normal.      Mouth/Throat:      Comments: Tacky mucous membranes  Eyes:      Extraocular Movements: Extraocular movements intact.      Conjunctiva/sclera: Conjunctivae normal.   Cardiovascular:      Rate and Rhythm: Regular " rhythm. Tachycardia present.      Pulses: Normal pulses.      Heart sounds: Normal heart sounds.   Pulmonary:      Comments: No obvious adventitious breath sounds  Abdominal:      Comments: Patient has a couple of different hernias in the lower abdomen   Musculoskeletal:      Cervical back: Normal range of motion and neck supple.      Comments: Left leg with edema, no obvious open lesions.  Right knee is swollen.  She has tenderness to palpation and a small effusion.  She is reluctant to do any range of motion.  Very mild erythema of the calf with a crusting superficial area on the posterior portion.  Please see picture.  She also has some yellow crusting on the medial plantar portion of her right foot and a partial amputation of her right great toe with some crusting.  Please see pictures.  The foot and leg are a little bit edematous and very slightly pink.   Skin:     Comments: Patient has excoriations all over her body.  Please see some of the pictures on the leg   Neurological:      Mental Status: She is alert. She is disoriented.      Comments: According to daughter, patient is more confused than usual   Psychiatric:      Comments: Patient agitated, angry or tearful, lashing out, cursing                             ED Course  (with Medical Decision Making)    Pt seen and examined by me.  RN and EPIC notes reviewed.       Elderly patient presenting with her daughter who primarily was concerned because she fell and had some knee swelling and was not bearing weight.  Patient's care facility was in [place] and patient was brought by EMS to this facility because her daughter works here.  On exam, patient is febrile with temperature 102.  This was rechecked a couple of times.  She is confused, agitated.  She has some hernias.  Her right lower extremity shows swelling of the right knee, some swelling of the calf and foot with some diabetic foot ulcers that are dry.  She had a amputation of her right great toe.    I  am not sure if patient's symptoms are primarily because she has a fever and infection or if there is other causes.  Plan IV, fluids, labs, blood cultures, chest x-ray, check urine, inflammatory markers, lactate.  I am going to give her Tylenol.    We were finally able to get an IV.  We did have to put a board on the patient's arm as she continued to bend the arm and was not getting fluids.  COVID swab was done and negative.  Blood cultures pending.  Patient has normal white count at 9.1, normal hemoglobin and platelets.  She does not have elevated lactic acid.  CMP is normal.  ESR is normal but CRP elevated at 28.34.  Magnesium is low at 1.3.  Replacement ordered.    Portable chest x-ray shows no evidence for infiltrates.  I did an x-ray of her right knee.  There is no evidence for fracture or joint malalignment.  She has a small effusion.  Demineralization atherosclerotic calcification noted.  Foot x-ray was done.  Patient has a partial first toe amputation at the level of the proximal phalanx head with no evidence for fracture or focal bone erosion.  She has a lot of degenerative changes.    We had a significant delay in getting her urine.  I feel that either she has a UTI causing her fever or her symptoms are from the foot with possible infection they are causing her symptoms.  I elected to give her 1 dose of ceftriaxone IV.    I think patient's current and aftercare will need much further in-depth evaluation and management.  I am going to have her admitted to the hospitalist service, currently she meets criteria for observation.  Will need  input for final disposition when she is improved.  Of note, patient's fever came down, she ate some dinner and she was much more calm, interactive.  I spoke with the hospitalist.  He will follow-up with patient's urine results and decide if any further antibiotics or evaluation need to be made.  Patient stable.      Procedures      Results for orders placed or  performed during the hospital encounter of 03/11/23 (from the past 24 hour(s))   Symptomatic Influenza A/B, RSV, & SARS-CoV2 PCR (COVID-19) Nose    Specimen: Nose; Swab   Result Value Ref Range    Influenza A PCR Negative Negative    Influenza B PCR Negative Negative    RSV PCR Negative Negative    SARS CoV2 PCR Negative Negative    Narrative    Testing was performed using the Xpert Xpress CoV2/Flu/RSV Assay on the [employer] GeneXpert Instrument. This test should be ordered for the detection of SARS-CoV-2, influenza, and RSV viruses in individuals who meet clinical and/or epidemiological criteria. Test performance is unknown in asymptomatic patients. This test is for in vitro diagnostic use under the FDA EUA for laboratories certified under CLIA to perform high or moderate complexity testing. This test has not been FDA cleared or approved. A negative result does not rule out the presence of PCR inhibitors in the specimen or target RNA in concentration below the limit of detection for the assay. If only one viral target is positive but coinfection with multiple targets is suspected, the sample should be re-tested with another FDA cleared, approved, or authorized test, if coinfection would change clinical management. This test was validated by the [facility]. These laboratories are certified under the Clinical Laboratory Improvement Amendments of 1988 (CLIA-88) as qualified to perform high complexity laboratory testing.   CBC with platelets differential    Narrative    The following orders were created for panel order CBC with platelets differential.  Procedure                               Abnormality         Status                     ---------                               -----------         ------                     CBC with platelets and d...[[id number]]  Abnormal            Final result                 Please view results for these tests on the individual orders.   Lactic acid whole blood    Result Value Ref Range    Lactic Acid 1.4 0.7 - 2.0 mmol/L   Comprehensive metabolic panel   Result Value Ref Range    Sodium 137 136 - 145 mmol/L    Potassium 4.4 3.4 - 5.3 mmol/L    Chloride 100 98 - 107 mmol/L    Carbon Dioxide (CO2) 25 22 - 29 mmol/L    Anion Gap 12 7 - 15 mmol/L    Urea Nitrogen 16.1 8.0 - 23.0 mg/dL    Creatinine 0.77 0.51 - 0.95 mg/dL    Calcium 8.4 8.2 - 9.6 mg/dL    Glucose 99 70 - 99 mg/dL    Alkaline Phosphatase 81 35 - 104 U/L    AST 20 10 - 35 U/L    ALT 10 10 - 35 U/L    Protein Total 6.3 (L) 6.4 - 8.3 g/dL    Albumin 3.3 (L) 3.5 - 5.2 g/dL    Bilirubin Total 0.7 <=1.2 mg/dL    GFR Estimate 72 >60 mL/min/1.73m2   Erythrocyte sedimentation rate auto   Result Value Ref Range    Erythrocyte Sedimentation Rate 13 0 - 30 mm/hr   CRP inflammation   Result Value Ref Range    CRP Inflammation 28.34 (H) <5.00 mg/L   Magnesium   Result Value Ref Range    Magnesium 1.3 (L) 1.7 - 2.3 mg/dL   CBC with platelets and differential   Result Value Ref Range    WBC Count 9.1 4.0 - 11.0 10e3/uL    RBC Count 4.21 3.80 - 5.20 10e6/uL    Hemoglobin 11.8 11.7 - 15.7 g/dL    Hematocrit 37.4 35.0 - 47.0 %    MCV 89 78 - 100 fL    MCH 28.0 26.5 - 33.0 pg    MCHC 31.6 31.5 - 36.5 g/dL    RDW 16.3 (H) 10.0 - 15.0 %    Platelet Count 181 150 - 450 10e3/uL    % Neutrophils 81 %    % Lymphocytes 10 %    % Monocytes 7 %    % Eosinophils 1 %    % Basophils 1 %    % Immature Granulocytes 0 %    NRBCs per 100 WBC 0 <1 /100    Absolute Neutrophils 7.4 1.6 - 8.3 10e3/uL    Absolute Lymphocytes 0.9 0.8 - 5.3 10e3/uL    Absolute Monocytes 0.7 0.0 - 1.3 10e3/uL    Absolute Eosinophils 0.1 0.0 - 0.7 10e3/uL    Absolute Basophils 0.1 0.0 - 0.2 10e3/uL    Absolute Immature Granulocytes 0.0 <=0.4 10e3/uL    Absolute NRBCs 0.0 10e3/uL   Extra Tube    Narrative    The following orders were created for panel order Extra Tube.  Procedure                               Abnormality         Status                     ---------                                -----------         ------                     Extra Blue Top Tube[[id number]]                              Final result                 Please view results for these tests on the individual orders.   Extra Blue Top Tube   Result Value Ref Range    Hold Specimen JIC    XR Chest Port 1 View    Narrative    EXAM: XR CHEST PORT 1 VIEW  LOCATION: [facility]  DATE/TIME: 3/11/2023 5:54 PM    INDICATION: fever  COMPARISON: None.      Impression    IMPRESSION: Normal size heart. Tortuous atherosclerotic aorta. No pneumothorax or pleural effusion. No focal consolidation. Mild pulmonary vascular congestion.   Foot  XR, G/E 3 views, right    Narrative    EXAM: XR FOOT RIGHT G/E 3 VIEWS  LOCATION: [facility]  DATE/TIME: 3/11/2023 5:54 PM    INDICATION: Right foot ulceration.  COMPARISON: 07/24/2022.      Impression    IMPRESSION:   1.  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  2.  No evidence of fracture or focal bone erosion.  3.  Scattered degenerative changes in the hindfoot, midfoot, and forefoot.  4.  Moderate bone demineralization.  5.  Atherosclerotic calcification.    XR Knee Right 3 Views    Narrative    EXAM: XR KNEE RIGHT 3 VIEWS  LOCATION: [facility]  DATE/TIME: 3/11/2023 5:54 PM    INDICATION: Right knee pain after a fall.  COMPARISON: None.      Impression    IMPRESSION:   1.  No fracture or joint malalignment.  2.  Small knee joint effusion.  3.  Moderate bone demineralization.  4.  Atherosclerotic calcification.        Medications   0.9% sodium chloride BOLUS (0 mLs Intravenous Stopped 3/11/23 1952)     Followed by   sodium chloride 0.9% infusion (has no administration in time range)   cefTRIAXone (ROCEPHIN) 2 g vial to attach to  ml bag for ADULTS or NS 50 ml bag for PEDS (2 g Intravenous $New Bag 3/11/23 1949)   albuterol (PROVENTIL HFA/VENTOLIN HFA)  inhaler (has no administration in time range)   acetaminophen (TYLENOL) tablet 1,000 mg (1,000 mg Oral $Given 3/11/23 1712)   magnesium sulfate 4 g in 100 mL D5W intermittent infusion (4 g Intravenous $New Bag 3/11/23 1751)       Assessments & Plan     I have reviewed the findings, diagnosis, plan with the patient's daughter    New Prescriptions    No medications on file       Final diagnoses:   Fever, unspecified fever cause   Knee injury, right, initial encounter   Agitation   Hypomagnesemia   Diabetic ulcer of right foot associated with type 2 diabetes mellitus, unspecified part of foot, unspecified ulcer stage (H) - great toe and instep     Disposition: Patient admitted to the hospitalist service in stable condition.    Note: Chart documentation done in part with Dragon Voice Recognition software. Although reviewed after completion, some word and grammatical errors may remain.     3/11/2023   [facility] EMERGENCY DEPT     Carolina Eric MD  03/12/23 0008

## 2023-03-13 NOTE — PLAN OF CARE
"PRIMARY DIAGNOSIS: \"GENERIC\" NURSING   OUTPATIENT/OBSERVATION GOALS TO BE MET BEFORE DISCHARGE:  1. ADLs back to baseline: Yes    2. Activity and level of assistance: Up with standby assistance. A1-2, gait belt and walker    3. Pain status: Pain free.    4. Return to near baseline physical activity: Yes     Discharge Planner Nurse   Safe discharge environment identified: No  Barriers to discharge: Yes family voiced concerns 3/12/23 of wanting pt to discharge to a different facility.  following.   Vss. Pt has discomfort to BLE when repositioned, no pain when at rest. Pt resistive with cares and at beginning of night, more cooperative towards this morning. Oriented to self only.  A1 with gait belt and walker to the bedside commode. Incontinent and voided. Wounds to coccyx, bottom R foot, R great toe scabbed/calloused.        Entered by: Jayda Medel RN 03/13/2023 4:41 AM     Please review provider order for any additional goals.   Nurse to notify provider when observation goals have been met and patient is ready for discharge.Goal Outcome Evaluation:                        "

## 2023-03-13 NOTE — PROGRESS NOTES
PRIMARY DIAGNOSIS: GENERALIZED WEAKNESS    OUTPATIENT/OBSERVATION GOALS TO BE MET BEFORE DISCHARGE  1. Orthostatic performed: No    2. Tolerating PO medications: Yes    3. Return to near baseline physical activity: No    4. Cleared for discharge by consultants (if involved): No    Discharge Planner Nurse   Safe discharge environment identified: No  Barriers to discharge: Yes       Entered by: Santo Aguilar RN 03/12/2023 11:01 PM     Please review provider order for any additional goals.   Nurse to notify provider when observation goals have been met and patient is ready for discharge.

## 2023-03-13 NOTE — PROGRESS NOTES
"PRIMARY DIAGNOSIS: \"GENERIC\" NURSING  OUTPATIENT/OBSERVATION GOALS TO BE MET BEFORE DISCHARGE:  1. ADLs back to baseline: yes    2. Activity and level of assistance: Up with standby assistance.    3. Pain status: Pain free.    4. Return to near baseline physical activity: No     Discharge Planner Nurse   Safe discharge environment identified: Yes  Barriers to discharge: No       Entered by: Jayda Lacy RN 03/12/2023 8:33 PM     Please review provider order for any additional goals.   Nurse to notify provider when observation goals have been met and patient is ready for discharge.      "

## 2023-03-13 NOTE — PLAN OF CARE
Physical Therapy Discharge Summary    Reason for therapy discharge:    Discharged to [facility]    Progress towards therapy goal(s). See goals on Care Plan in [facility] electronic health record for goal details.  Goals partially met.  Barriers to achieving goals:   discharge from facility.    Therapy recommendation(s):    as determined necessary by medical status      Thank you for your referral.  Catia Chaudhary, PT, DPT, ATC, [facility]  O: [phone]  E: [email or web address]

## 2023-03-13 NOTE — PROGRESS NOTES
"PRIMARY DIAGNOSIS: \"GENERIC\" NURSING  OUTPATIENT/OBSERVATION GOALS TO BE MET BEFORE DISCHARGE:  1. ADLs back to baseline: Yes    2. Activity and level of assistance: Up with assist of 1, walker, belt    3. Pain status: Pain free.    4. Return to near baseline physical activity: Yes     Discharge Planner Nurse   Safe discharge environment identified: Social work consulted, TBD.   Barriers to discharge: Yes, MD orders and discharge plan.        Entered by: Ashlyn Foster RN 03/13/2023 11:09 AM     Please review provider order for any additional goals.   Nurse to notify provider when observation goals have been met and patient is ready for discharge.  "

## 2023-03-13 NOTE — PLAN OF CARE
Occupational Therapy: Orders received. Chart reviewed and discussed with care team.? Occupational Therapy not indicated due to patient with known cognitive impairments, from [facility] at baseline. OBS status OT evaluation will not change or alter patient's discharge recommendation.? Defer discharge recommendations to MD.? Will complete orders.     Thank you for your referral.  Catia Chaudhary, PT, DPT, ATC, [facility]  O: [phone]  E: [email or web address]

## 2023-03-13 NOTE — DISCHARGE SUMMARY
[facility]  Hospitalist Discharge Summary      Date of Admission:  3/11/2023  Date of Discharge:  3/13/2023  Discharging Provider: Brodie Beach NP  Discharge Service: Hospitalist Service    Discharge Diagnoses   Urinary tract infection    Follow-ups Needed After Discharge   Follow-up Appointments     Follow Up and recommended labs and tests      Follow up with Nursing home physician.  No follow up labs or test are   needed.             Unresulted Labs Ordered in the Past 30 Days of this Admission     Date and Time Order Name Status Description    3/11/2023  8:15 PM Urine Culture Preliminary     3/11/2023  4:25 PM Blood Culture Arm, Right Preliminary     3/11/2023  4:25 PM Blood Culture Arm, Left Preliminary       These results will be followed up by primary care provider    Discharge Disposition   Discharged to [facility] care facility  Condition at discharge: Stable    Hospital Course      Mitali Richardson is a 89 year old female admitted on 3/11/2023 for UTI, generalized weakness.  She was admitted to the hospital for observation and was found that she had a urinary tract infection within the ED.  She was initiated on ceftriaxone and transition to Bactrim for UTI and possible soft tissue infection within her foot.  She has been afebrile since admission and is stable for discharge.  With patient having a court appointed guardian they have been informed that she is ready for discharge and anticipate her returning to the facility that she previously had been.  Hospital course laid out below:    Urinary tract infection    admit to medical observation.     On arrival patient had a fever of 102F with no clear source of infection     S/P IV ceftriaxone to treat empirically while waiting for UA.     UA did in fact came back positive.     IVF + continue IV ceftriaxone, monitor for sepsis and follow up with blood culture.  Lactic acid was negative while admitted to the hospital    Transition to  Bactrim DS x7 days for UTI    Generalized weakness with underlying dementia  Senile dementia without behavioral disturbance    PT assessment finds patient requiring 1 for bed mobility, 2 for sit and transfers.    Some symptoms may improve with resolution of UTI and soft tissue injury.    Right knee and foot pain.     Unclear etiology.     Knee XR showed no acute findings.     Minimal pain while inpatient, resuming outpatient regimen    PT/OT as above.    Hypomagnesemia.     Mg 1.3 replaced and normalized while here    Insulin-dependent type 2 diabetes melitis     blood glucose now 99,     Resuming home regimen on discharge    Monitor and subcutaneous insulin if necessary.      Consultations This Hospital Stay   PHYSICAL THERAPY ADULT IP CONSULT  OCCUPATIONAL THERAPY ADULT IP CONSULT  CARE MANAGEMENT / SOCIAL WORK IP CONSULT    Code Status   No CPR- Do NOT Intubate    Time Spent on this Encounter   I, Brodie Beach NP, personally saw the patient today and spent greater than 30 minutes discharging this patient.       Brodie Beach NP  [address] MEDICAL SURGICAL  [address] [facility]   [place] [address]  Phone: [phone]  ______________________________________________________________________    Physical Exam   Vital Signs: Temp: 98.7  F (37.1  C) Temp src: Oral BP: (!) 176/81 Pulse: 93   Resp: 18 SpO2: 95 % O2 Device: None (Room air)    Weight: 133 lbs 11.2 oz  Constitutional: awake, cooperative and no apparent distress, forgetful  ENT: normocepalic, without obvious abnormality, atramatic  Respiratory: No increased work of breathing, good air exchange, clear to auscultation bilaterally, no crackles or wheezing  Cardiovascular: normal apical pulses  and normal S1 and S2  GI: normal bowel sounds, non-distended and non-tender  Musculoskeletal: there is no redness, warmth, or swelling of the joints  full range of motion noted  FEET: Right foot within the palmar region has a small point of  inflammation for which serous fluid is leaking.  Neurologic: Mental Status Exam:  Level of Alertness:   awake  Orientation:   person-intermittently  Memory:   abnormal -very forgetful, minimal recollection  Attention/Concentration:  abnormal -easily distracted  Language:  normal       Primary Care Physician   Sherrie Briceno    Discharge Orders      General info for SNF    Length of Stay Estimate: Long Term Care  Condition at Discharge: Stable  Level of care:skilled   Rehabilitation Potential: Poor  Admission H&P remains valid and up-to-date: Yes  Recent Chemotherapy: N/A  Use Nursing Home Standing Orders: Yes     Mantoux instructions    Give two-step Mantoux (PPD) Per Facility Policy Yes     Follow Up and recommended labs and tests    Follow up with Nursing home physician.  No follow up labs or test are needed.     Reason for your hospital stay    Urinary tract infection     Wound care (specify)    Site:   Right foot  Instructions: Keep clean and continue previous dressing changes as ordered previously     Activity - Up with nursing assistance     Fall precautions     Diet    Follow this diet upon discharge: Orders Placed This Encounter  Diabetic consistent carb diet       Significant Results and Procedures   Results for orders placed or performed during the hospital encounter of 03/11/23   XR Chest Port 1 View    Narrative    EXAM: XR CHEST PORT 1 VIEW  LOCATION: [facility]  DATE/TIME: 3/11/2023 5:54 PM    INDICATION: fever  COMPARISON: None.      Impression    IMPRESSION: Normal size heart. Tortuous atherosclerotic aorta. No pneumothorax or pleural effusion. No focal consolidation. Mild pulmonary vascular congestion.   XR Knee Right 3 Views    Narrative    EXAM: XR KNEE RIGHT 3 VIEWS  LOCATION: [facility]  DATE/TIME: 3/11/2023 5:54 PM    INDICATION: Right knee pain after a fall.  COMPARISON: None.      Impression    IMPRESSION:   1.  No fracture or  joint malalignment.  2.  Small knee joint effusion.  3.  Moderate bone demineralization.  4.  Atherosclerotic calcification.    Foot  XR, G/E 3 views, right    Narrative    EXAM: XR FOOT RIGHT G/E 3 VIEWS  LOCATION: [facility]  DATE/TIME: 3/11/2023 5:54 PM    INDICATION: Right foot ulceration.  COMPARISON: 07/24/2022.      Impression    IMPRESSION:   1.  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  2.  No evidence of fracture or focal bone erosion.  3.  Scattered degenerative changes in the hindfoot, midfoot, and forefoot.  4.  Moderate bone demineralization.  5.  Atherosclerotic calcification.    X-ray lt Elbow G/E 3 vws    Narrative    EXAM: XR ELBOW LEFT G/E 3 VIEWS  LOCATION: [facility]  DATE/TIME: 03/12/2023, 2:41 PM    INDICATION: Pain with mobility.  COMPARISON: None.      Impression    IMPRESSION: Olecranon spurring. No evidence for fracture or dislocation.             Discharge Medications   Current Discharge Medication List      START taking these medications    Details   sulfamethoxazole-trimethoprim (BACTRIM DS) 800-160 MG tablet Take 1 tablet by mouth 2 times daily for 7 days  Qty: 14 tablet, Refills: 0    Associated Diagnoses: Acute cystitis without hematuria         CONTINUE these medications which have NOT CHANGED    Details   acetaminophen (TYLENOL) 500 MG tablet Take 500 mg by mouth 4 times daily as needed for pain      albuterol (PROAIR HFA) 108 (90 BASE) MCG/ACT inhaler Inhale 2 puffs into the lungs every 4 hours as needed for shortness of breath or wheezing      carboxymethylcellulose PF (REFRESH PLUS) 0.5 % ophthalmic solution Place 1 drop into both eyes every 4 hours as needed for dry eyes      insulin glargine (LANTUS PEN) 100 UNIT/ML pen Inject 7 Units Subcutaneous At Bedtime  Qty: 15 mL, Refills: 0    Comments: If Lantus is not covered by insurance, may substitute Basaglar or Semglee or  other insulin glargine product per insurance preference at same dose and frequency.    Associated Diagnoses: Type 2 diabetes, HbA1c goal < 7% (H)      loperamide (IMODIUM) 2 MG capsule Take 2 mg by mouth 4 times daily as needed for diarrhea      LORazepam (ATIVAN) 0.5 MG tablet Take 0.5 mg by mouth every 4 hours as needed for anxiety or agitation      miconazole (MICATIN) 2 % external powder Apply topically 2 times daily For redness or irritation      mometasone (NASONEX) 50 MCG/ACT nasal spray Spray 1 spray into both nostrils daily      morphine (MS CONTIN) 15 MG CR tablet Take 15 mg by mouth 2 times daily      morphine 2.5 MG solu-tab Take 2.5 mg by mouth every 8 hours as needed for shortness of breath or pain      omeprazole (PRILOSEC) 10 MG DR capsule Take 10 mg by mouth daily      bacitracin 500 UNIT/GM external ointment Apply topically 2 times daily Apply to wound bed twice daily on right great toe. Place gauze to cover and separate toes from rubbing. Wrap dressing with Kerlix to hold in place.      camphor-menthol (DERMASARRA) 0.5-0.5 % external lotion Apply topically 2 times daily      DEEP SEA NASAL SPRAY 0.65 % nasal spray Spray 2 sprays in nostril every 2 hours as needed      diclofenac (VOLTAREN) 1 % topical gel Apply 4 g topically 2 times daily Apply to right ankle.      insulin pen needle (BD AUTOSHIELD DUO) 30G X 5 MM USE TO INJECT INSULIN ONCE DAILY      miconazole with skin protectant (NALDO ANTIFUNGAL) 2 % CREA cream Apply to sore on buttocks as needed.      nystatin (MYCOSTATIN) 687198 UNIT/GM external powder Apply twice daily to groin folds.           Allergies   Allergies   Allergen Reactions     Amoxicillin Anaphylaxis and Hives     Penicillins Anaphylaxis and Hives     Pregabalin Other (See Comments)     Other reaction(s): Sedation       Ceclor  [Cefaclor]      Clarithromycin      Codeine      Other reaction(s): Chest Pain     Doxycycline Hives     Floxin [Ofloxacin]      Has tolerated cipro in  the past     Glyburide      Patient doesn't know what allergy or reaction she's had. Might potentially be due to sulfa allergy.     Metformin Other (See Comments)     Other reaction(s): lactic acidosis     Morphine Nausea and Vomiting     Strawberry Hives     Sulfa Drugs Unknown     Shakiness/sweating     Tequin      Other reaction(s): Tremors     Cephalexin Rash     Tolerated ceftriaxone 7/24/22     Flonase [Fluticasone]      Bloody nose

## 2023-03-13 NOTE — PLAN OF CARE
S-(situation): Patient discharged to  via w/c with transportation company    B-(background): Observation goals met, started on ABX.    A-(assessment): Patient is alert and oriented to self only. Ambulates Assist of 1, walker, belt. One time evening dose of ABX was sent with patient as assisted living is unsure if they will be able to fill ABX tonight.     R-(recommendations): Discharge instructions reviewed with patient and faxed to facility. Listed belongings gathered and returned to patient.  Patient Education resolved: Yes  New medications-Pt. Has been educated about reason of use and side effects Yes  Home medications returned to patient NA  Medication Bin checked and emptied on discharge Yes

## 2023-03-13 NOTE — PROGRESS NOTES
Care Management Discharge Note    Discharge Date: 03/13/2023    Discharge Disposition: Assisted Living - [facility]    Discharge Services:  [facility]    Discharge DME:      Discharge Transportation: agency, family or friend will provide -  [employer] [facility]    Private pay costs discussed: Not applicable    PAS Confirmation Code:  N/A    Patient/family educated on Medicare website which has current facility and service quality ratings:      Education Provided on the Discharge Plan:    Persons Notified of Discharge Plans: Patient's Guardian, Anand Michaels     Patient/Family in Agreement with the Plan: no    Handoff Referral Completed: Yes    Additional Information:  Patient medically ready to discharge back to:    [facility]   [address]  [place] # [phone]  Fax: [phone]    Left a voicemail for JOSELITO Norwood, at [facility] [phone].    Patient will resume [facility]   Phone [phone]/ Fax: [phone]  Faxed new medication script to [facility].    Spoke with patient's Guardian, Anand Michaels [phone] option 2.  Anand is in agreement with patient returning to [facility] and resume [facility] services.  OBS SHEPPARD Medicare letter emailed to [email or web address].    SW arranged transport with  [employer] [facility] @ [phone].  Patient has insurance coverage for transport.    FELICITAS [facility]Miss[facility] [phone]/ [facility] [phone]  Care Management

## 2023-03-20 NOTE — PROGRESS NOTES
Called Enma at Assisted Living - Per Dr Vega patient should have repeat UA/UC if having urinary symptoms.  Enma reported patient is not having any urinary symptoms that she knows of but will check into

## 2024-04-23 NOTE — TELEPHONE ENCOUNTER
Consult received via workque from provider Sherrie Briceno MD for ulcer of the leg/ankle or foot.    Per Standing order Patient qualifies for venous ultrasound and ABDELRAHMAN to be scheduled prior to assessment with Dr. Shine, Dr. Mcpherson or AARTI Herbert at [facility] [place] [facility].  Order pended.    Routing to  Angela Choi Or Татьяна [place].    YAHIR KiddN, RN-BC   Instructions: This plan will send the code FBSE to the PM system.  DO NOT or CHANGE the price. Detail Level: Simple Price (Do Not Change): 0.00

## 2024-06-17 PROBLEM — Z76.89 HEALTH CARE HOME: Status: RESOLVED | Noted: 2021-09-10 | Resolved: 2024-06-17
